# Patient Record
Sex: MALE | Race: WHITE | NOT HISPANIC OR LATINO | URBAN - METROPOLITAN AREA
[De-identification: names, ages, dates, MRNs, and addresses within clinical notes are randomized per-mention and may not be internally consistent; named-entity substitution may affect disease eponyms.]

---

## 2017-06-05 ENCOUNTER — INPATIENT (INPATIENT)
Facility: HOSPITAL | Age: 82
LOS: 3 days | Discharge: OTHER INSTITUTION | DRG: 470 | End: 2017-06-09
Attending: ORTHOPAEDIC SURGERY | Admitting: ORTHOPAEDIC SURGERY
Payer: MEDICARE

## 2017-06-05 VITALS
RESPIRATION RATE: 18 BRPM | OXYGEN SATURATION: 94 % | SYSTOLIC BLOOD PRESSURE: 163 MMHG | HEART RATE: 111 BPM | DIASTOLIC BLOOD PRESSURE: 88 MMHG | TEMPERATURE: 99 F

## 2017-06-05 PROCEDURE — 93010 ELECTROCARDIOGRAM REPORT: CPT

## 2017-06-05 PROCEDURE — 99285 EMERGENCY DEPT VISIT HI MDM: CPT | Mod: 25,GC

## 2017-06-05 NOTE — ED ADULT NURSE NOTE - PMH
BPH (Benign Prostatic Hyperplasia)    Cholelithiasis    Darier's disease    Diverticulitis    HTN (hypertension)    Hyperlipidemia    IBS (irritable bowel syndrome)    MVP (mitral valve prolapse)    PVC's (premature ventricular contractions)    Restless leg syndrome    Seasonal allergies    Type 2 diabetes mellitus

## 2017-06-05 NOTE — ED ADULT NURSE NOTE - PSH
Anal fistula  s/p surgery  Hx of left hemicolectomy    S/P hernia repair  bilateral  S/P tonsillectomy    S/P TURP  x 2

## 2017-06-05 NOTE — ED ADULT NURSE NOTE - OBJECTIVE STATEMENT
81 yr old male coming in with Right hip pain s/p fall today. Patient states he was removing his pants when he tripped and fell backwards; patient has not been able to ambulate since. No internal or external rotation noted however patient has his right foot externally rotated for comfort. +pedal pulses bilaterally. Pt able to wiggle toes bilaterally. +sensation bilaterally. Denies hitting head. No chest pain, sob, n/v/d, fevers, chills, dizziness. No abrasions or lacerations noted. IV 18G right AC. No family at bedside..

## 2017-06-06 ENCOUNTER — RESULT REVIEW (OUTPATIENT)
Age: 82
End: 2017-06-06

## 2017-06-06 DIAGNOSIS — S72.90XA UNSPECIFIED FRACTURE OF UNSPECIFIED FEMUR, INITIAL ENCOUNTER FOR CLOSED FRACTURE: ICD-10-CM

## 2017-06-06 LAB
ALBUMIN SERPL ELPH-MCNC: 4.6 G/DL — SIGNIFICANT CHANGE UP (ref 3.3–5)
ALP SERPL-CCNC: 85 U/L — SIGNIFICANT CHANGE UP (ref 40–120)
ALT FLD-CCNC: 25 U/L RC — SIGNIFICANT CHANGE UP (ref 10–45)
ANION GAP SERPL CALC-SCNC: 13 MMOL/L — SIGNIFICANT CHANGE UP (ref 5–17)
ANION GAP SERPL CALC-SCNC: 15 MMOL/L — SIGNIFICANT CHANGE UP (ref 5–17)
APPEARANCE UR: CLEAR — SIGNIFICANT CHANGE UP
APTT BLD: 34.4 SEC — SIGNIFICANT CHANGE UP (ref 27.5–37.4)
AST SERPL-CCNC: 30 U/L — SIGNIFICANT CHANGE UP (ref 10–40)
BASOPHILS # BLD AUTO: 0 K/UL — SIGNIFICANT CHANGE UP (ref 0–0.2)
BASOPHILS NFR BLD AUTO: 0.1 % — SIGNIFICANT CHANGE UP (ref 0–2)
BILIRUB SERPL-MCNC: 0.6 MG/DL — SIGNIFICANT CHANGE UP (ref 0.2–1.2)
BILIRUB UR-MCNC: NEGATIVE — SIGNIFICANT CHANGE UP
BLD GP AB SCN SERPL QL: NEGATIVE — SIGNIFICANT CHANGE UP
BUN SERPL-MCNC: 36 MG/DL — HIGH (ref 7–23)
BUN SERPL-MCNC: 43 MG/DL — HIGH (ref 7–23)
CALCIUM SERPL-MCNC: 11.4 MG/DL — HIGH (ref 8.4–10.5)
CALCIUM SERPL-MCNC: 9.8 MG/DL — SIGNIFICANT CHANGE UP (ref 8.4–10.5)
CHLORIDE SERPL-SCNC: 104 MMOL/L — SIGNIFICANT CHANGE UP (ref 96–108)
CHLORIDE SERPL-SCNC: 104 MMOL/L — SIGNIFICANT CHANGE UP (ref 96–108)
CO2 SERPL-SCNC: 21 MMOL/L — LOW (ref 22–31)
CO2 SERPL-SCNC: 23 MMOL/L — SIGNIFICANT CHANGE UP (ref 22–31)
COLOR SPEC: YELLOW — SIGNIFICANT CHANGE UP
COMMENT - URINE: SIGNIFICANT CHANGE UP
CREAT SERPL-MCNC: 1.79 MG/DL — HIGH (ref 0.5–1.3)
CREAT SERPL-MCNC: 1.91 MG/DL — HIGH (ref 0.5–1.3)
DIFF PNL FLD: ABNORMAL
EOSINOPHIL # BLD AUTO: 0.2 K/UL — SIGNIFICANT CHANGE UP (ref 0–0.5)
EOSINOPHIL NFR BLD AUTO: 1 % — SIGNIFICANT CHANGE UP (ref 0–6)
GLUCOSE SERPL-MCNC: 132 MG/DL — HIGH (ref 70–99)
GLUCOSE SERPL-MCNC: 98 MG/DL — SIGNIFICANT CHANGE UP (ref 70–99)
GLUCOSE UR QL: NEGATIVE — SIGNIFICANT CHANGE UP
HCT VFR BLD CALC: 51.4 % — HIGH (ref 39–50)
HCT VFR BLD CALC: 56.9 % — HIGH (ref 39–50)
HCT VFR BLD CALC: 57.2 % — CRITICAL HIGH (ref 39–50)
HGB BLD-MCNC: 16.6 G/DL — SIGNIFICANT CHANGE UP (ref 13–17)
HGB BLD-MCNC: 17.6 G/DL — HIGH (ref 13–17)
HGB BLD-MCNC: 18.1 G/DL — HIGH (ref 13–17)
HYALINE CASTS # UR AUTO: ABNORMAL
INR BLD: 1.05 RATIO — SIGNIFICANT CHANGE UP (ref 0.88–1.16)
KETONES UR-MCNC: NEGATIVE — SIGNIFICANT CHANGE UP
LEUKOCYTE ESTERASE UR-ACNC: NEGATIVE — SIGNIFICANT CHANGE UP
LYMPHOCYTES # BLD AUTO: 1 K/UL — SIGNIFICANT CHANGE UP (ref 1–3.3)
LYMPHOCYTES # BLD AUTO: 4.8 % — LOW (ref 13–44)
MCHC RBC-ENTMCNC: 29.6 PG — SIGNIFICANT CHANGE UP (ref 27–34)
MCHC RBC-ENTMCNC: 30.1 PG — SIGNIFICANT CHANGE UP (ref 27–34)
MCHC RBC-ENTMCNC: 30.7 GM/DL — LOW (ref 32–36)
MCHC RBC-ENTMCNC: 30.8 PG — SIGNIFICANT CHANGE UP (ref 27–34)
MCHC RBC-ENTMCNC: 31.9 GM/DL — LOW (ref 32–36)
MCHC RBC-ENTMCNC: 32.3 GM/DL — SIGNIFICANT CHANGE UP (ref 32–36)
MCV RBC AUTO: 94.3 FL — SIGNIFICANT CHANGE UP (ref 80–100)
MCV RBC AUTO: 95.3 FL — SIGNIFICANT CHANGE UP (ref 80–100)
MCV RBC AUTO: 96.3 FL — SIGNIFICANT CHANGE UP (ref 80–100)
MONOCYTES # BLD AUTO: 1.2 K/UL — HIGH (ref 0–0.9)
MONOCYTES NFR BLD AUTO: 5.6 % — SIGNIFICANT CHANGE UP (ref 2–14)
NEUTROPHILS # BLD AUTO: 18.8 K/UL — HIGH (ref 1.8–7.4)
NEUTROPHILS NFR BLD AUTO: 88.4 % — HIGH (ref 43–77)
NITRITE UR-MCNC: NEGATIVE — SIGNIFICANT CHANGE UP
PH UR: 6 — SIGNIFICANT CHANGE UP (ref 5–8)
PLATELET # BLD AUTO: 299 K/UL — SIGNIFICANT CHANGE UP (ref 150–400)
PLATELET # BLD AUTO: 327 K/UL — SIGNIFICANT CHANGE UP (ref 150–400)
PLATELET # BLD AUTO: 361 K/UL — SIGNIFICANT CHANGE UP (ref 150–400)
POTASSIUM SERPL-MCNC: 4.7 MMOL/L — SIGNIFICANT CHANGE UP (ref 3.5–5.3)
POTASSIUM SERPL-MCNC: 5.1 MMOL/L — SIGNIFICANT CHANGE UP (ref 3.5–5.3)
POTASSIUM SERPL-SCNC: 4.7 MMOL/L — SIGNIFICANT CHANGE UP (ref 3.5–5.3)
POTASSIUM SERPL-SCNC: 5.1 MMOL/L — SIGNIFICANT CHANGE UP (ref 3.5–5.3)
PROT SERPL-MCNC: 7 G/DL — SIGNIFICANT CHANGE UP (ref 6–8.3)
PROT UR-MCNC: 30 MG/DL
PROTHROM AB SERPL-ACNC: 11.4 SEC — SIGNIFICANT CHANGE UP (ref 9.8–12.7)
RBC # BLD: 5.39 M/UL — SIGNIFICANT CHANGE UP (ref 4.2–5.8)
RBC # BLD: 5.94 M/UL — HIGH (ref 4.2–5.8)
RBC # BLD: 6.03 M/UL — HIGH (ref 4.2–5.8)
RBC # FLD: 13.9 % — SIGNIFICANT CHANGE UP (ref 10.3–14.5)
RBC # FLD: 14 % — SIGNIFICANT CHANGE UP (ref 10.3–14.5)
RBC # FLD: 14 % — SIGNIFICANT CHANGE UP (ref 10.3–14.5)
RBC CASTS # UR COMP ASSIST: ABNORMAL /HPF (ref 0–2)
RH IG SCN BLD-IMP: NEGATIVE — SIGNIFICANT CHANGE UP
SODIUM SERPL-SCNC: 140 MMOL/L — SIGNIFICANT CHANGE UP (ref 135–145)
SODIUM SERPL-SCNC: 140 MMOL/L — SIGNIFICANT CHANGE UP (ref 135–145)
SP GR SPEC: 1.02 — SIGNIFICANT CHANGE UP (ref 1.01–1.02)
UROBILINOGEN FLD QL: NEGATIVE — SIGNIFICANT CHANGE UP
WBC # BLD: 15.8 K/UL — HIGH (ref 3.8–10.5)
WBC # BLD: 18.9 K/UL — HIGH (ref 3.8–10.5)
WBC # BLD: 21.2 K/UL — HIGH (ref 3.8–10.5)
WBC # FLD AUTO: 15.8 K/UL — HIGH (ref 3.8–10.5)
WBC # FLD AUTO: 18.9 K/UL — HIGH (ref 3.8–10.5)
WBC # FLD AUTO: 21.2 K/UL — HIGH (ref 3.8–10.5)
WBC UR QL: SIGNIFICANT CHANGE UP /HPF (ref 0–5)

## 2017-06-06 PROCEDURE — 73502 X-RAY EXAM HIP UNI 2-3 VIEWS: CPT | Mod: 26,RT

## 2017-06-06 PROCEDURE — 73552 X-RAY EXAM OF FEMUR 2/>: CPT | Mod: 26,RT

## 2017-06-06 PROCEDURE — 72170 X-RAY EXAM OF PELVIS: CPT | Mod: 26,77

## 2017-06-06 PROCEDURE — 88311 DECALCIFY TISSUE: CPT | Mod: 26

## 2017-06-06 PROCEDURE — 27130 TOTAL HIP ARTHROPLASTY: CPT | Mod: RT

## 2017-06-06 PROCEDURE — 71010: CPT | Mod: 26

## 2017-06-06 PROCEDURE — 88305 TISSUE EXAM BY PATHOLOGIST: CPT | Mod: 26

## 2017-06-06 RX ORDER — GLUCAGON INJECTION, SOLUTION 0.5 MG/.1ML
1 INJECTION, SOLUTION SUBCUTANEOUS ONCE
Qty: 0 | Refills: 0 | Status: DISCONTINUED | OUTPATIENT
Start: 2017-06-06 | End: 2017-06-09

## 2017-06-06 RX ORDER — ONDANSETRON 8 MG/1
4 TABLET, FILM COATED ORAL ONCE
Qty: 0 | Refills: 0 | Status: DISCONTINUED | OUTPATIENT
Start: 2017-06-06 | End: 2017-06-06

## 2017-06-06 RX ORDER — MAGNESIUM HYDROXIDE 400 MG/1
30 TABLET, CHEWABLE ORAL DAILY
Qty: 0 | Refills: 0 | Status: DISCONTINUED | OUTPATIENT
Start: 2017-06-06 | End: 2017-06-09

## 2017-06-06 RX ORDER — INSULIN LISPRO 100/ML
VIAL (ML) SUBCUTANEOUS AT BEDTIME
Qty: 0 | Refills: 0 | Status: DISCONTINUED | OUTPATIENT
Start: 2017-06-06 | End: 2017-06-06

## 2017-06-06 RX ORDER — SODIUM CHLORIDE 9 MG/ML
500 INJECTION, SOLUTION INTRAVENOUS ONCE
Qty: 0 | Refills: 0 | Status: COMPLETED | OUTPATIENT
Start: 2017-06-06 | End: 2017-06-06

## 2017-06-06 RX ORDER — ACETAMINOPHEN 500 MG
650 TABLET ORAL EVERY 6 HOURS
Qty: 0 | Refills: 0 | Status: DISCONTINUED | OUTPATIENT
Start: 2017-06-06 | End: 2017-06-09

## 2017-06-06 RX ORDER — DEXTROSE 50 % IN WATER 50 %
25 SYRINGE (ML) INTRAVENOUS ONCE
Qty: 0 | Refills: 0 | Status: DISCONTINUED | OUTPATIENT
Start: 2017-06-06 | End: 2017-06-06

## 2017-06-06 RX ORDER — FINASTERIDE 5 MG/1
5 TABLET, FILM COATED ORAL DAILY
Qty: 0 | Refills: 0 | Status: DISCONTINUED | OUTPATIENT
Start: 2017-06-06 | End: 2017-06-06

## 2017-06-06 RX ORDER — ACETAMINOPHEN 500 MG
1000 TABLET ORAL ONCE
Qty: 0 | Refills: 0 | Status: COMPLETED | OUTPATIENT
Start: 2017-06-06 | End: 2017-06-06

## 2017-06-06 RX ORDER — CEFAZOLIN SODIUM 1 G
2000 VIAL (EA) INJECTION EVERY 8 HOURS
Qty: 0 | Refills: 0 | Status: COMPLETED | OUTPATIENT
Start: 2017-06-06 | End: 2017-06-07

## 2017-06-06 RX ORDER — TAMSULOSIN HYDROCHLORIDE 0.4 MG/1
0.4 CAPSULE ORAL AT BEDTIME
Qty: 0 | Refills: 0 | Status: DISCONTINUED | OUTPATIENT
Start: 2017-06-06 | End: 2017-06-06

## 2017-06-06 RX ORDER — OXYCODONE HYDROCHLORIDE 5 MG/1
5 TABLET ORAL EVERY 4 HOURS
Qty: 0 | Refills: 0 | Status: DISCONTINUED | OUTPATIENT
Start: 2017-06-06 | End: 2017-06-09

## 2017-06-06 RX ORDER — CHOLECALCIFEROL (VITAMIN D3) 125 MCG
1000 CAPSULE ORAL ONCE
Qty: 0 | Refills: 0 | Status: COMPLETED | OUTPATIENT
Start: 2017-06-06 | End: 2017-06-06

## 2017-06-06 RX ORDER — INSULIN LISPRO 100/ML
VIAL (ML) SUBCUTANEOUS
Qty: 0 | Refills: 0 | Status: DISCONTINUED | OUTPATIENT
Start: 2017-06-06 | End: 2017-06-09

## 2017-06-06 RX ORDER — OXYCODONE HYDROCHLORIDE 5 MG/1
10 TABLET ORAL EVERY 4 HOURS
Qty: 0 | Refills: 0 | Status: DISCONTINUED | OUTPATIENT
Start: 2017-06-06 | End: 2017-06-09

## 2017-06-06 RX ORDER — HYDROMORPHONE HYDROCHLORIDE 2 MG/ML
0.5 INJECTION INTRAMUSCULAR; INTRAVENOUS; SUBCUTANEOUS
Qty: 0 | Refills: 0 | Status: DISCONTINUED | OUTPATIENT
Start: 2017-06-06 | End: 2017-06-06

## 2017-06-06 RX ORDER — SODIUM CHLORIDE 9 MG/ML
1000 INJECTION, SOLUTION INTRAVENOUS
Qty: 0 | Refills: 0 | Status: DISCONTINUED | OUTPATIENT
Start: 2017-06-06 | End: 2017-06-06

## 2017-06-06 RX ORDER — SODIUM CHLORIDE 9 MG/ML
1000 INJECTION, SOLUTION INTRAVENOUS
Qty: 0 | Refills: 0 | Status: DISCONTINUED | OUTPATIENT
Start: 2017-06-06 | End: 2017-06-09

## 2017-06-06 RX ORDER — ONDANSETRON 8 MG/1
4 TABLET, FILM COATED ORAL EVERY 4 HOURS
Qty: 0 | Refills: 0 | Status: DISCONTINUED | OUTPATIENT
Start: 2017-06-06 | End: 2017-06-09

## 2017-06-06 RX ORDER — DEXTROSE 50 % IN WATER 50 %
12.5 SYRINGE (ML) INTRAVENOUS ONCE
Qty: 0 | Refills: 0 | Status: DISCONTINUED | OUTPATIENT
Start: 2017-06-06 | End: 2017-06-09

## 2017-06-06 RX ORDER — ONDANSETRON 8 MG/1
4 TABLET, FILM COATED ORAL EVERY 6 HOURS
Qty: 0 | Refills: 0 | Status: DISCONTINUED | OUTPATIENT
Start: 2017-06-06 | End: 2017-06-09

## 2017-06-06 RX ORDER — ENOXAPARIN SODIUM 100 MG/ML
40 INJECTION SUBCUTANEOUS DAILY
Qty: 0 | Refills: 0 | Status: DISCONTINUED | OUTPATIENT
Start: 2017-06-07 | End: 2017-06-09

## 2017-06-06 RX ORDER — INSULIN LISPRO 100/ML
VIAL (ML) SUBCUTANEOUS AT BEDTIME
Qty: 0 | Refills: 0 | Status: DISCONTINUED | OUTPATIENT
Start: 2017-06-06 | End: 2017-06-09

## 2017-06-06 RX ORDER — POLYETHYLENE GLYCOL 3350 17 G/17G
17 POWDER, FOR SOLUTION ORAL DAILY
Qty: 0 | Refills: 0 | Status: DISCONTINUED | OUTPATIENT
Start: 2017-06-06 | End: 2017-06-09

## 2017-06-06 RX ORDER — FINASTERIDE 5 MG/1
5 TABLET, FILM COATED ORAL DAILY
Qty: 0 | Refills: 0 | Status: DISCONTINUED | OUTPATIENT
Start: 2017-06-06 | End: 2017-06-09

## 2017-06-06 RX ORDER — ACETAMINOPHEN 500 MG
1000 TABLET ORAL ONCE
Qty: 0 | Refills: 0 | Status: DISCONTINUED | OUTPATIENT
Start: 2017-06-06 | End: 2017-06-09

## 2017-06-06 RX ORDER — HYDROMORPHONE HYDROCHLORIDE 2 MG/ML
0.5 INJECTION INTRAMUSCULAR; INTRAVENOUS; SUBCUTANEOUS EVERY 4 HOURS
Qty: 0 | Refills: 0 | Status: DISCONTINUED | OUTPATIENT
Start: 2017-06-06 | End: 2017-06-09

## 2017-06-06 RX ORDER — MORPHINE SULFATE 50 MG/1
4 CAPSULE, EXTENDED RELEASE ORAL EVERY 4 HOURS
Qty: 0 | Refills: 0 | Status: DISCONTINUED | OUTPATIENT
Start: 2017-06-06 | End: 2017-06-06

## 2017-06-06 RX ORDER — MONTELUKAST 4 MG/1
10 TABLET, CHEWABLE ORAL ONCE
Qty: 0 | Refills: 0 | Status: COMPLETED | OUTPATIENT
Start: 2017-06-06 | End: 2017-06-06

## 2017-06-06 RX ORDER — DEXTROSE 50 % IN WATER 50 %
1 SYRINGE (ML) INTRAVENOUS ONCE
Qty: 0 | Refills: 0 | Status: DISCONTINUED | OUTPATIENT
Start: 2017-06-06 | End: 2017-06-09

## 2017-06-06 RX ORDER — ALLOPURINOL 300 MG
300 TABLET ORAL ONCE
Qty: 0 | Refills: 0 | Status: COMPLETED | OUTPATIENT
Start: 2017-06-06 | End: 2017-06-06

## 2017-06-06 RX ORDER — SODIUM CHLORIDE 9 MG/ML
1000 INJECTION, SOLUTION INTRAVENOUS
Qty: 0 | Refills: 0 | Status: DISCONTINUED | OUTPATIENT
Start: 2017-06-06 | End: 2017-06-07

## 2017-06-06 RX ORDER — GLUCAGON INJECTION, SOLUTION 0.5 MG/.1ML
1 INJECTION, SOLUTION SUBCUTANEOUS ONCE
Qty: 0 | Refills: 0 | Status: DISCONTINUED | OUTPATIENT
Start: 2017-06-06 | End: 2017-06-06

## 2017-06-06 RX ORDER — INSULIN LISPRO 100/ML
VIAL (ML) SUBCUTANEOUS
Qty: 0 | Refills: 0 | Status: DISCONTINUED | OUTPATIENT
Start: 2017-06-06 | End: 2017-06-06

## 2017-06-06 RX ORDER — OXYCODONE HYDROCHLORIDE 5 MG/1
5 TABLET ORAL EVERY 4 HOURS
Qty: 0 | Refills: 0 | Status: DISCONTINUED | OUTPATIENT
Start: 2017-06-06 | End: 2017-06-06

## 2017-06-06 RX ORDER — TAMSULOSIN HYDROCHLORIDE 0.4 MG/1
0.4 CAPSULE ORAL AT BEDTIME
Qty: 0 | Refills: 0 | Status: DISCONTINUED | OUTPATIENT
Start: 2017-06-06 | End: 2017-06-09

## 2017-06-06 RX ORDER — DEXTROSE 50 % IN WATER 50 %
12.5 SYRINGE (ML) INTRAVENOUS ONCE
Qty: 0 | Refills: 0 | Status: DISCONTINUED | OUTPATIENT
Start: 2017-06-06 | End: 2017-06-06

## 2017-06-06 RX ORDER — DEXTROSE 50 % IN WATER 50 %
25 SYRINGE (ML) INTRAVENOUS ONCE
Qty: 0 | Refills: 0 | Status: DISCONTINUED | OUTPATIENT
Start: 2017-06-06 | End: 2017-06-09

## 2017-06-06 RX ORDER — DOCUSATE SODIUM 100 MG
100 CAPSULE ORAL THREE TIMES A DAY
Qty: 0 | Refills: 0 | Status: DISCONTINUED | OUTPATIENT
Start: 2017-06-06 | End: 2017-06-09

## 2017-06-06 RX ORDER — OXYCODONE HYDROCHLORIDE 5 MG/1
10 TABLET ORAL EVERY 4 HOURS
Qty: 0 | Refills: 0 | Status: DISCONTINUED | OUTPATIENT
Start: 2017-06-06 | End: 2017-06-06

## 2017-06-06 RX ORDER — SENNA PLUS 8.6 MG/1
2 TABLET ORAL AT BEDTIME
Qty: 0 | Refills: 0 | Status: DISCONTINUED | OUTPATIENT
Start: 2017-06-06 | End: 2017-06-09

## 2017-06-06 RX ORDER — DEXTROSE 50 % IN WATER 50 %
1 SYRINGE (ML) INTRAVENOUS ONCE
Qty: 0 | Refills: 0 | Status: DISCONTINUED | OUTPATIENT
Start: 2017-06-06 | End: 2017-06-06

## 2017-06-06 RX ORDER — MORPHINE SULFATE 50 MG/1
4 CAPSULE, EXTENDED RELEASE ORAL ONCE
Qty: 0 | Refills: 0 | Status: DISCONTINUED | OUTPATIENT
Start: 2017-06-06 | End: 2017-06-06

## 2017-06-06 RX ORDER — ONDANSETRON 8 MG/1
4 TABLET, FILM COATED ORAL EVERY 4 HOURS
Qty: 0 | Refills: 0 | Status: DISCONTINUED | OUTPATIENT
Start: 2017-06-06 | End: 2017-06-06

## 2017-06-06 RX ADMIN — Medication 300 MILLIGRAM(S): at 05:40

## 2017-06-06 RX ADMIN — FINASTERIDE 5 MILLIGRAM(S): 5 TABLET, FILM COATED ORAL at 05:40

## 2017-06-06 RX ADMIN — Medication 400 MILLIGRAM(S): at 01:41

## 2017-06-06 RX ADMIN — FINASTERIDE 5 MILLIGRAM(S): 5 TABLET, FILM COATED ORAL at 19:13

## 2017-06-06 RX ADMIN — Medication 1000 UNIT(S): at 05:40

## 2017-06-06 RX ADMIN — SODIUM CHLORIDE 50 MILLILITER(S): 9 INJECTION, SOLUTION INTRAVENOUS at 04:59

## 2017-06-06 RX ADMIN — SODIUM CHLORIDE 1000 MILLILITER(S): 9 INJECTION, SOLUTION INTRAVENOUS at 19:00

## 2017-06-06 RX ADMIN — TAMSULOSIN HYDROCHLORIDE 0.4 MILLIGRAM(S): 0.4 CAPSULE ORAL at 19:13

## 2017-06-06 RX ADMIN — MONTELUKAST 10 MILLIGRAM(S): 4 TABLET, CHEWABLE ORAL at 05:39

## 2017-06-06 RX ADMIN — Medication 400 MILLIGRAM(S): at 19:00

## 2017-06-06 RX ADMIN — Medication 1000 MILLIGRAM(S): at 19:54

## 2017-06-06 RX ADMIN — MORPHINE SULFATE 4 MILLIGRAM(S): 50 CAPSULE, EXTENDED RELEASE ORAL at 07:14

## 2017-06-06 RX ADMIN — Medication 100 MILLIGRAM(S): at 23:17

## 2017-06-06 RX ADMIN — SODIUM CHLORIDE 50 MILLILITER(S): 9 INJECTION, SOLUTION INTRAVENOUS at 06:59

## 2017-06-06 RX ADMIN — SODIUM CHLORIDE 100 MILLILITER(S): 9 INJECTION, SOLUTION INTRAVENOUS at 18:34

## 2017-06-06 RX ADMIN — MORPHINE SULFATE 4 MILLIGRAM(S): 50 CAPSULE, EXTENDED RELEASE ORAL at 05:43

## 2017-06-06 RX ADMIN — TAMSULOSIN HYDROCHLORIDE 0.4 MILLIGRAM(S): 0.4 CAPSULE ORAL at 05:40

## 2017-06-06 RX ADMIN — MORPHINE SULFATE 4 MILLIGRAM(S): 50 CAPSULE, EXTENDED RELEASE ORAL at 04:16

## 2017-06-06 RX ADMIN — Medication 1000 MILLIGRAM(S): at 04:02

## 2017-06-06 RX ADMIN — MORPHINE SULFATE 4 MILLIGRAM(S): 50 CAPSULE, EXTENDED RELEASE ORAL at 06:59

## 2017-06-06 NOTE — ED PROVIDER NOTE - ATTENDING CONTRIBUTION TO CARE
81 year old M presenting with R hip pain s/p fall onto buttock this evening. Pt was removing jeans and fell back. no head injury, no loss of consciousness. only c/o R hip pain. not able to walk.   pain is severe, worse with movement. took nothing for pain  plan to eval for hip fxr, analgesia. admission for fxr vs likely admission if patient not walking.

## 2017-06-06 NOTE — ED PROVIDER NOTE - PHYSICAL EXAMINATION
Well Appearing, Nontoxic, NAD;  Symm Facies, PERRL 3mm, (-)Pallor, Anicteric, MMM;  No JVD/Bruits or stridor;  RRR w/o m/g/r;   CTAB w/o w/r/r;   Abd soft, nt/nd, +bs;  No CVAT;  No edema/calf tender;  No rash;  AOX3, Normal speech, normal strength/sensation  R hip pain with range of motion, good dp/pt pulses. neurovascularly intact.

## 2017-06-06 NOTE — PATIENT PROFILE ADULT. - NS TRANSFER PATIENT BELONGINGS
wedding band, necklace, pants, shirt, wallet, shoes, keys, cellphone/Cell Phone/PDA (specify)/Clothing/Jewelry/Money (specify)/Wrist Watch/Electronic Device (specify)

## 2017-06-06 NOTE — H&P ADULT. - HISTORY OF PRESENT ILLNESS
80yo M presents with right hip pain s/p fall. Pt is a community ambulatory at baseline without assistive devices. Pt denies numbness tingling paresthesias in affected limb. Pt denies headstrike or LOC and denies any other orthopedic injuries at this time    PMHx: BPH, HTN, HLD, IBS, MVP, DM, RLS  PSHx: TURP x2  Allergies: sulfa, ambien, compazine  Social: denies tobacco/EtOH  Imaging: XR demonstrate a displaced right femoral neck fracture    PE:  Gen: NAD, AAOx3  RLE: Skin intact, no gross deformity, limb is shortened and ER; no bony TTP at Knee/Foot/Toes, unable to SLR, +logroll/heelstrike, +EHL/FHL/TA/GS, SILT L3-S1, +DP/PT Pulses, compartments soft, no calf TTP B/L    Secondary Survey: Full ROM of unaffected extremities, SILT globally, compartments soft, no bony TTP over bony prominences, no calf TTP, able to SLR with contralateral leg, no TTP along axial spine

## 2017-06-07 LAB
ANION GAP SERPL CALC-SCNC: 15 MMOL/L — SIGNIFICANT CHANGE UP (ref 5–17)
BUN SERPL-MCNC: 33 MG/DL — HIGH (ref 7–23)
CALCIUM SERPL-MCNC: 9.6 MG/DL — SIGNIFICANT CHANGE UP (ref 8.4–10.5)
CHLORIDE SERPL-SCNC: 102 MMOL/L — SIGNIFICANT CHANGE UP (ref 96–108)
CO2 SERPL-SCNC: 19 MMOL/L — LOW (ref 22–31)
CREAT SERPL-MCNC: 1.68 MG/DL — HIGH (ref 0.5–1.3)
GLUCOSE SERPL-MCNC: 123 MG/DL — HIGH (ref 70–99)
HBA1C BLD-MCNC: 5.6 % — SIGNIFICANT CHANGE UP (ref 4–5.6)
HCT VFR BLD CALC: 48.4 % — SIGNIFICANT CHANGE UP (ref 39–50)
HGB BLD-MCNC: 16.1 G/DL — SIGNIFICANT CHANGE UP (ref 13–17)
MCHC RBC-ENTMCNC: 30.2 PG — SIGNIFICANT CHANGE UP (ref 27–34)
MCHC RBC-ENTMCNC: 33.3 GM/DL — SIGNIFICANT CHANGE UP (ref 32–36)
MCV RBC AUTO: 90.8 FL — SIGNIFICANT CHANGE UP (ref 80–100)
PLATELET # BLD AUTO: 274 K/UL — SIGNIFICANT CHANGE UP (ref 150–400)
POTASSIUM SERPL-MCNC: 5.2 MMOL/L — SIGNIFICANT CHANGE UP (ref 3.5–5.3)
POTASSIUM SERPL-SCNC: 5.2 MMOL/L — SIGNIFICANT CHANGE UP (ref 3.5–5.3)
RBC # BLD: 5.33 M/UL — SIGNIFICANT CHANGE UP (ref 4.2–5.8)
RBC # FLD: 15.3 % — HIGH (ref 10.3–14.5)
SODIUM SERPL-SCNC: 136 MMOL/L — SIGNIFICANT CHANGE UP (ref 135–145)
WBC # BLD: 18.86 K/UL — HIGH (ref 3.8–10.5)
WBC # FLD AUTO: 18.86 K/UL — HIGH (ref 3.8–10.5)

## 2017-06-07 RX ORDER — BENZOCAINE AND MENTHOL 5; 1 G/100ML; G/100ML
1 LIQUID ORAL
Qty: 0 | Refills: 0 | Status: DISCONTINUED | OUTPATIENT
Start: 2017-06-07 | End: 2017-06-09

## 2017-06-07 RX ADMIN — Medication 650 MILLIGRAM(S): at 06:53

## 2017-06-07 RX ADMIN — POLYETHYLENE GLYCOL 3350 17 GRAM(S): 17 POWDER, FOR SOLUTION ORAL at 11:48

## 2017-06-07 RX ADMIN — ENOXAPARIN SODIUM 40 MILLIGRAM(S): 100 INJECTION SUBCUTANEOUS at 13:01

## 2017-06-07 RX ADMIN — TAMSULOSIN HYDROCHLORIDE 0.4 MILLIGRAM(S): 0.4 CAPSULE ORAL at 22:21

## 2017-06-07 RX ADMIN — Medication 650 MILLIGRAM(S): at 13:33

## 2017-06-07 RX ADMIN — FINASTERIDE 5 MILLIGRAM(S): 5 TABLET, FILM COATED ORAL at 11:48

## 2017-06-07 RX ADMIN — OXYCODONE HYDROCHLORIDE 5 MILLIGRAM(S): 5 TABLET ORAL at 15:38

## 2017-06-07 RX ADMIN — OXYCODONE HYDROCHLORIDE 5 MILLIGRAM(S): 5 TABLET ORAL at 15:07

## 2017-06-07 RX ADMIN — Medication 650 MILLIGRAM(S): at 13:03

## 2017-06-07 RX ADMIN — Medication 100 MILLIGRAM(S): at 06:03

## 2017-06-07 RX ADMIN — Medication 100 MILLIGRAM(S): at 06:52

## 2017-06-07 RX ADMIN — Medication 100 MILLIGRAM(S): at 22:21

## 2017-06-07 RX ADMIN — Medication 100 MILLIGRAM(S): at 13:01

## 2017-06-07 NOTE — DISCHARGE NOTE ADULT - MEDICATION SUMMARY - MEDICATIONS TO STOP TAKING
I will STOP taking the medications listed below when I get home from the hospital:    Co-Q10 100 mg oral capsule  -- 1 cap(s) by mouth once a day    acetaminophen-oxyCODONE 325 mg-5 mg oral tablet  -- 1 tab(s) by mouth every 4 hours, As needed, Moderate Pain    acetaminophen-oxyCODONE 325 mg-5 mg oral tablet  -- 2 tab(s) by mouth every 6 hours, As Needed -Severe Pain

## 2017-06-07 NOTE — DISCHARGE NOTE ADULT - CARE PROVIDER_API CALL
Wiliam Sheffield), Orthopedics  [address] [place] [address]  Phone: [phone]  Fax: [phone]

## 2017-06-07 NOTE — OCCUPATIONAL THERAPY INITIAL EVALUATION ADULT - PERTINENT HX OF CURRENT PROBLEM, REHAB EVAL
82yo M presents with right hip pain s/p fall. Pt is a community ambulatory at baseline without assistive devices. Pt denies numbness tingling paresthesias in affected limb.. Pt with Right fem neck fracture. s/p VERONA 6/6/17

## 2017-06-07 NOTE — DISCHARGE NOTE ADULT - ADDITIONAL INSTRUCTIONS
- Follow up with Dr. Sheffield in 3-4 weeks after surgery; call office for appointment upon discharge from rehab  - - Please have staples/sutures removed by physician 10-14 days after surgery if applicable  - - please follow up with your primary care doctor after discharge from hospital to discuss your hospital stay and any changes to you medications

## 2017-06-07 NOTE — PROGRESS NOTE ADULT - SUBJECTIVE AND OBJECTIVE BOX
Patient is a 81y old  Male who presents with a chief complaint of right femoral neck fracture s/p right VERONA on 17    INTERVAL HPI/OVERNIGHT EVENTS:    Patient feels well no fever chills or calix pnd orthopnea. Pain is3/10. Denies pleuritic pain nO abadominal pain nausea or vomiting.    PAST MEDICAL & SURGICAL HISTORY:  Restless leg syndrome  Seasonal allergies  BPH (Benign Prostatic Hyperplasia)  Cholelithiasis  Type 2 diabetes mellitus  Diverticulitis  IBS (irritable bowel syndrome)  Darier&#x27;s disease  MVP (mitral valve prolapse)  PVC&#x27;s (premature ventricular contractions)  Hyperlipidemia  HTN (hypertension)  Hx of left hemicolectomy  Anal fistula: s/p surgery  S/P hernia repair: bilateral  S/P TURP: x 2  S/P tonsillectomy      MEDICATIONS  (STANDING):  tamsulosin 0.4milliGRAM(s) Oral at bedtime  finasteride 5milliGRAM(s) Oral daily  lactated ringers. 1000milliLiter(s) IV Continuous <Continuous>  polyethylene glycol 3350 17Gram(s) Oral daily  docusate sodium 100milliGRAM(s) Oral three times a day  insulin lispro (HumaLOG) corrective regimen sliding scale  SubCutaneous three times a day before meals  insulin lispro (HumaLOG) corrective regimen sliding scale  SubCutaneous at bedtime  dextrose 5%. 1000milliLiter(s) IV Continuous <Continuous>  dextrose 50% Injectable 12.5Gram(s) IV Push once  dextrose 50% Injectable 25Gram(s) IV Push once  dextrose 50% Injectable 25Gram(s) IV Push once  enoxaparin Injectable 40milliGRAM(s) SubCutaneous daily    MEDICATIONS  (PRN):  oxyCODONE IR 10milliGRAM(s) Oral every 4 hours PRN Moderate Pain  oxyCODONE IR 5milliGRAM(s) Oral every 4 hours PRN Mild Pain  ondansetron Injectable 4milliGRAM(s) IV Push every 4 hours PRN Nausea and/or Vomiting  acetaminophen   Tablet 650milliGRAM(s) Oral every 6 hours PRN For Temp greater than 38 C (100.4 F)  acetaminophen   Tablet. 650milliGRAM(s) Oral every 6 hours PRN Mild Pain (1 - 3)  aluminum hydroxide/magnesium hydroxide/simethicone Suspension 30milliLiter(s) Oral four times a day PRN Indigestion  ondansetron Injectable 4milliGRAM(s) IV Push every 6 hours PRN Nausea and/or Vomiting  senna 2Tablet(s) Oral at bedtime PRN Constipation  magnesium hydroxide Suspension 30milliLiter(s) Oral daily PRN Constipation  dextrose Gel 1Dose(s) Oral once PRN Blood Glucose LESS THAN 70 milliGRAM(s)/deciliter  glucagon  Injectable 1milliGRAM(s) IntraMuscular once PRN Glucose LESS THAN 70 milligrams/deciliter  HYDROmorphone  Injectable 0.5milliGRAM(s) IV Push every 4 hours PRN severe breakthrough pain  acetaminophen  IVPB. 1000milliGRAM(s) IV Intermittent once PRN Mild Pain (1 - 3)      REVIEW OF SYSTEMS:  CONSTITUTIONAL: No fever or chills  NECK: No pain or stiffness  RESPIRATORY: No cough, wheezing, chills or hemoptysis; No shortness of breath  CARDIOVASCULAR: No chest pain, palpitations, dizziness, or leg swelling  GASTROINTESTINAL: No abdominal or epigastric pain. No nausea, vomiting, or hematemesis; No diarrhea or constipation. No melena or hematochezi  NEUROLOGICAL: No headaches, memory loss, loss of strength, numbness, or tremors  SKIN: No itching, burning, rashes, or lesions   MUSCULOSKELETAL: pain at surgical site 3/10    Vital Signs Last 24 Hrs  T(C): 36.8, Max: 37.5 (- @ 13:20)  T(F): 98.2, Max: 99.5 ( @ 13:20)  HR: 71 (71 - 97)  BP: 119/68 (96/58 - 144/66)  BP(mean): 95 (71 - 95)  RR: 18 (15 - 18)  SpO2: 98% (93% - 100%)    I&O's Summary  I & Os for 24h ending 2017 07:00  =============================================  IN: 900 ml / OUT: 1655 ml / NET: -755 ml    I & Os for current day (as of 2017 12:36)  =============================================  IN: 420 ml / OUT: 0 ml / NET: 420 ml      PHYSICAL EXAM:  GENERAL: NAD, well-groomed, well-developed  HEAD:  Atraumatic, Normocephalic  EYES: EOMI, PERRLA, conjunctiva and sclera clear  NECK: Supple, No JVD, Normal thyroid  CHEST/LUNG: Clear to percussion bilaterally; No rales, rhonchi, wheezing, or rubs  HEART: Regular rate and rhythm; No murmurs, rubs, or gallops normal S1S2   ABDOMEN: Soft, Nontender, Nondistended; Bowel sounds present, no organomegaly  EXTREMITIES:  2+ Peripheral Pulses, No clubbing, cyanosis, or 1-2+ edema SURGICAL LEG  NERVOUS SYSTEM:  Alert & Oriented X3, Good concentration; Motor Strength 5/5 B/L upper and lower extremities; DTRs 2+ intact and symmetric    Consultant(s) Notes Reviewed:  [x ] YES  [ ] NO  Care Discussed with Consultants/Other Providers [ x] YES  [ ] NO    LABS:                        16.1   18.86 )-----------( 274      ( 2017 09:27 )             48.4     06-07    136  |  102  |  33<H>  ----------------------------<  123<H>  5.2   |  19<L>  |  1.68<H>    Ca    9.6      2017 09:12    TPro  7.0  /  Alb  4.6  /  TBili  0.6  /  DBili  x   /  AST  30  /  ALT  25  /  AlkPhos  85  06-06    PT/INR - ( 2017 01:41 )   PT: 11.4 sec;   INR: 1.05 ratio         PTT - ( 2017 01:41 )  PTT:34.4 sec  Urinalysis Basic - ( 2017 05:04 )    Color: Yellow / Appearance: Clear / S.020 / pH: x  Gluc: x / Ketone: Negative  / Bili: Negative / Urobili: Negative   Blood: x / Protein: 30 mg/dL / Nitrite: Negative   Leuk Esterase: Negative / RBC: 2-5 /HPF / WBC 0-2 /HPF   Sq Epi: x / Non Sq Epi: x / Bacteria: x      CAPILLARY BLOOD GLUCOSE  108 (2017 12:29)  118 (2017 07:41)  113 (2017 23:47)

## 2017-06-07 NOTE — DISCHARGE NOTE ADULT - OTHER SIGNIFICANT FINDINGS
XR Right femur:  IMPRESSION: Acute transversely oriented right femoral neck fracture with   superior displacement of the distal fracture fragment.

## 2017-06-07 NOTE — DISCHARGE NOTE ADULT - NS AS ACTIVITY OBS
Stairs allowed/No Heavy lifting/straining/Walking-Indoors allowed/Walking-Outdoors allowed Stairs allowed/No Heavy lifting/straining/weight bearing as tolerated right leg; posterior hip precautions/Walking-Outdoors allowed/Walking-Indoors allowed

## 2017-06-07 NOTE — DISCHARGE NOTE ADULT - MEDICATION SUMMARY - MEDICATIONS TO TAKE
I will START or STAY ON the medications listed below when I get home from the hospital:    finasteride 5 mg oral tablet  -- 1 tab(s) by mouth once a day  -- Indication: For BPH    acetaminophen 325 mg oral tablet  -- 2 tab(s) by mouth every 6 hours, As needed, For Temp greater than 38 C (100.4 F)  -- Indication: For fever    acetaminophen 325 mg oral tablet  -- 2 tab(s) by mouth every 6 hours, As needed, Mild Pain (1 - 3)  -- Indication: For pain    oxyCODONE 10 mg oral tablet  -- 1 tab(s) by mouth every 4 hours, As needed, Moderate Pain  -- Indication: For pain    oxyCODONE 5 mg oral tablet  -- 1 tab(s) by mouth every 4 hours, As needed, Mild Pain  -- Indication: For pain    traMADol 50 mg oral tablet  -- 1 tab(s) by mouth every 4 hours, As needed, Moderate Pain (4 - 6)  -- Indication: For pain    tamsulosin 0.4 mg oral capsule  -- 1 cap(s) by mouth once a day  -- Indication: For BPH    acebutolol 200 mg oral capsule  -- 1 cap(s) by mouth 2 times a day  -- Indication: For antiarrythmia    enoxaparin  -- 40 milligram(s) subcutaneous once a day x 6 weeks then resume home aspirin regimen  -- Indication: For dvt ppx    Actos 15 mg oral tablet  -- 1 tab(s) by mouth once a day  -- Indication: For diabetes type 2    allopurinol 300 mg oral tablet  -- 1 tab(s) by mouth once a day  -- Indication: For gout    Zetia 10 mg oral tablet  -- 1 tab(s) by mouth once a day  -- Indication: For hyperlipidemia    Mirapex 0.25 mg oral tablet  -- 1 tab(s) by mouth once a day (at bedtime)  -- Indication: For RLS    Rozerem 8 mg oral tablet  -- 1 tab(s) by mouth once a day (at bedtime)  -- Indication: For RLS    Librax 5 mg-2.5 mg oral capsule  -- 1 cap(s) by mouth once a day (at bedtime)  -- Indication: For IBS    Metamucil  -- 1 cap(s) by mouth once a day  -- Indication: For BM    senna oral tablet  -- 2 tab(s) by mouth once a day (at bedtime), As needed, Constipation  -- Indication: For BM    docusate sodium 100 mg oral capsule  -- 1 cap(s) by mouth 3 times a day  -- Indication: For BM    MiraLax  -- 1 packet(s) by mouth once a day  -- Indication: For BM    Singulair 10 mg oral tablet  -- 1 tab(s) by mouth once a day (in the evening)  -- Indication: For COPD    multivitamin  -- 1 tab(s) by mouth once a day  -- Indication: For supplement    cholecalciferol 2000 intl units oral capsule  -- 1 cap(s) by mouth once a day  -- Indication: For supplement    folic acid 1 mg oral tablet  -- 1 tab(s) by mouth once a day  -- Indication: For supplement

## 2017-06-07 NOTE — DISCHARGE NOTE ADULT - HOSPITAL COURSE
History of Present Illness:  History of Present Illness		  82yo M presents with right hip pain s/p fall. Pt is a community ambulatory at baseline without assistive devices. Pt denies numbness tingling paresthesias in affected limb. Pt denies headstrike or LOC and denies any other orthopedic injuries at this time    PMHx: BPH, HTN, HLD, IBS, MVP, DM, RLS  PSHx: TURP x2  Allergies: sulfa, ambien, compazine  Social: denies tobacco/EtOH  Imaging: XR demonstrate a displaced right femoral neck fracture    PE:  Gen: NAD, AAOx3  RLE: Skin intact, no gross deformity, limb is shortened and ER; no bony TTP at Knee/Foot/Toes, unable to SLR, +logroll/heelstrike, +EHL/FHL/TA/GS, SILT L3-S1, +DP/PT Pulses, compartments soft, no calf TTP B/L    Secondary Survey: Full ROM of unaffected extremities, SILT globally, compartments soft, no bony TTP over bony prominences, no calf TTP, able to SLR with contralateral leg, no TTP along axial spine      Allergies/Medications:   Allergies:        Allergies:  	iodine containing compounds: Drug Category, Rash, Short breath  	fish: Food, Anaphylaxis  	sulfa drugs: Drug Category, Hives  	Compazine: Drug, Other, lock jaw  	zolpidem: Drug, Other, hallucinations, sleep walking    Home Medications:   * Patient Currently Takes Medications as of 10-Oct-2014 11:08 documented in Prescription Writer  · 	acetaminophen-oxyCODONE 325 mg-5 mg oral tablet: 2 tab(s) orally every 6 hours, As Needed -Severe Pain  · 	tamsulosin 0.4 mg oral capsule: 1 cap(s) orally once a day  · 	acetaminophen-oxyCODONE 325 mg-5 mg oral tablet: 1 tab(s) orally every 4 hours, As needed, Moderate Pain  · 	acebutolol 200 mg oral capsule: 1 cap(s) orally 2 times a day  · 	Zetia 10 mg oral tablet: 1 tab(s) orally once a day  · 	aspirin 81 mg oral tablet: 1 tab(s) orally once a day  · 	Actos 15 mg oral tablet: 1 tab(s) orally once a day  · 	allopurinol 300 mg oral tablet: 1 tab(s) orally once a day  · 	finasteride 5 mg oral tablet: 1 tab(s) orally once a day  · 	folic acid 1 mg oral tablet: 1 tab(s) orally once a day  · 	Metamucil: 1 cap(s) orally once a day  · 	Singulair 10 mg oral tablet: 1 tab(s) orally once a day (in the evening)  · 	cholecalciferol 2000 intl units oral capsule: 1 cap(s) orally once a day  · 	Co-Q10 100 mg oral capsule: 1 cap(s) orally once a day  · 	multivitamin: 1 tab(s) orally once a day  · 	Librax 5 mg-2.5 mg oral capsule: 1 cap(s) orally once a day (at bedtime)  · 	Mirapex 0.25 mg oral tablet: 1 tab(s) orally once a day (at bedtime)  · 	Rozerem 8 mg oral tablet: 1 tab(s) orally once a day (at bedtime)  · 	MiraLax: 1 packet(s) orally once a day    . .    PMH/PSH/FH/SH:   Past Medical History:  BPH (Benign Prostatic Hyperplasia)    Cholelithiasis    Darier's disease    Diverticulitis    HTN (hypertension)    Hyperlipidemia    IBS (irritable bowel syndrome)    MVP (mitral valve prolapse)    PVC's (premature ventricular contractions)    Restless leg syndrome    Seasonal allergies    Type 2 diabetes mellitus.    Past Surgical History:  Anal fistula  s/p surgery  Hx of left hemicolectomy    S/P hernia repair  bilateral  S/P tonsillectomy    S/P TURP  x 2.    Hospital Course:   6/6: Pt seen and cleared by Medicine prior to OR. Pt underwent R VERONA with Dr Sheffield. No complications noted.  6/7: Eval by PT:  WBAT History of Present Illness:  History of Present Illness		  82yo M presents with right hip pain s/p fall. Pt is a community ambulatory at baseline without assistive devices. Pt denies numbness tingling paresthesias in affected limb. Pt denies headstrike or LOC and denies any other orthopedic injuries at this time    PMHx: BPH, HTN, HLD, IBS, MVP, DM, RLS  PSHx: TURP x2  Allergies: sulfa, ambien, compazine  Social: denies tobacco/EtOH  Imaging: XR demonstrate a displaced right femoral neck fracture    PE:  Gen: NAD, AAOx3  RLE: Skin intact, no gross deformity, limb is shortened and ER; no bony TTP at Knee/Foot/Toes, unable to SLR, +logroll/heelstrike, +EHL/FHL/TA/GS, SILT L3-S1, +DP/PT Pulses, compartments soft, no calf TTP B/L    Secondary Survey: Full ROM of unaffected extremities, SILT globally, compartments soft, no bony TTP over bony prominences, no calf TTP, able to SLR with contralateral leg, no TTP along axial spine      Allergies/Medications:   Allergies:        Allergies:  	iodine containing compounds: Drug Category, Rash, Short breath  	fish: Food, Anaphylaxis  	sulfa drugs: Drug Category, Hives  	Compazine: Drug, Other, lock jaw  	zolpidem: Drug, Other, hallucinations, sleep walking    Home Medications:   * Patient Currently Takes Medications as of 10-Oct-2014 11:08 documented in Prescription Writer  · 	acetaminophen-oxyCODONE 325 mg-5 mg oral tablet: 2 tab(s) orally every 6 hours, As Needed -Severe Pain  · 	tamsulosin 0.4 mg oral capsule: 1 cap(s) orally once a day  · 	acetaminophen-oxyCODONE 325 mg-5 mg oral tablet: 1 tab(s) orally every 4 hours, As needed, Moderate Pain  · 	acebutolol 200 mg oral capsule: 1 cap(s) orally 2 times a day  · 	Zetia 10 mg oral tablet: 1 tab(s) orally once a day  · 	aspirin 81 mg oral tablet: 1 tab(s) orally once a day  · 	Actos 15 mg oral tablet: 1 tab(s) orally once a day  · 	allopurinol 300 mg oral tablet: 1 tab(s) orally once a day  · 	finasteride 5 mg oral tablet: 1 tab(s) orally once a day  · 	folic acid 1 mg oral tablet: 1 tab(s) orally once a day  · 	Metamucil: 1 cap(s) orally once a day  · 	Singulair 10 mg oral tablet: 1 tab(s) orally once a day (in the evening)  · 	cholecalciferol 2000 intl units oral capsule: 1 cap(s) orally once a day  · 	Co-Q10 100 mg oral capsule: 1 cap(s) orally once a day  · 	multivitamin: 1 tab(s) orally once a day  · 	Librax 5 mg-2.5 mg oral capsule: 1 cap(s) orally once a day (at bedtime)  · 	Mirapex 0.25 mg oral tablet: 1 tab(s) orally once a day (at bedtime)  · 	Rozerem 8 mg oral tablet: 1 tab(s) orally once a day (at bedtime)  · 	MiraLax: 1 packet(s) orally once a day    . .    PMH/PSH/FH/SH:   Past Medical History:  BPH (Benign Prostatic Hyperplasia)    Cholelithiasis    Darier's disease    Diverticulitis    HTN (hypertension)    Hyperlipidemia    IBS (irritable bowel syndrome)    MVP (mitral valve prolapse)    PVC's (premature ventricular contractions)    Restless leg syndrome    Seasonal allergies    Type 2 diabetes mellitus.    Past Surgical History:  Anal fistula  s/p surgery  Hx of left hemicolectomy    S/P hernia repair  bilateral  S/P tonsillectomy    S/P TURP  x 2.    Hospital Course:   6/6: Pt seen and cleared by Medicine prior to OR. Pt underwent R VERONA with Dr Sheffield. No complications noted.  6/7: Eval by PT:  WBAT  stable for discharge to rehab on 6/9/17  H/H: 17.1/52.4

## 2017-06-07 NOTE — DISCHARGE NOTE ADULT - CARE PLAN
Principal Discharge DX:	Closed fracture of neck of right femur, initial encounter  Goal:	improved ambulation and pain control  Instructions for follow-up, activity and diet:	Weight bearing as tolerated Principal Discharge DX:	Closed fracture of neck of right femur, initial encounter  Goal:	improved ambulation and pain control  Instructions for follow-up, activity and diet:	DIET: diabetic/ low salt diet  DVT PROPHYLAXIS: lovenox 40mg daily x 6 weeks  WEIGHT-BEARING STATUS: weight bearing as tolerated; posterior hip precautions  BATHING: keep dressing clean and dry  DRESSING CHANGES: have your surgeon remove aquacell dressing/staples/sutures 14 days after surgery if applicable  Secondary Diagnosis:	Maldonado catheter in place  Instructions for follow-up, activity and diet:	maldonado catheter re-inserted on 6/9/17 for retention; please do a trial of void when patient is more ambulatory

## 2017-06-07 NOTE — PROGRESS NOTE ADULT - ASSESSMENT
PATIENT S/P RIGHT VERONA    Patient is doing well. No fever chills or dyspnea. No chest pain or significant arrhythmia .    Pain  -- / 10    Pain not well controlled consider Pain management service consult    NO Blood loss anemia . no  Ferrous sulfate 325 mg po bid     Prerenal azotemia increase fluid intake by 20 ounces . If not able to take in enough fluid then give 500 cc bolus of 1/2 normal saline over 5 hours.    Thrombocytopenia    Renal insufficiency STABLE FOLLOW TREND DAILY    Diabetic control EXCELLENT

## 2017-06-07 NOTE — DISCHARGE NOTE ADULT - PATIENT PORTAL LINK FT
“You can access the Follow[employer] Patient Portal, offered by [facility], by registering with the following website: [email or web address]”

## 2017-06-07 NOTE — PHYSICAL THERAPY INITIAL EVALUATION ADULT - ADDITIONAL COMMENTS
pt has a few steps to enter and then a full flight to his bedroom.  pt ambulates without AD, community ambulator prior to admit.  pt was driving.  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

## 2017-06-07 NOTE — DISCHARGE NOTE ADULT - PLAN OF CARE
improved ambulation and pain control Weight bearing as tolerated maldonado catheter re-inserted on 6/9/17 for retention; please do a trial of void when patient is more ambulatory DIET: diabetic/ low salt diet  DVT PROPHYLAXIS: lovenox 40mg daily x 6 weeks  WEIGHT-BEARING STATUS: weight bearing as tolerated; posterior hip precautions  BATHING: keep dressing clean and dry  DRESSING CHANGES: have your surgeon remove aquacell dressing/staples/sutures 14 days after surgery if applicable

## 2017-06-08 ENCOUNTER — TRANSCRIPTION ENCOUNTER (OUTPATIENT)
Age: 82
End: 2017-06-08

## 2017-06-08 DIAGNOSIS — S72.001A FRACTURE OF UNSPECIFIED PART OF NECK OF RIGHT FEMUR, INITIAL ENCOUNTER FOR CLOSED FRACTURE: ICD-10-CM

## 2017-06-08 DIAGNOSIS — N40.0 BENIGN PROSTATIC HYPERPLASIA WITHOUT LOWER URINARY TRACT SYMPTOMS: ICD-10-CM

## 2017-06-08 DIAGNOSIS — I10 ESSENTIAL (PRIMARY) HYPERTENSION: ICD-10-CM

## 2017-06-08 DIAGNOSIS — E11.9 TYPE 2 DIABETES MELLITUS WITHOUT COMPLICATIONS: ICD-10-CM

## 2017-06-08 LAB
ANION GAP SERPL CALC-SCNC: 14 MMOL/L — SIGNIFICANT CHANGE UP (ref 5–17)
BUN SERPL-MCNC: 37 MG/DL — HIGH (ref 7–23)
CALCIUM SERPL-MCNC: 9.6 MG/DL — SIGNIFICANT CHANGE UP (ref 8.4–10.5)
CHLORIDE SERPL-SCNC: 97 MMOL/L — SIGNIFICANT CHANGE UP (ref 96–108)
CO2 SERPL-SCNC: 24 MMOL/L — SIGNIFICANT CHANGE UP (ref 22–31)
CREAT SERPL-MCNC: 1.79 MG/DL — HIGH (ref 0.5–1.3)
GLUCOSE SERPL-MCNC: 118 MG/DL — HIGH (ref 70–99)
HBA1C BLD-MCNC: 5.7 % — HIGH (ref 4–5.6)
HCT VFR BLD CALC: 47.2 % — SIGNIFICANT CHANGE UP (ref 39–50)
HGB BLD-MCNC: 15.6 G/DL — SIGNIFICANT CHANGE UP (ref 13–17)
MCHC RBC-ENTMCNC: 29.8 PG — SIGNIFICANT CHANGE UP (ref 27–34)
MCHC RBC-ENTMCNC: 33.1 GM/DL — SIGNIFICANT CHANGE UP (ref 32–36)
MCV RBC AUTO: 90.1 FL — SIGNIFICANT CHANGE UP (ref 80–100)
PLATELET # BLD AUTO: 305 K/UL — SIGNIFICANT CHANGE UP (ref 150–400)
POTASSIUM SERPL-MCNC: 4.6 MMOL/L — SIGNIFICANT CHANGE UP (ref 3.5–5.3)
POTASSIUM SERPL-SCNC: 4.6 MMOL/L — SIGNIFICANT CHANGE UP (ref 3.5–5.3)
RBC # BLD: 5.24 M/UL — SIGNIFICANT CHANGE UP (ref 4.2–5.8)
RBC # FLD: 15.3 % — HIGH (ref 10.3–14.5)
SODIUM SERPL-SCNC: 135 MMOL/L — SIGNIFICANT CHANGE UP (ref 135–145)
WBC # BLD: 15.51 K/UL — HIGH (ref 3.8–10.5)
WBC # FLD AUTO: 15.51 K/UL — HIGH (ref 3.8–10.5)

## 2017-06-08 RX ORDER — TRAMADOL HYDROCHLORIDE 50 MG/1
50 TABLET ORAL EVERY 4 HOURS
Qty: 0 | Refills: 0 | Status: DISCONTINUED | OUTPATIENT
Start: 2017-06-08 | End: 2017-06-09

## 2017-06-08 RX ADMIN — TRAMADOL HYDROCHLORIDE 50 MILLIGRAM(S): 50 TABLET ORAL at 04:49

## 2017-06-08 RX ADMIN — Medication 1 TABLET(S): at 18:13

## 2017-06-08 RX ADMIN — TRAMADOL HYDROCHLORIDE 50 MILLIGRAM(S): 50 TABLET ORAL at 18:15

## 2017-06-08 RX ADMIN — TRAMADOL HYDROCHLORIDE 50 MILLIGRAM(S): 50 TABLET ORAL at 05:19

## 2017-06-08 RX ADMIN — TRAMADOL HYDROCHLORIDE 50 MILLIGRAM(S): 50 TABLET ORAL at 22:23

## 2017-06-08 RX ADMIN — TRAMADOL HYDROCHLORIDE 50 MILLIGRAM(S): 50 TABLET ORAL at 23:00

## 2017-06-08 RX ADMIN — TRAMADOL HYDROCHLORIDE 50 MILLIGRAM(S): 50 TABLET ORAL at 11:50

## 2017-06-08 RX ADMIN — Medication 100 MILLIGRAM(S): at 22:23

## 2017-06-08 RX ADMIN — Medication 100 MILLIGRAM(S): at 05:00

## 2017-06-08 RX ADMIN — TAMSULOSIN HYDROCHLORIDE 0.4 MILLIGRAM(S): 0.4 CAPSULE ORAL at 22:23

## 2017-06-08 RX ADMIN — BENZOCAINE AND MENTHOL 1 LOZENGE: 5; 1 LIQUID ORAL at 04:20

## 2017-06-08 RX ADMIN — FINASTERIDE 5 MILLIGRAM(S): 5 TABLET, FILM COATED ORAL at 11:12

## 2017-06-08 RX ADMIN — TRAMADOL HYDROCHLORIDE 50 MILLIGRAM(S): 50 TABLET ORAL at 11:10

## 2017-06-08 RX ADMIN — Medication 650 MILLIGRAM(S): at 00:25

## 2017-06-08 NOTE — PROGRESS NOTE ADULT - SUBJECTIVE AND OBJECTIVE BOX
Patient seen and examined. Pain controlled. No acute events overnight    MEDICATIONS  (STANDING):  tamsulosin 0.4milliGRAM(s) Oral at bedtime  finasteride 5milliGRAM(s) Oral daily  polyethylene glycol 3350 17Gram(s) Oral daily  docusate sodium 100milliGRAM(s) Oral three times a day  insulin lispro (HumaLOG) corrective regimen sliding scale  SubCutaneous three times a day before meals  insulin lispro (HumaLOG) corrective regimen sliding scale  SubCutaneous at bedtime  dextrose 5%. 1000milliLiter(s) IV Continuous <Continuous>  dextrose 50% Injectable 12.5Gram(s) IV Push once  dextrose 50% Injectable 25Gram(s) IV Push once  dextrose 50% Injectable 25Gram(s) IV Push once  enoxaparin Injectable 40milliGRAM(s) SubCutaneous daily    Allergies    Compazine (Other)  fish (Anaphylaxis)  iodine containing compounds (Rash; Short breath)  sulfa drugs (Hives)  zolpidem (Other)    Intolerances                            16.1   18.86 )-----------( 274      ( 07 Jun 2017 09:27 )             48.4     07 Jun 2017 09:12    136    |  102    |  33     ----------------------------<  123    5.2     |  19     |  1.68     Ca    9.6        07 Jun 2017 09:12        Vital Signs Last 24 Hrs  T(C): 36.8, Max: 38.2 (06-08 @ 00:16)  T(F): 98.2, Max: 100.7 (06-08 @ 00:16)  HR: 86 (71 - 95)  BP: 130/73 (112/65 - 149/69)  BP(mean): --  RR: 18 (16 - 18)  SpO2: 95% (94% - 98%)    Physical Exam  Gen: NAD  RLE:   Dressing c/d/i  +ehl/fhl/ta/gs function  L2-S1 silt  Dp/pt pulse intact  No calf ttp  Compartments soft    A/P:  81y Male sp R VERONA POD 2  Pain control  DVT ppx  PT/WBAT/Posterior hip precautions/abduction pillow/OOB  FU labs  Medical management appreciated  Incentive spirometry  Dispo planning

## 2017-06-08 NOTE — PROGRESS NOTE ADULT - ASSESSMENT
The patient is a 81-year-old male status post fall at home.  As per the patient was taking off his pants, when he tripped and fell onto his right side.  He had severe pain and could not ambulate, was brought to [facility], where he was found to have right hip fracture.  The patient is s/p ORIF of the right hip.

## 2017-06-08 NOTE — PROGRESS NOTE ADULT - SUBJECTIVE AND OBJECTIVE BOX
Patient is a 81y old  Male who presents with a chief complaint of R Femoral Neck Fracture  s/p R VERONA (07 Jun 2017 15:08)      INTERVAL HPI/OVERNIGHT EVENTS:  T(C): 37.6, Max: 38.2 (06-08 @ 00:16)  HR: 109 (86 - 109)  BP: 127/71 (116/69 - 149/69)  RR: 16 (16 - 18)  SpO2: 95% (91% - 95%)  Wt(kg): --  I&O's Summary  I & Os for 24h ending 08 Jun 2017 07:00  =============================================  IN: 1320 ml / OUT: 3400 ml / NET: -2080 ml    I & Os for current day (as of 08 Jun 2017 21:20)  =============================================  IN: 700 ml / OUT: 500 ml / NET: 200 ml      PAST MEDICAL & SURGICAL HISTORY:  Restless leg syndrome  Seasonal allergies  BPH (Benign Prostatic Hyperplasia)  Cholelithiasis  Type 2 diabetes mellitus  Diverticulitis  IBS (irritable bowel syndrome)  Darier&#x27;s disease  MVP (mitral valve prolapse)  PVC&#x27;s (premature ventricular contractions)  Hyperlipidemia  HTN (hypertension)  Hx of left hemicolectomy  Anal fistula: s/p surgery  S/P hernia repair: bilateral  S/P TURP: x 2  S/P tonsillectomy          LABS:                        15.6   15.51 )-----------( 305      ( 08 Jun 2017 08:32 )             47.2     06-08    135  |  97  |  37<H>  ----------------------------<  118<H>  4.6   |  24  |  1.79<H>    Ca    9.6      08 Jun 2017 08:34          CAPILLARY BLOOD GLUCOSE  117 (08 Jun 2017 17:45)  126 (08 Jun 2017 12:08)  106 (08 Jun 2017 07:50)  127 (07 Jun 2017 22:02)            Radiology reports:    MEDICATIONS  (STANDING):  tamsulosin 0.4milliGRAM(s) Oral at bedtime  finasteride 5milliGRAM(s) Oral daily  polyethylene glycol 3350 17Gram(s) Oral daily  docusate sodium 100milliGRAM(s) Oral three times a day  insulin lispro (HumaLOG) corrective regimen sliding scale  SubCutaneous three times a day before meals  insulin lispro (HumaLOG) corrective regimen sliding scale  SubCutaneous at bedtime  dextrose 5%. 1000milliLiter(s) IV Continuous <Continuous>  dextrose 50% Injectable 12.5Gram(s) IV Push once  dextrose 50% Injectable 25Gram(s) IV Push once  dextrose 50% Injectable 25Gram(s) IV Push once  enoxaparin Injectable 40milliGRAM(s) SubCutaneous daily  calcium carbonate 1250 mG + Vitamin D (OsCal 500 + D) 1Tablet(s) Oral two times a day    MEDICATIONS  (PRN):  oxyCODONE IR 10milliGRAM(s) Oral every 4 hours PRN Moderate Pain  oxyCODONE IR 5milliGRAM(s) Oral every 4 hours PRN Mild Pain  ondansetron Injectable 4milliGRAM(s) IV Push every 4 hours PRN Nausea and/or Vomiting  acetaminophen   Tablet 650milliGRAM(s) Oral every 6 hours PRN For Temp greater than 38 C (100.4 F)  acetaminophen   Tablet. 650milliGRAM(s) Oral every 6 hours PRN Mild Pain (1 - 3)  aluminum hydroxide/magnesium hydroxide/simethicone Suspension 30milliLiter(s) Oral four times a day PRN Indigestion  ondansetron Injectable 4milliGRAM(s) IV Push every 6 hours PRN Nausea and/or Vomiting  bisacodyl Suppository 10milliGRAM(s) Rectal daily PRN If no bowel movement by POD#2  senna 2Tablet(s) Oral at bedtime PRN Constipation  magnesium hydroxide Suspension 30milliLiter(s) Oral daily PRN Constipation  dextrose Gel 1Dose(s) Oral once PRN Blood Glucose LESS THAN 70 milliGRAM(s)/deciliter  glucagon  Injectable 1milliGRAM(s) IntraMuscular once PRN Glucose LESS THAN 70 milligrams/deciliter  HYDROmorphone  Injectable 0.5milliGRAM(s) IV Push every 4 hours PRN severe breakthrough pain  acetaminophen  IVPB. 1000milliGRAM(s) IV Intermittent once PRN Mild Pain (1 - 3)  benzocaine 15 mG/menthol 3.6 mG Lozenge 1Lozenge Oral every 2 hours PRN Sore Throat  traMADol 50milliGRAM(s) Oral every 4 hours PRN Moderate Pain (4 - 6)            PHYSICAL EXAM:  GENERAL: NAD, well-groomed, well-developed  HEAD:  Atraumatic, Normocephalic  EYES: EOMI, PERRLA, conjunctiva and sclera clear  ENMT: No tonsillar erythema, exudates, or enlargement; Moist mucous membranes, Good dentition, No lesions  NECK: Supple, No JVD, Normal thyroid  NERVOUS SYSTEM:  Alert & Oriented X3, Good concentration; Motor Strength 5/5 B/L upper and lower extremities; DTRs 2+ intact and symmetric  CHEST/LUNG: Clear to percussion bilaterally; No rales, rhonchi, wheezing, or rubs  HEART: Regular rate and rhythm; No murmurs, rubs, or gallops  ABDOMEN: Soft, Nontender, Nondistended; Bowel sounds present  EXTREMITIES:  2+ Peripheral Pulses, No clubbing, cyanosis, or edema  LYMPH: No lymphadenopathy noted  SKIN: No rashes or lesions

## 2017-06-09 VITALS
RESPIRATION RATE: 16 BRPM | SYSTOLIC BLOOD PRESSURE: 110 MMHG | DIASTOLIC BLOOD PRESSURE: 69 MMHG | TEMPERATURE: 98 F | OXYGEN SATURATION: 92 % | HEART RATE: 100 BPM

## 2017-06-09 LAB
ANION GAP SERPL CALC-SCNC: 16 MMOL/L — SIGNIFICANT CHANGE UP (ref 5–17)
BUN SERPL-MCNC: 36 MG/DL — HIGH (ref 7–23)
CALCIUM SERPL-MCNC: 10.4 MG/DL — SIGNIFICANT CHANGE UP (ref 8.4–10.5)
CHLORIDE SERPL-SCNC: 94 MMOL/L — LOW (ref 96–108)
CO2 SERPL-SCNC: 25 MMOL/L — SIGNIFICANT CHANGE UP (ref 22–31)
CREAT SERPL-MCNC: 1.65 MG/DL — HIGH (ref 0.5–1.3)
GLUCOSE SERPL-MCNC: 114 MG/DL — HIGH (ref 70–99)
HCT VFR BLD CALC: 52.4 % — HIGH (ref 39–50)
HGB BLD-MCNC: 17.1 G/DL — HIGH (ref 13–17)
MCHC RBC-ENTMCNC: 31.3 PG — SIGNIFICANT CHANGE UP (ref 27–34)
MCHC RBC-ENTMCNC: 32.7 GM/DL — SIGNIFICANT CHANGE UP (ref 32–36)
MCV RBC AUTO: 95.6 FL — SIGNIFICANT CHANGE UP (ref 80–100)
PLATELET # BLD AUTO: 358 K/UL — SIGNIFICANT CHANGE UP (ref 150–400)
POTASSIUM SERPL-MCNC: 4.4 MMOL/L — SIGNIFICANT CHANGE UP (ref 3.5–5.3)
POTASSIUM SERPL-SCNC: 4.4 MMOL/L — SIGNIFICANT CHANGE UP (ref 3.5–5.3)
RBC # BLD: 5.48 M/UL — SIGNIFICANT CHANGE UP (ref 4.2–5.8)
RBC # FLD: 13.9 % — SIGNIFICANT CHANGE UP (ref 10.3–14.5)
SODIUM SERPL-SCNC: 135 MMOL/L — SIGNIFICANT CHANGE UP (ref 135–145)
WBC # BLD: 16.3 K/UL — HIGH (ref 3.8–10.5)
WBC # FLD AUTO: 16.3 K/UL — HIGH (ref 3.8–10.5)

## 2017-06-09 PROCEDURE — 85027 COMPLETE CBC AUTOMATED: CPT

## 2017-06-09 PROCEDURE — 85610 PROTHROMBIN TIME: CPT

## 2017-06-09 PROCEDURE — 72170 X-RAY EXAM OF PELVIS: CPT

## 2017-06-09 PROCEDURE — 86850 RBC ANTIBODY SCREEN: CPT

## 2017-06-09 PROCEDURE — 73552 X-RAY EXAM OF FEMUR 2/>: CPT

## 2017-06-09 PROCEDURE — 88311 DECALCIFY TISSUE: CPT

## 2017-06-09 PROCEDURE — C1776: CPT

## 2017-06-09 PROCEDURE — C1889: CPT

## 2017-06-09 PROCEDURE — 96374 THER/PROPH/DIAG INJ IV PUSH: CPT

## 2017-06-09 PROCEDURE — 97165 OT EVAL LOW COMPLEX 30 MIN: CPT

## 2017-06-09 PROCEDURE — 86900 BLOOD TYPING SEROLOGIC ABO: CPT

## 2017-06-09 PROCEDURE — 97530 THERAPEUTIC ACTIVITIES: CPT

## 2017-06-09 PROCEDURE — 97116 GAIT TRAINING THERAPY: CPT

## 2017-06-09 PROCEDURE — 96375 TX/PRO/DX INJ NEW DRUG ADDON: CPT

## 2017-06-09 PROCEDURE — 85730 THROMBOPLASTIN TIME PARTIAL: CPT

## 2017-06-09 PROCEDURE — 80048 BASIC METABOLIC PNL TOTAL CA: CPT

## 2017-06-09 PROCEDURE — 97162 PT EVAL MOD COMPLEX 30 MIN: CPT

## 2017-06-09 PROCEDURE — 71045 X-RAY EXAM CHEST 1 VIEW: CPT

## 2017-06-09 PROCEDURE — 80053 COMPREHEN METABOLIC PANEL: CPT

## 2017-06-09 PROCEDURE — 81001 URINALYSIS AUTO W/SCOPE: CPT

## 2017-06-09 PROCEDURE — 73502 X-RAY EXAM HIP UNI 2-3 VIEWS: CPT

## 2017-06-09 PROCEDURE — 88305 TISSUE EXAM BY PATHOLOGIST: CPT

## 2017-06-09 PROCEDURE — 83036 HEMOGLOBIN GLYCOSYLATED A1C: CPT

## 2017-06-09 PROCEDURE — 99285 EMERGENCY DEPT VISIT HI MDM: CPT | Mod: 25

## 2017-06-09 PROCEDURE — 86901 BLOOD TYPING SEROLOGIC RH(D): CPT

## 2017-06-09 PROCEDURE — 93005 ELECTROCARDIOGRAM TRACING: CPT

## 2017-06-09 RX ORDER — DOCUSATE SODIUM 100 MG
1 CAPSULE ORAL
Qty: 0 | Refills: 0 | DISCHARGE
Start: 2017-06-09

## 2017-06-09 RX ORDER — TRAMADOL HYDROCHLORIDE 50 MG/1
1 TABLET ORAL
Qty: 0 | Refills: 0 | DISCHARGE
Start: 2017-06-09

## 2017-06-09 RX ORDER — OXYCODONE HYDROCHLORIDE 5 MG/1
1 TABLET ORAL
Qty: 0 | Refills: 0 | DISCHARGE
Start: 2017-06-09

## 2017-06-09 RX ORDER — ACETAMINOPHEN 500 MG
2 TABLET ORAL
Qty: 0 | Refills: 0 | DISCHARGE
Start: 2017-06-09

## 2017-06-09 RX ORDER — SENNA PLUS 8.6 MG/1
2 TABLET ORAL
Qty: 0 | Refills: 0 | DISCHARGE
Start: 2017-06-09

## 2017-06-09 RX ORDER — ENOXAPARIN SODIUM 100 MG/ML
40 INJECTION SUBCUTANEOUS
Qty: 0 | Refills: 0 | DISCHARGE
Start: 2017-06-09

## 2017-06-09 RX ADMIN — Medication 100 MILLIGRAM(S): at 05:37

## 2017-06-09 RX ADMIN — Medication 650 MILLIGRAM(S): at 06:15

## 2017-06-09 RX ADMIN — FINASTERIDE 5 MILLIGRAM(S): 5 TABLET, FILM COATED ORAL at 12:14

## 2017-06-09 RX ADMIN — Medication 1 TABLET(S): at 05:37

## 2017-06-09 RX ADMIN — ENOXAPARIN SODIUM 40 MILLIGRAM(S): 100 INJECTION SUBCUTANEOUS at 12:14

## 2017-06-09 RX ADMIN — TRAMADOL HYDROCHLORIDE 50 MILLIGRAM(S): 50 TABLET ORAL at 14:40

## 2017-06-09 RX ADMIN — Medication 650 MILLIGRAM(S): at 05:38

## 2017-06-09 RX ADMIN — BENZOCAINE AND MENTHOL 1 LOZENGE: 5; 1 LIQUID ORAL at 05:37

## 2017-06-09 NOTE — PROGRESS NOTE ADULT - PROBLEM SELECTOR PLAN 1
pain meds as needed  continue physical therapy  wound care as per ortho
pain meds as needed  continue physical therapy  wound care as per ortho

## 2017-06-09 NOTE — PROGRESS NOTE ADULT - ATTENDING COMMENTS
Agree with above exam and plan. NVID, No need for abduction pillow.
SEE ABOVE NOTE
Patient is a 81y old  Male who presents with a chief complaint of R Femoral Neck Fracture.  Improved and cleared for transfer to  rehabilitation today. No new medical complaints at this time. Cleared by orthopedics for discharge to rehabilitation.  Full instructions provided regarding diagnosis, treatment, discharge medications, diet and follow up care on transfer sheet. Medically stable and for discharge to home today as planned.  s/p R VERONA (07 Jun 2017 15:08)

## 2017-06-09 NOTE — PROGRESS NOTE ADULT - SUBJECTIVE AND OBJECTIVE BOX
Patient seen and examined. Pain controlled. No acute events overnight. Pt has been up and ambulating. Pt has been using supplemental oxygen to maintain O2 Sat overnight.     MEDICATIONS  (STANDING):  tamsulosin 0.4milliGRAM(s) Oral at bedtime  finasteride 5milliGRAM(s) Oral daily  polyethylene glycol 3350 17Gram(s) Oral daily  docusate sodium 100milliGRAM(s) Oral three times a day  insulin lispro (HumaLOG) corrective regimen sliding scale  SubCutaneous three times a day before meals  insulin lispro (HumaLOG) corrective regimen sliding scale  SubCutaneous at bedtime  dextrose 5%. 1000milliLiter(s) IV Continuous <Continuous>  dextrose 50% Injectable 12.5Gram(s) IV Push once  dextrose 50% Injectable 25Gram(s) IV Push once  dextrose 50% Injectable 25Gram(s) IV Push once  enoxaparin Injectable 40milliGRAM(s) SubCutaneous daily    Allergies    Compazine (Other)  fish (Anaphylaxis)  iodine containing compounds (Rash; Short breath)  sulfa drugs (Hives)  zolpidem (Other)    Intolerances                          15.6   15.51 )-----------( 305      ( 08 Jun 2017 08:32 )             47.2     Vital Signs Last 24 Hrs  T(C): 37.1, Max: 37.6 (06-08 @ 16:00)  T(F): 98.7, Max: 99.7 (06-08 @ 23:34)  HR: 100 (92 - 109)  BP: 125/75 (116/69 - 136/82)  BP(mean): --  RR: 18 (16 - 18)  SpO2: 95% (91% - 95%)    Physical Exam  Gen: NAD  RLE:   Dressing c/d/i  +ehl/fhl/ta/gs function  L2-S1 silt  Dp/pt pulse intact  No calf ttp  Compartments soft    A/P:  81y Male sp R VERONA POD 3  Pain control  DVT ppx  PT/WBAT/Posterior hip precautions/abduction pillow/OOB  FU labs  Will Follow O2 Saturation closely, encourage IS use  Medical management appreciated  Dispo planning

## 2017-06-09 NOTE — PROGRESS NOTE ADULT - PROBLEM SELECTOR PROBLEM 2
Benign prostatic hyperplasia, presence of lower urinary tract symptoms unspecified, unspecified morphology
Benign prostatic hyperplasia, presence of lower urinary tract symptoms unspecified, unspecified morphology

## 2017-06-09 NOTE — PROGRESS NOTE ADULT - SUBJECTIVE AND OBJECTIVE BOX
Patient is a 81y old  Male who presents with a chief complaint of R Femoral Neck Fracture.  Improved and cleared for transfer to  rehabilitation today. No new medical complaints at this time  s/p R VERONA (07 Jun 2017 15:08)        MEDICATIONS  (STANDING):  tamsulosin 0.4milliGRAM(s) Oral at bedtime  finasteride 5milliGRAM(s) Oral daily  polyethylene glycol 3350 17Gram(s) Oral daily  docusate sodium 100milliGRAM(s) Oral three times a day  insulin lispro (HumaLOG) corrective regimen sliding scale  SubCutaneous three times a day before meals  insulin lispro (HumaLOG) corrective regimen sliding scale  SubCutaneous at bedtime  dextrose 5%. 1000milliLiter(s) IV Continuous <Continuous>  dextrose 50% Injectable 12.5Gram(s) IV Push once  dextrose 50% Injectable 25Gram(s) IV Push once  dextrose 50% Injectable 25Gram(s) IV Push once  enoxaparin Injectable 40milliGRAM(s) SubCutaneous daily  calcium carbonate 1250 mG + Vitamin D (OsCal 500 + D) 1Tablet(s) Oral two times a day    MEDICATIONS  (PRN):  oxyCODONE IR 10milliGRAM(s) Oral every 4 hours PRN Moderate Pain  oxyCODONE IR 5milliGRAM(s) Oral every 4 hours PRN Mild Pain  ondansetron Injectable 4milliGRAM(s) IV Push every 4 hours PRN Nausea and/or Vomiting  acetaminophen   Tablet 650milliGRAM(s) Oral every 6 hours PRN For Temp greater than 38 C (100.4 F)  acetaminophen   Tablet. 650milliGRAM(s) Oral every 6 hours PRN Mild Pain (1 - 3)  aluminum hydroxide/magnesium hydroxide/simethicone Suspension 30milliLiter(s) Oral four times a day PRN Indigestion  ondansetron Injectable 4milliGRAM(s) IV Push every 6 hours PRN Nausea and/or Vomiting  bisacodyl Suppository 10milliGRAM(s) Rectal daily PRN If no bowel movement by POD#2  senna 2Tablet(s) Oral at bedtime PRN Constipation  magnesium hydroxide Suspension 30milliLiter(s) Oral daily PRN Constipation  dextrose Gel 1Dose(s) Oral once PRN Blood Glucose LESS THAN 70 milliGRAM(s)/deciliter  glucagon  Injectable 1milliGRAM(s) IntraMuscular once PRN Glucose LESS THAN 70 milligrams/deciliter  HYDROmorphone  Injectable 0.5milliGRAM(s) IV Push every 4 hours PRN severe breakthrough pain  acetaminophen  IVPB. 1000milliGRAM(s) IV Intermittent once PRN Mild Pain (1 - 3)  benzocaine 15 mG/menthol 3.6 mG Lozenge 1Lozenge Oral every 2 hours PRN Sore Throat  traMADol 50milliGRAM(s) Oral every 4 hours PRN Moderate Pain (4 - 6)      Allergies    Compazine (Other)  fish (Anaphylaxis)  iodine containing compounds (Rash; Short breath)  sulfa drugs (Hives)  zolpidem (Other)    Intolerances        REVIEW OF SYSTEMS:  CONSTITUTIONAL: No fever, weight loss, or fatigue  EYES: No eye pain, visual disturbances, or discharge  ENMT:  No difficulty hearing, tinnitus, vertigo; No sinus or throat pain  NECK: No pain or stiffness  BREASTS: No pain, masses, or nipple discharge  RESPIRATORY: No cough, wheezing, chills or hemoptysis; No shortness of breath  CARDIOVASCULAR: No chest pain, palpitations, dizziness, or leg swelling  GASTROINTESTINAL: No abdominal or epigastric pain. No nausea, vomiting, or hematemesis; No diarrhea or constipation. No melena or hematochezia.  GENITOURINARY: No dysuria, frequency, hematuria, or incontinence  NEUROLOGICAL: No headaches, memory loss, loss of strength, numbness, or tremors  SKIN: No itching, burning, rashes, or lesions   LYMPH NODES: No enlarged glands  ENDOCRINE: No heat or cold intolerance; No hair loss  MUSCULOSKELETAL: No joint pain or swelling; No muscle, back, or extremity pain  PSYCHIATRIC: No depression, anxiety, mood swings, or difficulty sleeping  HEME/LYMPH: No easy bruising, or bleeding gums  ALLERGY AND IMMUNOLOGIC: No hives or eczema    Vital Signs Last 24 Hrs  T(C): 36.7, Max: 37.6 (06-08 @ 23:34)  T(F): 98, Max: 99.7 (06-08 @ 23:34)  HR: 100 (95 - 100)  BP: 110/69 (110/69 - 129/69)  BP(mean): --  RR: 16 (16 - 18)  SpO2: 92% (92% - 95%)    PHYSICAL EXAM:  GENERAL: NAD, well-groomed, well-developed  HEAD:  Atraumatic, Normocephalic  EYES: EOMI, PERRLA, conjunctiva and sclera clear  ENMT: No tonsillar erythema, exudates, or enlargement; Moist mucous membranes, Good dentition, No lesions  NECK: Supple, No JVD, Normal thyroid  NERVOUS SYSTEM:  Alert & Oriented X3, Good concentration; Motor Strength 5/5 B/L upper and lower extremities; DTRs 2+ intact and symmetric  CHEST/LUNG: Clear to percussion bilaterally; No rales, rhonchi, wheezing, or rubs  HEART: Regular rate and rhythm; No murmurs, rubs, or gallops  ABDOMEN: Soft, Nontender, Nondistended; Bowel sounds present  EXTREMITIES:  2+ Peripheral Pulses, No clubbing, cyanosis, or edema  LYMPH: No lymphadenopathy noted  SKIN: No rashes or lesions    LABS:                        17.1   16.3  )-----------( 358      ( 09 Jun 2017 06:42 )             52.4     06-09    135  |  94<L>  |  36<H>  ----------------------------<  114<H>  4.4   |  25  |  1.65<H>    Ca    10.4      09 Jun 2017 06:42          CAPILLARY BLOOD GLUCOSE  116 (09 Jun 2017 11:46)  104 (09 Jun 2017 07:28)      RADIOLOGY & ADDITIONAL TESTS:        Consultant(s):    Care Discussed with Consultants/Other Providers [ ] YES  [ ] NO

## 2017-06-09 NOTE — PROGRESS NOTE ADULT - PROBLEM SELECTOR PROBLEM 1
Closed fracture of neck of right femur, initial encounter
Closed fracture of neck of right femur, initial encounter

## 2017-06-09 NOTE — PROGRESS NOTE ADULT - PROBLEM SELECTOR PROBLEM 3
Type 2 diabetes mellitus without complication, without long-term current use of insulin
Type 2 diabetes mellitus without complication, without long-term current use of insulin

## 2017-06-26 ENCOUNTER — APPOINTMENT (OUTPATIENT)
Dept: ORTHOPEDIC SURGERY | Facility: CLINIC | Age: 82
End: 2017-06-26

## 2017-07-21 ENCOUNTER — APPOINTMENT (OUTPATIENT)
Dept: ORTHOPEDIC SURGERY | Facility: CLINIC | Age: 82
End: 2017-07-21

## 2017-07-21 VITALS — RESPIRATION RATE: 14 BRPM | HEIGHT: 62 IN | WEIGHT: 132 LBS | BODY MASS INDEX: 24.29 KG/M2

## 2017-09-01 ENCOUNTER — APPOINTMENT (OUTPATIENT)
Dept: ORTHOPEDIC SURGERY | Facility: CLINIC | Age: 82
End: 2017-09-01
Payer: MEDICARE

## 2017-09-01 VITALS — RESPIRATION RATE: 14 BRPM | BODY MASS INDEX: 24.29 KG/M2 | HEIGHT: 62 IN | WEIGHT: 132 LBS

## 2017-09-01 PROCEDURE — 99024 POSTOP FOLLOW-UP VISIT: CPT

## 2017-09-01 PROCEDURE — 73502 X-RAY EXAM HIP UNI 2-3 VIEWS: CPT | Mod: RT

## 2017-12-01 ENCOUNTER — APPOINTMENT (OUTPATIENT)
Dept: ORTHOPEDIC SURGERY | Facility: CLINIC | Age: 82
End: 2017-12-01
Payer: MEDICARE

## 2017-12-01 PROCEDURE — 99214 OFFICE O/P EST MOD 30 MIN: CPT

## 2017-12-01 PROCEDURE — 73502 X-RAY EXAM HIP UNI 2-3 VIEWS: CPT | Mod: RT

## 2018-06-27 ENCOUNTER — TRANSCRIPTION ENCOUNTER (OUTPATIENT)
Age: 83
End: 2018-06-27

## 2018-06-28 ENCOUNTER — APPOINTMENT (OUTPATIENT)
Dept: ORTHOPEDIC SURGERY | Facility: CLINIC | Age: 83
End: 2018-06-28
Payer: MEDICARE

## 2018-06-28 DIAGNOSIS — S72.001D FRACTURE OF UNSPECIFIED PART OF NECK OF RIGHT FEMUR, SUBSEQUENT ENCOUNTER FOR CLOSED FRACTURE WITH ROUTINE HEALING: ICD-10-CM

## 2018-06-28 DIAGNOSIS — Z96.641 PRESENCE OF RIGHT ARTIFICIAL HIP JOINT: ICD-10-CM

## 2018-06-28 PROCEDURE — 99214 OFFICE O/P EST MOD 30 MIN: CPT

## 2018-06-28 PROCEDURE — 73502 X-RAY EXAM HIP UNI 2-3 VIEWS: CPT | Mod: RT

## 2018-07-04 NOTE — ED ADULT NURSE NOTE - GASTROINTESTINAL WDL
Abdomen soft, nontender, nondistended, bowel sounds present in all 4 quadrants.

## 2019-03-19 ENCOUNTER — TRANSCRIPTION ENCOUNTER (OUTPATIENT)
Age: 84
End: 2019-03-19

## 2019-03-22 ENCOUNTER — TRANSCRIPTION ENCOUNTER (OUTPATIENT)
Age: 84
End: 2019-03-22

## 2019-06-29 ENCOUNTER — TRANSCRIPTION ENCOUNTER (OUTPATIENT)
Age: 84
End: 2019-06-29

## 2019-06-29 ENCOUNTER — INPATIENT (INPATIENT)
Facility: HOSPITAL | Age: 84
LOS: 3 days | Discharge: HOME HEALTH | DRG: 872 | End: 2019-07-03
Attending: SURGERY | Admitting: SURGERY
Payer: MEDICARE

## 2019-06-29 VITALS
HEART RATE: 94 BPM | TEMPERATURE: 98 F | RESPIRATION RATE: 18 BRPM | DIASTOLIC BLOOD PRESSURE: 67 MMHG | SYSTOLIC BLOOD PRESSURE: 116 MMHG | WEIGHT: 132.06 LBS | OXYGEN SATURATION: 94 % | HEIGHT: 62 IN

## 2019-06-29 DIAGNOSIS — R10.11 RIGHT UPPER QUADRANT PAIN: ICD-10-CM

## 2019-06-29 LAB
ALBUMIN SERPL ELPH-MCNC: 4 G/DL — SIGNIFICANT CHANGE UP (ref 3.3–5)
ALP SERPL-CCNC: 141 U/L — HIGH (ref 40–120)
ALT FLD-CCNC: 188 U/L — HIGH (ref 10–45)
ANION GAP SERPL CALC-SCNC: 18 MMOL/L — HIGH (ref 5–17)
ANISOCYTOSIS BLD QL: SLIGHT — SIGNIFICANT CHANGE UP
AST SERPL-CCNC: 112 U/L — HIGH (ref 10–40)
BASE EXCESS BLDV CALC-SCNC: 0.6 MMOL/L — SIGNIFICANT CHANGE UP (ref -2–2)
BASOPHILS # BLD AUTO: 0 K/UL — SIGNIFICANT CHANGE UP (ref 0–0.2)
BASOPHILS NFR BLD AUTO: 0 % — SIGNIFICANT CHANGE UP (ref 0–2)
BILIRUB SERPL-MCNC: 1.1 MG/DL — SIGNIFICANT CHANGE UP (ref 0.2–1.2)
BUN SERPL-MCNC: 52 MG/DL — HIGH (ref 7–23)
CA-I SERPL-SCNC: 1.13 MMOL/L — SIGNIFICANT CHANGE UP (ref 1.12–1.3)
CALCIUM SERPL-MCNC: 9 MG/DL — SIGNIFICANT CHANGE UP (ref 8.4–10.5)
CHLORIDE BLDV-SCNC: 102 MMOL/L — SIGNIFICANT CHANGE UP (ref 96–108)
CHLORIDE SERPL-SCNC: 94 MMOL/L — LOW (ref 96–108)
CO2 BLDV-SCNC: 28 MMOL/L — SIGNIFICANT CHANGE UP (ref 22–30)
CO2 SERPL-SCNC: 21 MMOL/L — LOW (ref 22–31)
CREAT SERPL-MCNC: 2.39 MG/DL — HIGH (ref 0.5–1.3)
DACRYOCYTES BLD QL SMEAR: SLIGHT — SIGNIFICANT CHANGE UP
EOSINOPHIL # BLD AUTO: 0.1 K/UL — SIGNIFICANT CHANGE UP (ref 0–0.5)
EOSINOPHIL NFR BLD AUTO: 0 % — SIGNIFICANT CHANGE UP (ref 0–6)
GAS PNL BLDV: 127 MMOL/L — LOW (ref 135–145)
GAS PNL BLDV: SIGNIFICANT CHANGE UP
GAS PNL BLDV: SIGNIFICANT CHANGE UP
GLUCOSE BLDV-MCNC: 101 MG/DL — HIGH (ref 70–99)
GLUCOSE SERPL-MCNC: 105 MG/DL — HIGH (ref 70–99)
HCO3 BLDV-SCNC: 26 MMOL/L — SIGNIFICANT CHANGE UP (ref 21–29)
HCT VFR BLD CALC: 47.4 % — SIGNIFICANT CHANGE UP (ref 39–50)
HCT VFR BLDA CALC: 47 % — SIGNIFICANT CHANGE UP (ref 39–50)
HGB BLD CALC-MCNC: 15.3 G/DL — SIGNIFICANT CHANGE UP (ref 13–17)
HGB BLD-MCNC: 15.3 G/DL — SIGNIFICANT CHANGE UP (ref 13–17)
LACTATE BLDV-MCNC: 1.4 MMOL/L — SIGNIFICANT CHANGE UP (ref 0.7–2)
LG PLATELETS BLD QL AUTO: SLIGHT — SIGNIFICANT CHANGE UP
LIDOCAIN IGE QN: 31 U/L — SIGNIFICANT CHANGE UP (ref 7–60)
LYMPHOCYTES # BLD AUTO: 1 K/UL — SIGNIFICANT CHANGE UP (ref 1–3.3)
LYMPHOCYTES # BLD AUTO: 3 % — LOW (ref 13–44)
MCHC RBC-ENTMCNC: 25.8 PG — LOW (ref 27–34)
MCHC RBC-ENTMCNC: 32.3 GM/DL — SIGNIFICANT CHANGE UP (ref 32–36)
MCV RBC AUTO: 79.9 FL — LOW (ref 80–100)
MICROCYTES BLD QL: SLIGHT — SIGNIFICANT CHANGE UP
MONOCYTES # BLD AUTO: 3.2 K/UL — HIGH (ref 0–0.9)
MONOCYTES NFR BLD AUTO: 9 % — SIGNIFICANT CHANGE UP (ref 2–14)
NEUTROPHILS # BLD AUTO: 29.9 K/UL — HIGH (ref 1.8–7.4)
NEUTROPHILS NFR BLD AUTO: 87 % — HIGH (ref 43–77)
NEUTS BAND # BLD: 1 % — SIGNIFICANT CHANGE UP (ref 0–8)
PCO2 BLDV: 47 MMHG — SIGNIFICANT CHANGE UP (ref 35–50)
PH BLDV: 7.36 — SIGNIFICANT CHANGE UP (ref 7.35–7.45)
PLAT MORPH BLD: NORMAL — SIGNIFICANT CHANGE UP
PLATELET # BLD AUTO: 676 K/UL — HIGH (ref 150–400)
PO2 BLDV: 31 MMHG — SIGNIFICANT CHANGE UP (ref 25–45)
POIKILOCYTOSIS BLD QL AUTO: SLIGHT — SIGNIFICANT CHANGE UP
POTASSIUM BLDV-SCNC: 3.9 MMOL/L — SIGNIFICANT CHANGE UP (ref 3.5–5.3)
POTASSIUM SERPL-MCNC: 4.8 MMOL/L — SIGNIFICANT CHANGE UP (ref 3.5–5.3)
POTASSIUM SERPL-SCNC: 4.8 MMOL/L — SIGNIFICANT CHANGE UP (ref 3.5–5.3)
PROT SERPL-MCNC: 6.8 G/DL — SIGNIFICANT CHANGE UP (ref 6–8.3)
RBC # BLD: 5.93 M/UL — HIGH (ref 4.2–5.8)
RBC # FLD: 16.6 % — HIGH (ref 10.3–14.5)
RBC BLD AUTO: SIGNIFICANT CHANGE UP
SAO2 % BLDV: 55 % — LOW (ref 67–88)
SMUDGE CELLS # BLD: PRESENT — SIGNIFICANT CHANGE UP
SODIUM SERPL-SCNC: 133 MMOL/L — LOW (ref 135–145)
WBC # BLD: 34.3 K/UL — HIGH (ref 3.8–10.5)
WBC # FLD AUTO: 34.3 K/UL — HIGH (ref 3.8–10.5)

## 2019-06-29 PROCEDURE — 71250 CT THORAX DX C-: CPT | Mod: 26

## 2019-06-29 PROCEDURE — 99291 CRITICAL CARE FIRST HOUR: CPT | Mod: GC

## 2019-06-29 PROCEDURE — 76705 ECHO EXAM OF ABDOMEN: CPT | Mod: 26,RT

## 2019-06-29 PROCEDURE — 74176 CT ABD & PELVIS W/O CONTRAST: CPT | Mod: 26

## 2019-06-29 RX ORDER — PIPERACILLIN AND TAZOBACTAM 4; .5 G/20ML; G/20ML
3.38 INJECTION, POWDER, LYOPHILIZED, FOR SOLUTION INTRAVENOUS EVERY 8 HOURS
Refills: 0 | Status: DISCONTINUED | OUTPATIENT
Start: 2019-06-29 | End: 2019-07-03

## 2019-06-29 RX ORDER — SODIUM CHLORIDE 9 MG/ML
1000 INJECTION INTRAMUSCULAR; INTRAVENOUS; SUBCUTANEOUS ONCE
Refills: 0 | Status: COMPLETED | OUTPATIENT
Start: 2019-06-29 | End: 2019-06-29

## 2019-06-29 RX ORDER — FLUTICASONE PROPIONATE 50 MCG
1 SPRAY, SUSPENSION NASAL
Refills: 0 | Status: DISCONTINUED | OUTPATIENT
Start: 2019-06-29 | End: 2019-06-29

## 2019-06-29 RX ORDER — PIPERACILLIN AND TAZOBACTAM 4; .5 G/20ML; G/20ML
3.38 INJECTION, POWDER, LYOPHILIZED, FOR SOLUTION INTRAVENOUS ONCE
Refills: 0 | Status: COMPLETED | OUTPATIENT
Start: 2019-06-29 | End: 2019-06-29

## 2019-06-29 RX ORDER — ACETAMINOPHEN 500 MG
650 TABLET ORAL ONCE
Refills: 0 | Status: COMPLETED | OUTPATIENT
Start: 2019-06-29 | End: 2019-06-29

## 2019-06-29 RX ORDER — HEPARIN SODIUM 5000 [USP'U]/ML
5000 INJECTION INTRAVENOUS; SUBCUTANEOUS EVERY 8 HOURS
Refills: 0 | Status: DISCONTINUED | OUTPATIENT
Start: 2019-06-29 | End: 2019-07-03

## 2019-06-29 RX ORDER — SODIUM CHLORIDE 9 MG/ML
1000 INJECTION, SOLUTION INTRAVENOUS
Refills: 0 | Status: DISCONTINUED | OUTPATIENT
Start: 2019-06-29 | End: 2019-07-01

## 2019-06-29 RX ADMIN — SODIUM CHLORIDE 1000 MILLILITER(S): 9 INJECTION INTRAMUSCULAR; INTRAVENOUS; SUBCUTANEOUS at 16:30

## 2019-06-29 RX ADMIN — PIPERACILLIN AND TAZOBACTAM 25 GRAM(S): 4; .5 INJECTION, POWDER, LYOPHILIZED, FOR SOLUTION INTRAVENOUS at 21:39

## 2019-06-29 RX ADMIN — HEPARIN SODIUM 5000 UNIT(S): 5000 INJECTION INTRAVENOUS; SUBCUTANEOUS at 21:39

## 2019-06-29 RX ADMIN — Medication 650 MILLIGRAM(S): at 19:35

## 2019-06-29 RX ADMIN — Medication 650 MILLIGRAM(S): at 17:13

## 2019-06-29 RX ADMIN — SODIUM CHLORIDE 125 MILLILITER(S): 9 INJECTION, SOLUTION INTRAVENOUS at 21:39

## 2019-06-29 RX ADMIN — PIPERACILLIN AND TAZOBACTAM 200 GRAM(S): 4; .5 INJECTION, POWDER, LYOPHILIZED, FOR SOLUTION INTRAVENOUS at 16:40

## 2019-06-29 NOTE — ED ADULT NURSE REASSESSMENT NOTE - NS ED NURSE REASSESS COMMENT FT1
report received from day RN Aida GIRALDO. Pt found resting in semi-mariano position, non-labored respirations, on oxygen via nc at 2L. Pt states his abdominal pain is "better then before", not requesting pain medication at this time. Pt denies n/v, chest pain, difficulty breathing or chills. VS documented. Pt admitted, waiting for bed assignment. Pt left in position of comfort, will reassess

## 2019-06-29 NOTE — ED PROVIDER NOTE - NS ED ROS FT
Constitutional: no fevers, chills  HEENT: no visual changes, no sore throat, no rhinorrhea  CV: no cp  Resp: no sob  GI: +RUQ pain, +n/v, no diarrhea/constipation  : no dysuria, hematuria  MSK: no joint pains  skin: no rashes  neuro: no HA, no confusion  psych: no SI/HI  heme: no LAD

## 2019-06-29 NOTE — ED ADULT NURSE NOTE - NSIMPLEMENTINTERV_GEN_ALL_ED
Implemented All Universal Safety Interventions:  [place] to call system. Call bell, personal items and telephone within reach. Instruct patient to call for assistance. Room bathroom lighting operational. Non-slip footwear when patient is off stretcher. Physically safe environment: no spills, clutter or unnecessary equipment. Stretcher in lowest position, wheels locked, appropriate side rails in place.

## 2019-06-29 NOTE — ED PROVIDER NOTE - ATTENDING CONTRIBUTION TO CARE
ruq abdominal pain, low grade temp  +ruq ttp, no rebound or guarding  pt more ill than clinically appears, w likely sig acute des or other acute surgical abdominal pathology, start w abdominal us and ct. basic labs. abx. likely admit

## 2019-06-29 NOTE — H&P ADULT - NSHPPHYSICALEXAM_GEN_ALL_CORE
General: Alert, NAD  Neuro: A+Ox3  HEENT: NC/AT, no scleral icterus  Cardio: Pulse regular  Resp: Unlabored breathing on 2 liters NC  GI/Abd: Soft, moderately tender in RUQ, mildly distended  Ext: Warm, no edema

## 2019-06-29 NOTE — H&P ADULT - NSICDXPASTSURGICALHX_GEN_ALL_CORE_FT
PAST SURGICAL HISTORY:  Anal fistula s/p surgery    Hx of left hemicolectomy     S/P hernia repair bilateral    S/P tonsillectomy     S/P TURP x 2

## 2019-06-29 NOTE — H&P ADULT - ASSESSMENT
83M with hx of HTN, DM, BHP, HLD, diverticulitis s/p LAR (2012, Dr. Alberts), incisional hernia s/p repair with mesh (2014, Dr. Hutchinson), presenting with acute cholecystitis and choledocholithiasis.    - Admit to surgery  - NPO, IV fluids  - Antibiotics  - GI consult to evaluate for ERCP  - Discussed with attending, Dr. Jasper Boo team surgery  Pager [phone] 83M with hx of HTN, DM, BHP, HLD, diverticulitis s/p LAR (2012, Dr. Alberts), incisional hernia s/p repair with mesh (2014, Dr. Hutchinson), presenting with acute cholecystitis and choledocholithiasis.    - Admit to surgery  - NPO, IV fluids  - Antibiotics  - GI consulted to evaluate for ERCP  - Discussed with attending, Dr. Jasper Boo team surgery  Pager [phone]

## 2019-06-29 NOTE — ED ADULT NURSE NOTE - OBJECTIVE STATEMENT
83 year old male with Hx of diverticulitis and previous colon resection presents to the ED complaining of RLQ pain and epigastric pain that began on Wednesday after eating some peppers that he states he's had bad reactions to in the past. Pt is A&O x 4, VSS, afebrile, ambulating independently. Pt denies fever, chills, NVD, SOB, or chest pain. IV placed, labs drawn, bed in low position, safety measures in place. family at bedside, 20G IV placed in right forearm. Pt denies urinary symptoms.

## 2019-06-29 NOTE — ED PROVIDER NOTE - PROGRESS NOTE DETAILS
pt w cholangitis vs cholecystis. abx given . awaiting surg recs -Ector Barahona MD- Elizabeth Goldberger PGY-2: pt signed out to me pending surgery recs. Called from surgery to adm pt in fluid balance, no add fluid

## 2019-06-29 NOTE — H&P ADULT - ATTENDING COMMENTS
I saw and examined the pt and discussed the tx plan with the House Staff. I agree with the exam and plan as documented in the above H&P.  Pt with fever on admission, high WBC, ongoing abd pain.  CT with gallstones in GB, cystic duct and CBD. GB inflamed and very distended.  Plans for ERCP tomorrow noted, agree.  It is best to obtain quick source control of his infection, given overall picture with high WBC, CT appearance of GB and prior h/o mesh, best approach for the cholecystitis is perc des, will ask IR to perform today - my team placed a call to them this morning.  Massiel Cam MD

## 2019-06-29 NOTE — ED PROVIDER NOTE - CLINICAL SUMMARY MEDICAL DECISION MAKING FREE TEXT BOX
82yo M h/o diverticulitis s/p partial colonic resection p/w epigastric and RUQ pain x 4d. exam significant for RUQ pain, + murphys. concern for cholelithiasis/cholecystitis/biliary colic. less likely bowel obstruction given normal bowel function.

## 2019-06-29 NOTE — H&P ADULT - NSICDXPASTMEDICALHX_GEN_ALL_CORE_FT
PAST MEDICAL HISTORY:  BPH (Benign Prostatic Hyperplasia)     Cholelithiasis     Darier's disease     Diverticulitis     HTN (hypertension)     Hyperlipidemia     IBS (irritable bowel syndrome)     MVP (mitral valve prolapse)     PVC's (premature ventricular contractions)     Restless leg syndrome     Seasonal allergies     Type 2 diabetes mellitus

## 2019-06-29 NOTE — ED PROVIDER NOTE - PHYSICAL EXAMINATION
Vitals: WNL  Gen: laying comfortably in NAD  Head: NCAT  ENT: sclerae white, anicterus, moist mucous membranes. No exudates. Neck supple, no LAD,  no carotid bruits, no JVD  CV: RRR. Audible S1 and S2. No murmurs, rubs, gallops, S3, nor S4, 2+ radial and DP pulses   Pulm: Clear to auscultation bilaterally. No wheezes, rales, or rhonchi  Abd: soft, normoactive BS x4, +RUQ pain, + murphys, mildly distended (baseline per pt), no rebound, no guarding, no rashes  Musculoskeletal:  No peripheral edema  Skin: no lesions or scars noted  Neurologic: AAOx3  : no CVA tenderness  Psych: no SI/HI Vitals: WNL  Gen: laying comfortably in NAD  Head: NCAT  ENT: +scleral icterus, moist mucous membranes. No exudates. Neck supple, no LAD,  no carotid bruits, no JVD  CV: RRR. Audible S1 and S2. No murmurs, rubs, gallops, S3, nor S4, 2+ radial and DP pulses   Pulm: Clear to auscultation bilaterally. No wheezes, rales, or rhonchi  Abd: soft, normoactive BS x4, +RUQ pain, + murphys, mildly distended (baseline per pt), no rebound, no guarding, no rashes  Musculoskeletal:  No peripheral edema  Skin: no lesions or scars noted  Neurologic: AAOx3  : no CVA tenderness  Psych: no SI/HI

## 2019-06-29 NOTE — H&P ADULT - NSHPLABSRESULTS_GEN_ALL_CORE
T(C): 38.1 (06-29-19 @ 16:39), Max: 38.1 (06-29-19 @ 16:39)  HR: 95 (06-29-19 @ 16:42) (94 - 99)  BP: 131/64 (06-29-19 @ 16:42) (116/67 - 131/64)  RR: 20 (06-29-19 @ 16:42) (18 - 20)  SpO2: 95% (06-29-19 @ 16:42) (91% - 95%)    LABS:                        15.3   34.3  )-----------( 676      ( 29 Jun 2019 15:33 )             47.4     29 Jun 2019 15:33    133    |  94     |  52     ----------------------------<  105    4.8     |  21     |  2.39     Ca    9.0        29 Jun 2019 15:33    TPro  6.8    /  Alb  4.0    /  TBili  1.1    /  DBili  x      /  AST  112    /  ALT  188    /  AlkPhos  141    29 Jun 2019 15:33

## 2019-06-29 NOTE — ED PROVIDER NOTE - OBJECTIVE STATEMENT
82yo M h/o diverticulitis s/p partial colonic resection p/w epigastric and RUQ pain x 4d. Pt recalled he ate black pepper which usually makes him feel sick and may have had some chicken with black pepper prior to this starting. + vomiting several days ago. Unable to tolerate PO. Denies f/c, diarrhea/constiaption, urinary symptoms. Normal bowel movements, last yesterday without blood. seen by urgent care who sent him here for further w/u.

## 2019-06-30 LAB
ALBUMIN SERPL ELPH-MCNC: 3.2 G/DL — LOW (ref 3.3–5)
ALP SERPL-CCNC: 112 U/L — SIGNIFICANT CHANGE UP (ref 40–120)
ALT FLD-CCNC: 119 U/L — HIGH (ref 10–45)
ANION GAP SERPL CALC-SCNC: 13 MMOL/L — SIGNIFICANT CHANGE UP (ref 5–17)
ANION GAP SERPL CALC-SCNC: 19 MMOL/L — HIGH (ref 5–17)
APTT BLD: 30.9 SEC — SIGNIFICANT CHANGE UP (ref 27.5–36.3)
AST SERPL-CCNC: 49 U/L — HIGH (ref 10–40)
BILIRUB DIRECT SERPL-MCNC: 0.4 MG/DL — HIGH (ref 0–0.2)
BILIRUB INDIRECT FLD-MCNC: 0.6 MG/DL — SIGNIFICANT CHANGE UP (ref 0.2–1)
BILIRUB SERPL-MCNC: 1 MG/DL — SIGNIFICANT CHANGE UP (ref 0.2–1.2)
BUN SERPL-MCNC: 35 MG/DL — HIGH (ref 7–23)
BUN SERPL-MCNC: 41 MG/DL — HIGH (ref 7–23)
CALCIUM SERPL-MCNC: 8 MG/DL — LOW (ref 8.4–10.5)
CALCIUM SERPL-MCNC: 8.3 MG/DL — LOW (ref 8.4–10.5)
CHLORIDE SERPL-SCNC: 98 MMOL/L — SIGNIFICANT CHANGE UP (ref 96–108)
CHLORIDE SERPL-SCNC: 99 MMOL/L — SIGNIFICANT CHANGE UP (ref 96–108)
CO2 SERPL-SCNC: 19 MMOL/L — LOW (ref 22–31)
CO2 SERPL-SCNC: 22 MMOL/L — SIGNIFICANT CHANGE UP (ref 22–31)
CREAT SERPL-MCNC: 1.8 MG/DL — HIGH (ref 0.5–1.3)
CREAT SERPL-MCNC: 1.96 MG/DL — HIGH (ref 0.5–1.3)
GLUCOSE SERPL-MCNC: 72 MG/DL — SIGNIFICANT CHANGE UP (ref 70–99)
GLUCOSE SERPL-MCNC: 89 MG/DL — SIGNIFICANT CHANGE UP (ref 70–99)
GRAM STN FLD: SIGNIFICANT CHANGE UP
HCT VFR BLD CALC: 42.1 % — SIGNIFICANT CHANGE UP (ref 39–50)
HCT VFR BLD CALC: 47.6 % — SIGNIFICANT CHANGE UP (ref 39–50)
HGB BLD-MCNC: 13.3 G/DL — SIGNIFICANT CHANGE UP (ref 13–17)
HGB BLD-MCNC: 15.3 G/DL — SIGNIFICANT CHANGE UP (ref 13–17)
INR BLD: 1.18 RATIO — HIGH (ref 0.88–1.16)
MAGNESIUM SERPL-MCNC: 1.9 MG/DL — SIGNIFICANT CHANGE UP (ref 1.6–2.6)
MAGNESIUM SERPL-MCNC: 1.9 MG/DL — SIGNIFICANT CHANGE UP (ref 1.6–2.6)
MCHC RBC-ENTMCNC: 24.9 PG — LOW (ref 27–34)
MCHC RBC-ENTMCNC: 26 PG — LOW (ref 27–34)
MCHC RBC-ENTMCNC: 31.6 GM/DL — LOW (ref 32–36)
MCHC RBC-ENTMCNC: 32.2 GM/DL — SIGNIFICANT CHANGE UP (ref 32–36)
MCV RBC AUTO: 78.8 FL — LOW (ref 80–100)
MCV RBC AUTO: 80.7 FL — SIGNIFICANT CHANGE UP (ref 80–100)
PHOSPHATE SERPL-MCNC: 2.7 MG/DL — SIGNIFICANT CHANGE UP (ref 2.5–4.5)
PHOSPHATE SERPL-MCNC: 2.9 MG/DL — SIGNIFICANT CHANGE UP (ref 2.5–4.5)
PLATELET # BLD AUTO: 590 K/UL — HIGH (ref 150–400)
PLATELET # BLD AUTO: 613 K/UL — HIGH (ref 150–400)
POTASSIUM SERPL-MCNC: 4.3 MMOL/L — SIGNIFICANT CHANGE UP (ref 3.5–5.3)
POTASSIUM SERPL-MCNC: 4.4 MMOL/L — SIGNIFICANT CHANGE UP (ref 3.5–5.3)
POTASSIUM SERPL-SCNC: 4.3 MMOL/L — SIGNIFICANT CHANGE UP (ref 3.5–5.3)
POTASSIUM SERPL-SCNC: 4.4 MMOL/L — SIGNIFICANT CHANGE UP (ref 3.5–5.3)
PROT SERPL-MCNC: 5.5 G/DL — LOW (ref 6–8.3)
PROTHROM AB SERPL-ACNC: 13.6 SEC — HIGH (ref 10–12.9)
RBC # BLD: 5.34 M/UL — SIGNIFICANT CHANGE UP (ref 4.2–5.8)
RBC # BLD: 5.9 M/UL — HIGH (ref 4.2–5.8)
RBC # FLD: 16.5 % — HIGH (ref 10.3–14.5)
RBC # FLD: 18.2 % — HIGH (ref 10.3–14.5)
SODIUM SERPL-SCNC: 134 MMOL/L — LOW (ref 135–145)
SODIUM SERPL-SCNC: 136 MMOL/L — SIGNIFICANT CHANGE UP (ref 135–145)
SPECIMEN SOURCE: SIGNIFICANT CHANGE UP
WBC # BLD: 23.4 K/UL — HIGH (ref 3.8–10.5)
WBC # BLD: 26.47 K/UL — HIGH (ref 3.8–10.5)
WBC # FLD AUTO: 23.4 K/UL — HIGH (ref 3.8–10.5)
WBC # FLD AUTO: 26.47 K/UL — HIGH (ref 3.8–10.5)

## 2019-06-30 PROCEDURE — 47490 INCISION OF GALLBLADDER: CPT

## 2019-06-30 PROCEDURE — 99222 1ST HOSP IP/OBS MODERATE 55: CPT | Mod: GC

## 2019-06-30 RX ORDER — ACETAMINOPHEN 500 MG
650 TABLET ORAL EVERY 6 HOURS
Refills: 0 | Status: DISCONTINUED | OUTPATIENT
Start: 2019-06-30 | End: 2019-07-03

## 2019-06-30 RX ORDER — MONTELUKAST 4 MG/1
10 TABLET, CHEWABLE ORAL DAILY
Refills: 0 | Status: DISCONTINUED | OUTPATIENT
Start: 2019-06-30 | End: 2019-07-03

## 2019-06-30 RX ORDER — HYDROMORPHONE HYDROCHLORIDE 2 MG/ML
0.25 INJECTION INTRAMUSCULAR; INTRAVENOUS; SUBCUTANEOUS
Refills: 0 | Status: DISCONTINUED | OUTPATIENT
Start: 2019-06-30 | End: 2019-06-30

## 2019-06-30 RX ORDER — MORPHINE SULFATE 50 MG/1
2 CAPSULE, EXTENDED RELEASE ORAL EVERY 4 HOURS
Refills: 0 | Status: DISCONTINUED | OUTPATIENT
Start: 2019-06-30 | End: 2019-07-02

## 2019-06-30 RX ORDER — POLYETHYLENE GLYCOL 3350 17 G/17G
17 POWDER, FOR SOLUTION ORAL DAILY
Refills: 0 | Status: DISCONTINUED | OUTPATIENT
Start: 2019-06-30 | End: 2019-07-03

## 2019-06-30 RX ORDER — SENNA PLUS 8.6 MG/1
2 TABLET ORAL AT BEDTIME
Refills: 0 | Status: DISCONTINUED | OUTPATIENT
Start: 2019-06-30 | End: 2019-07-03

## 2019-06-30 RX ORDER — DOCUSATE SODIUM 100 MG
100 CAPSULE ORAL
Refills: 0 | Status: DISCONTINUED | OUTPATIENT
Start: 2019-06-30 | End: 2019-07-03

## 2019-06-30 RX ORDER — ACETAMINOPHEN 500 MG
1000 TABLET ORAL ONCE
Refills: 0 | Status: COMPLETED | OUTPATIENT
Start: 2019-06-30 | End: 2019-06-30

## 2019-06-30 RX ADMIN — HEPARIN SODIUM 5000 UNIT(S): 5000 INJECTION INTRAVENOUS; SUBCUTANEOUS at 21:13

## 2019-06-30 RX ADMIN — HYDROMORPHONE HYDROCHLORIDE 0.25 MILLIGRAM(S): 2 INJECTION INTRAMUSCULAR; INTRAVENOUS; SUBCUTANEOUS at 14:30

## 2019-06-30 RX ADMIN — PIPERACILLIN AND TAZOBACTAM 25 GRAM(S): 4; .5 INJECTION, POWDER, LYOPHILIZED, FOR SOLUTION INTRAVENOUS at 05:27

## 2019-06-30 RX ADMIN — SODIUM CHLORIDE 125 MILLILITER(S): 9 INJECTION, SOLUTION INTRAVENOUS at 05:27

## 2019-06-30 RX ADMIN — HEPARIN SODIUM 5000 UNIT(S): 5000 INJECTION INTRAVENOUS; SUBCUTANEOUS at 14:57

## 2019-06-30 RX ADMIN — PIPERACILLIN AND TAZOBACTAM 25 GRAM(S): 4; .5 INJECTION, POWDER, LYOPHILIZED, FOR SOLUTION INTRAVENOUS at 21:13

## 2019-06-30 RX ADMIN — Medication 1000 MILLIGRAM(S): at 17:45

## 2019-06-30 RX ADMIN — PIPERACILLIN AND TAZOBACTAM 25 GRAM(S): 4; .5 INJECTION, POWDER, LYOPHILIZED, FOR SOLUTION INTRAVENOUS at 14:50

## 2019-06-30 RX ADMIN — HYDROMORPHONE HYDROCHLORIDE 0.25 MILLIGRAM(S): 2 INJECTION INTRAMUSCULAR; INTRAVENOUS; SUBCUTANEOUS at 15:11

## 2019-06-30 RX ADMIN — HYDROMORPHONE HYDROCHLORIDE 0.25 MILLIGRAM(S): 2 INJECTION INTRAMUSCULAR; INTRAVENOUS; SUBCUTANEOUS at 14:18

## 2019-06-30 RX ADMIN — Medication 400 MILLIGRAM(S): at 16:58

## 2019-06-30 RX ADMIN — SODIUM CHLORIDE 125 MILLILITER(S): 9 INJECTION, SOLUTION INTRAVENOUS at 21:14

## 2019-06-30 RX ADMIN — HYDROMORPHONE HYDROCHLORIDE 0.25 MILLIGRAM(S): 2 INJECTION INTRAMUSCULAR; INTRAVENOUS; SUBCUTANEOUS at 15:25

## 2019-06-30 NOTE — CHART NOTE - NSCHARTNOTEFT_GEN_A_CORE
SURGERY POST-OP NOTE:    S: Patient underwent percutaneous cholecystostomy w IR and tolerated procedure without any complications, and sent to PACU. Pt has been tachycardic to low 100's w/ temperature gradually rising w/ most recent measurement to 99.9F. Patient denies f/c, chest pain, shortness of breath, nausea, vomiting, lightheadedness, or dizziness.     O:  T(C): 37.7 (06-30-19 @ 15:45), Max: 37.7 (06-30-19 @ 15:45)  HR: 106 (06-30-19 @ 15:45) (93 - 106)  BP: 156/83 (06-30-19 @ 15:45) (132/56 - 158/76)  RR: 20 (06-30-19 @ 15:45) (16 - 20)  SpO2: 98% (06-30-19 @ 15:45) (92% - 100%)  Wt(kg): --                        13.3   26.47 )-----------( 590      ( 30 Jun 2019 09:18 )             42.1        06-30    136  |  98  |  41<H>  ----------------------------<  72  4.3   |  19<L>  |  1.96<H>    Ca    8.3<L>      30 Jun 2019 06:39  Phos  2.7     06-30  Mg     1.9     06-30      Gen: NAD  HEENT: NC/AT  RESP: nonlabored breathing, 2L NC  Abd: Soft, mild epigastric tenderness, moderately distended, perc des drain w/ SS output,  No palpable masses.     Assessment/Plan:  83y Male now s/p percutaneous cholecystostomy by IR    - stat labs ordered, f/u  - monitor vital signs  - will monitor for septic signs  - Pain control  - NPO  - DVT PPX  - Out of bed and encourage early ambulation  - Incentive spirometry            Benito Campos PGY-1  [facility]  [phone]

## 2019-06-30 NOTE — PROGRESS NOTE ADULT - ASSESSMENT
83M with hx of HTN, DM, BHP, HLD, diverticulitis s/p LAR (2012, Dr. Alberts), incisional hernia s/p repair with mesh (2014, Dr. Hutchinson), presenting with acute cholecystitis and choledocholithiasis.    - NPO, IV fluids  - Antibiotics  - GI consulted to evaluate for ERCP  - DVT ppx    Green team surgery  Pager [phone]

## 2019-06-30 NOTE — PROGRESS NOTE ADULT - SUBJECTIVE AND OBJECTIVE BOX
GENERAL SURGERY DAILY PROGRESS NOTE:       Subjective: Patient examined at bedside. BAUDILIO.          Objective: Patient examined at bedside. Continues to have abdominal pain, denies nausea/vomiting.        Vital Signs Last 24 Hrs  T(C): 37.3 (30 Jun 2019 05:03), Max: 38.1 (29 Jun 2019 16:39)  T(F): 99.2 (30 Jun 2019 05:03), Max: 100.5 (29 Jun 2019 16:39)  HR: 97 (30 Jun 2019 05:03) (84 - 99)  BP: 130/70 (30 Jun 2019 05:03) (103/59 - 131/64)  BP(mean): --  RR: 18 (30 Jun 2019 05:03) (18 - 20)  SpO2: 93% (30 Jun 2019 05:03) (91% - 96%)    I&O's Detail    29 Jun 2019 07:01  -  30 Jun 2019 05:28  --------------------------------------------------------  IN:  Total IN: 0 mL    OUT:    Voided: 400 mL  Total OUT: 400 mL    Total NET: -400 mL            General: NAD, well-nourished  HEENT: Atraumatic, EOMI  Resp: Breathing comfortably on RA  CV: Normal sinus rhythm  Abd: Soft, moderately tender in RUQ, mildly distended  Ext: ROMIx4, motor strength intact x 4      LABS:                        15.3   34.3  )-----------( 676      ( 29 Jun 2019 15:33 )             47.4     06-29    133<L>  |  94<L>  |  52<H>  ----------------------------<  105<H>  4.8   |  21<L>  |  2.39<H>    Ca    9.0      29 Jun 2019 15:33    TPro  6.8  /  Alb  4.0  /  TBili  1.1  /  DBili  x   /  AST  112<H>  /  ALT  188<H>  /  AlkPhos  141<H>  06-29          RADIOLOGY & ADDITIONAL STUDIES:    MEDICATIONS  (STANDING):  heparin  Injectable 5000 Unit(s) SubCutaneous every 8 hours  lactated ringers. 1000 milliLiter(s) (125 mL/Hr) IV Continuous <Continuous>  piperacillin/tazobactam IVPB.. 3.375 Gram(s) IV Intermittent every 8 hours    MEDICATIONS  (PRN): room air

## 2019-06-30 NOTE — PROGRESS NOTE ADULT - SUBJECTIVE AND OBJECTIVE BOX
Vascular & Interventional Radiology Post-Procedure Note    Pre-Procedure Diagnosis: Acute Cholecystitis  Post-Procedure Diagnosis: Same as pre.  Indications for Procedure: Sepsis    Operators: Slime SANTANA, Breanne SANTANA      Procedure Details/Findings: Successful placement of an 8.5F cholecystostomy tube via a transhepatic route. Aspirated 120ml of brown-yellow purulent appearing fluid from GB    Complications: None  Estimated Blood Loss: Minimal  Specimen: Sent for C/S  Contrast: See Report  Sedation: MAC  Patient Condition/Disposition: Stable. PACU then Floor    Plan:     Tube check in 4-6 weeks.

## 2019-06-30 NOTE — PRE-ANESTHESIA EVALUATION ADULT - NSANTHPEFT_GEN_ALL_CORE
Continued Stay Review    Date: 8/24    Vital Signs: BP (!) 192/81 (BP Location: Right arm)   Pulse 60   Temp 98 6 °F (37 °C) (Oral)   Resp 18   Ht 5' (1 524 m)   Wt 43 8 kg (96 lb 9 oz)   SpO2 96%   BMI 18 86 kg/m²     Medications:   Scheduled Meds:   Current Facility-Administered Medications:  amLODIPine 2 5 mg Oral Daily [facility] MD Beryl   aspirin 325 mg Oral Daily Ish Weeks MD   atorvastatin 10 mg Oral Daily With Dinner Dora Miles PA-C   dextran 70-hypromellose 2 drop Both Eyes Daily Jewelene Gosselin, MD   hydrALAZINE 5 mg Intravenous Q6H PRN Mumtaz MD Thomas   levothyroxine 25 mcg Oral Daily Jewelene Gosselin, MD   metoprolol tartrate 12 5 mg Oral Q12H [address] Dora Miles PA-C   ondansetron 4 mg Intravenous Q6H PRN Jewelene Gosselin, MD   pantoprazole 40 mg Oral Daily Jewelene Gosselin, MD     Continuous Infusions:    PRN Meds: hydrALAZINE    ondansetron    Abnormal Labs/Diagnostic Results: trop   0 05    Age/Sex: 82 y o  female   Assessment/Plan: Assessment:     Principal Problem:    Syncope  Active Problems:    Hypothyroidism    Cardiac pacemaker in situ    CVA (cerebral vascular accident) ([facility] [address] )    Hypertension    Dementia  Plan:      Syncope   Assessment & Plan     Near-syncope   Aspirin information gathered was most likely related to vasovagal syncope    CTA chest Ascending thoracic aortic aneurysm measuring up to 50 mm   Questionable haziness at the posterior proximal margin of the aneurysm within the mediastinum on this examination which is unfortunately significantly limited as a result of streak artifact  related to pacemaker generator and because the patient could not bring arms above head or tolerate breath-hold examination   The possibility of leaking of ascending thoracic aortic aneurysm cannot therefore unfortunately not be confidently excluded; -I spoke with Dr White regarding possible leaking thoracic aortic aneurysm-he does not recommend any intervention at this time-says even with leaking would not be a surgical candidate; he recommends following her pain and she denies any chest pain presently if any change could repeat ct scan        -Blood pressure control-start norvasc  Discharge planning     S/p pacemaker-cardiology consult appreciated conservative rx       Hypertension   Assessment & Plan     Blood pressure is stable for age  Continue home medication          CVA (cerebral vascular accident) [facility])   Assessment & Plan     Previously  Continue  secondary prevention          Cardiac pacemaker in situ   Assessment & Plan     No acute events          Hypothyroidism   Assessment & Plan     Continue levothyroxine           Ss consult for discharge planning-needs str   VTE Pharmacologic Prophylaxis:   Pharmacologic: Heparin  Mechanical VTE Prophylaxis in Place: Yes   Patient Centered Rounds: I have performed bedside rounds with nursing staff today    Discussions with Specialists or Other Care Team Provider:    Education and Discussions with Family / Patient:    Time Spent for Care: 30 minutes  More than 50% of total time spent on counseling and coordination of care as described above    Current Length of Stay: 1 day(s)   Current Patient Status: Inpatient   Certification Statement: The patient will continue to require additional inpatient hospital stay due to possible discharge  [address]: STR      Discharge Plan: TBD       Cardiology note  8/24  ASSESSMENT & PLAN   1  Chest pain:  New onset, started earlier this morning  Patient cannot really describe the quality of chest pain  Will order an EKG and serial troponins  1st troponin today 0 05  Echo ordered, will assess EF and regional wall motion abnormalities  Continue aspirin, statin, Lopressor    Overall conservative management given advanced age and comorbidities    2   Near syncope, abnormal EKG:  -First troponin today 0 05   -EKG shows NSR, left axis deviation, RBBB, PACs, T-wave inversions in inferolateral leads  -will opt for conservative medical management given advanced age and comorbidities  -echo pending, will assess EF and regional wall motion abnormalities   3   Ascending aortic aneurysm:  -CTA chest shows ascending thoracic aortic aneurysm measuring up to 50 mm   Also notes questionable hazy next at posterior proximal margin of aneurysm, possibility of leaking of ascending thoracic aortic aneurysm cannot be confidently excluded  -control BP   Will add Lopressor  -will opt for conservative medical management   Patient appears stable on exam   -echo pending as above   4   CAD:  Conservative medical management given advanced age and comorbidities   Echo ordered as above   Continue aspirin, statin, beta-blocker    5   Hypertension:   BP labile, patient has spikes of elevated BP and then improvement  Last recorded /81  However, last 2 BP readings before then were controlled  Will continue to monitor    6   Hyperlipidemia:  Continue statin   7   History of CVA: Continue ASA, statin  reviewed

## 2019-06-30 NOTE — PROVIDER CONTACT NOTE (OTHER) - ACTION/TREATMENT ORDERED:
Pt educated on importance of PAS, risks of refusal including risk for DVT, importance of frequent ambulation. Pt verbalized understanding. Will continue to monitor.

## 2019-06-30 NOTE — PROGRESS NOTE ADULT - SUBJECTIVE AND OBJECTIVE BOX
Vascular & Interventional Radiology Pre-Procedure Note    Procedure Name: Percutaneous Cholecystostomy    HPI: 83y Male with acute cholecystitis and choledocholithiasis and leukocytosis.     Allergies: Compazine (Other)  iodine containing compounds (Rash; Short breath)  sulfa drugs (Hives)  zolpidem (Other)    Medications:  heparin  Injectable: 5000 Unit(s) SubCutaneous (06-29 @ 21:39)  piperacillin/tazobactam IVPB.: 200 mL/Hr IV Intermittent (06-29 @ 16:40)  piperacillin/tazobactam IVPB..: 25 mL/Hr IV Intermittent (06-30 @ 05:27)    Data:  157.48  59.9  T(C): 37.2  HR: 96  BP: 135/70  RR: 18  SpO2: 95%    -WBC 26.47 / HgB 13.3 / Hct 42.1 / Plt 590  -Na 136 / Cl 98 / BUN 41 / Glucose 72  -K 4.3 / CO2 19 / Cr 1.96  - / Alk Phos 112 / T.Bili 1.0  -INR1.18    Imaging: CT A/P on 06/29/19    Plan:   -83y Male presents for Percutaneous Cholecystostomy  -Risks/Benefits/alternatives explained with the patient and/or healthcare proxy and witnessed informed consent obtained.

## 2019-06-30 NOTE — CONSULT NOTE ADULT - SUBJECTIVE AND OBJECTIVE BOX
Chief Complaint:  Patient is a 83y old  Male who presents with a chief complaint of Cholecystitis (30 Jun 2019 05:27)      HPI:  83 year old male with HTN, DM, BHP, HLD, diverticulitis s/p LAR (2012, Dr. Alberts), incisional hernia s/p repair with mesh (2014, Dr. Hutchinson), presenting with 3 days of RUQ pain, nausea, and emesis. He has never had pain like this in the past. He has eaten very little since the pain began. Denies constipation, diarrhea, or shortness of breath.     In the ED he is febrile to 38.1, WBC count is 34.3, bilirubin 1.1, alk phos 141, AST//188, lactate 1.4, and imaging shows cholecystitis with stones in the CBD    Allergies:  Compazine (Other)  fish (Anaphylaxis)  iodine containing compounds (Rash; Short breath)  sulfa drugs (Hives)  zolpidem (Other)      Home Medications:   Patient Currently Takes Medications as of 29-Jun-2019 21:46 documented in Structured Notes  · 	aspirin 81 mg oral tablet: Last Dose Taken:  , 1 tab(s) orally once a day  · 	finasteride 5 mg oral tablet: Last Dose Taken:  , 1 tab(s) orally once a day  · 	Flonase 50 mcg/inh nasal spray: Last Dose Taken:  , 1 spray(s) nasal once a day  · 	acebutolol 200 mg oral capsule: Last Dose Taken:  , 1 cap(s) orally 2 times a day  · 	Zetia 10 mg oral tablet: Last Dose Taken:  , 1 tab(s) orally once a day  · 	Actos 15 mg oral tablet: Last Dose Taken:  , 1 tab(s) orally once a day  · 	allopurinol 300 mg oral tablet: Last Dose Taken:  , 1 tab(s) orally once a day  · 	folic acid 1 mg oral tablet: Last Dose Taken:  , 1 tab(s) orally once a day  · 	Metamucil: Last Dose Taken:  , 1 cap(s) orally once a day  · 	Singulair 10 mg oral tablet: Last Dose Taken:  , 1 tab(s) orally once a day (in the evening)  · 	cholecalciferol 2000 intl units oral capsule: Last Dose Taken:  , 1 cap(s) orally once a day  · 	Librax 5 mg-2.5 mg oral capsule: Last Dose Taken:  , 1 cap(s) orally once a day (at bedtime)  · 	Rozerem 8 mg oral tablet: Last Dose Taken:  , 1 tab(s) orally once a day (at bedtime)  · 	MiraLax: Last Dose Taken:  , 1 packet(s) orally once a day    Hospital Medications:  acetaminophen   Tablet .. 650 milliGRAM(s) Oral every 6 hours PRN  heparin  Injectable 5000 Unit(s) SubCutaneous every 8 hours  lactated ringers. 1000 milliLiter(s) IV Continuous <Continuous>  morphine  - Injectable 2 milliGRAM(s) IV Push every 4 hours PRN  piperacillin/tazobactam IVPB.. 3.375 Gram(s) IV Intermittent every 8 hours      PMHX/PSHX:  Restless leg syndrome  Seasonal allergies  BPH (Benign Prostatic Hyperplasia)  Cholelithiasis  Type 2 diabetes mellitus  Diverticulitis  IBS (irritable bowel syndrome)  Darier's disease  MVP (mitral valve prolapse)  PVC's (premature ventricular contractions)  Hyperlipidemia  HTN (hypertension)  Hx of left hemicolectomy  Anal fistula  S/P hernia repair  S/P TURP  S/P tonsillectomy      Family history:      Social History:   Denies smoking or alcohol use. Lives with his wife    ROS:     General:  No wt loss, fevers, chills, night sweats, fatigue,   Eyes:  Good vision, no reported pain  ENT:  No sore throat, pain, runny nose, dysphagia  CV:  No pain, palpitations, hypo/hypertension  Resp:  No dyspnea, cough, tachypnea, wheezing  GI:  See HPI  :  No pain, bleeding, incontinence, nocturia  Muscle:  No pain, weakness  Neuro:  No weakness, tingling, memory problems  Psych:  No fatigue, insomnia, mood problems, depression  Endocrine:  No polyuria, polydipsia, cold/heat intolerance  Heme:  No petechiae, ecchymosis, easy bruisability  Skin:  No rash, edema      PHYSICAL EXAM:     GENERAL:  Appears stated age, well-groomed, well-nourished, no distress  HEENT:  NC/AT,  conjunctivae clear and pink,  no JVD  CHEST:  Full & symmetric excursion, no increased effort, breath sounds clear  HEART:  Regular rhythm, S1, S2, no murmur/rub/S3/S4, no abdominal bruit, no edema  ABDOMEN:  Soft, non-tender, non-distended, normoactive bowel sounds,  no masses ,  EXTREMITIES:  no cyanosis,clubbing or edema  SKIN:  No rash/erythema/ecchymoses/petechiae/wounds/abscess/warm/dry  NEURO:  Alert, oriented    Vital Signs:  Vital Signs Last 24 Hrs  T(C): 37.3 (30 Jun 2019 05:03), Max: 38.1 (29 Jun 2019 16:39)  T(F): 99.2 (30 Jun 2019 05:03), Max: 100.5 (29 Jun 2019 16:39)  HR: 97 (30 Jun 2019 05:03) (84 - 99)  BP: 130/70 (30 Jun 2019 05:03) (103/59 - 131/64)  BP(mean): --  RR: 18 (30 Jun 2019 05:03) (18 - 20)  SpO2: 93% (30 Jun 2019 05:03) (91% - 96%)  Daily Height in cm: 157.48 (29 Jun 2019 14:29)    Daily     LABS:                        15.3   34.3  )-----------( 676      ( 29 Jun 2019 15:33 )             47.4     06-30    136  |  98  |  41<H>  ----------------------------<  72  4.3   |  19<L>  |  1.96<H>    Ca    8.3<L>      30 Jun 2019 06:39  Phos  2.7     06-30  Mg     1.9     06-30    TPro  5.5<L>  /  Alb  3.2<L>  /  TBili  1.0  /  DBili  0.4<H>  /  AST  49<H>  /  ALT  119<H>  /  AlkPhos  112  06-30    LIVER FUNCTIONS - ( 30 Jun 2019 06:39 )  Alb: 3.2 g/dL / Pro: 5.5 g/dL / ALK PHOS: 112 U/L / ALT: 119 U/L / AST: 49 U/L / GGT: x               Amylase Serum--      Lipase serum31       Ammonia--      Imaging:    < from: CT Abdomen and Pelvis No Cont (06.29.19 @ 17:53) >  EXAM:  CT ABDOMEN AND PELVIS                        EXAM:  CT CHEST                        PROCEDURE DATE:  06/29/2019    INTERPRETATION:  CLINICAL INFORMATION: Abdominal pain.  Severe leukocytosis.  Rule out pneumonia.    COMPARISON: CT abdomen/pelvis 12/22/2014.  PROCEDURE:   CT of the Chest, Abdomen and Pelvis was performed without intravenous contrast.   Intravenous contrast: None.  Oral contrast: None.  Sagittal and coronal reformats were performed.    FINDINGS:    CHEST:     LUNGS AND LARGE AIRWAYS: Patent central airways. There is bibasilar linear subsegmental atelectasis versus scarring.  There are scattered calcified granulomas in both upper lobes and both lower lobes.  PLEURA: No pleural effusion.  VESSELS:Mild atherosclerotic calcification of the thoracic aorta and arch vessels.  HEART: Normal heart size.  No pericardial effusion.  Moderate atherosclerotic calcification the coronary arteries.  Mild calcification aortic valve leaflets.  MEDIASTINUM AND DAKOTAH: No gross lymphadenopathy; however lymph nodes are suboptimally assessed without contrast.  CHEST WALL AND LOWER NECK: Mildly heterogeneous thyroid without discrete nodule warranting follow-up.  Mild bilateral gynecomastia.    ABDOMEN AND PELVIS:    LIVER: Within normal limits.  BILE DUCTS: There are multiple gallstones in the distal common bile duct.  GALLBLADDER: Dilated thick-walled gallbladder with marked surrounding inflammatory change.  Multiple gallstones are seen in the gallbladder fundus, gallbladder neck and cystic duct.  SPLEEN: Within normal limits.  PANCREAS: Within normal limits.  ADRENALS: Within normal limits.  KIDNEYS/URETERS: Left renal cysts measure up to 5.5 cm in the lower pole and 3.5 cm in the upperpole.  Right renal cysts measure up to 3 cm.  Subcentimeter renal lesions bilaterally are too small to characterize by   CT scan.    BLADDER: Within normal limits.  REPRODUCTIVE ORGANS: The prostate measures approximately 3.9 x 4.6 x 4.5 cm and contains a TURP defect.    BOWEL: The patient is status post partial resection of the distal sigmoid colon.  No bowel obstruction.  Normal appendix.  Large amount of stool in the colon..  PERITONEUM: No ascites.  VESSELS:  Atherosclerotic changes.  RETROPERITONEUM: No lymphadenopathy.    ABDOMINAL WALL: Within normal limits.  BONES: Degenerative changes in the spine.  Status post right total hip arthroplasty.    IMPRESSION:  CHEST: No evidence of pneumonia.    ABDOMEN/PELVIS:  Acute cholecystitis and choledocholithiasis.  Constipation.      < end of copied text > Chief Complaint:  Patient is a 83y old  Male who presents with a chief complaint of Cholecystitis (30 Jun 2019 05:27)      HPI:  83 year old male with HTN, DM, BHP, HLD, diverticulitis s/p LAR (2012, Dr. Alberts), incisional hernia s/p repair with mesh (2014, Dr. Hutchinson), presenting with 3 days of RUQ pain, nausea, and emesis. He has never had pain like this in the past. He has eaten very little since the pain began. He denies constipation, diarrhea, or shortness of breath.     In the ED he is febrile to 38.1  WBC count is 34.3, bilirubin 1.1, alk phos 141, AST//188, lactate 1.4, and imaging shows cholecystitis with stones in the CBD and thus GI was cpnsulted.     Allergies:  Compazine (Other)  fish (Anaphylaxis)  iodine containing compounds (Rash; Short breath)  sulfa drugs (Hives)  zolpidem (Other)      Home Medications:   Patient Currently Takes Medications as of 29-Jun-2019 21:46 documented in Structured Notes  · 	aspirin 81 mg oral tablet: Last Dose Taken:  , 1 tab(s) orally once a day  · 	finasteride 5 mg oral tablet: Last Dose Taken:  , 1 tab(s) orally once a day  · 	Flonase 50 mcg/inh nasal spray: Last Dose Taken:  , 1 spray(s) nasal once a day  · 	acebutolol 200 mg oral capsule: Last Dose Taken:  , 1 cap(s) orally 2 times a day  · 	Zetia 10 mg oral tablet: Last Dose Taken:  , 1 tab(s) orally once a day  · 	Actos 15 mg oral tablet: Last Dose Taken:  , 1 tab(s) orally once a day  · 	allopurinol 300 mg oral tablet: Last Dose Taken:  , 1 tab(s) orally once a day  · 	folic acid 1 mg oral tablet: Last Dose Taken:  , 1 tab(s) orally once a day  · 	Metamucil: Last Dose Taken:  , 1 cap(s) orally once a day  · 	Singulair 10 mg oral tablet: Last Dose Taken:  , 1 tab(s) orally once a day (in the evening)  · 	cholecalciferol 2000 intl units oral capsule: Last Dose Taken:  , 1 cap(s) orally once a day  · 	Librax 5 mg-2.5 mg oral capsule: Last Dose Taken:  , 1 cap(s) orally once a day (at bedtime)  · 	Rozerem 8 mg oral tablet: Last Dose Taken:  , 1 tab(s) orally once a day (at bedtime)  · 	MiraLax: Last Dose Taken:  , 1 packet(s) orally once a day    Hospital Medications:  acetaminophen   Tablet .. 650 milliGRAM(s) Oral every 6 hours PRN  heparin  Injectable 5000 Unit(s) SubCutaneous every 8 hours  lactated ringers. 1000 milliLiter(s) IV Continuous <Continuous>  morphine  - Injectable 2 milliGRAM(s) IV Push every 4 hours PRN  piperacillin/tazobactam IVPB.. 3.375 Gram(s) IV Intermittent every 8 hours      PMHX/PSHX:  Restless leg syndrome  Seasonal allergies  BPH (Benign Prostatic Hyperplasia)  Cholelithiasis  Type 2 diabetes mellitus  Diverticulitis  IBS (irritable bowel syndrome)  Darier's disease  MVP (mitral valve prolapse)  PVC's (premature ventricular contractions)  Hyperlipidemia  HTN (hypertension)  Hx of left hemicolectomy  Anal fistula  S/P hernia repair  S/P TURP  S/P tonsillectomy      Family history:      Social History:   Denies smoking or alcohol use. Lives with his wife    ROS:     General:  No wt loss, fevers, chills, night sweats, fatigue,   Eyes:  Good vision, no reported pain  ENT:  No sore throat, pain, runny nose, dysphagia  CV:  No pain, palpitations, hypo/hypertension  Resp:  No dyspnea, cough, tachypnea, wheezing  GI:  See HPI  :  No pain, bleeding, incontinence, nocturia  Muscle:  No pain, weakness  Neuro:  No weakness, tingling, memory problems  Psych:  No fatigue, insomnia, mood problems, depression  Endocrine:  No polyuria, polydipsia, cold/heat intolerance  Heme:  No petechiae, ecchymosis, easy bruisability  Skin:  No rash, edema      PHYSICAL EXAM:     GENERAL:  Appears stated age, well-groomed, well-nourished, no distress  HEENT:  NC/AT,  conjunctivae clear and pink,  no JVD  CHEST:  Full & symmetric excursion, no increased effort, breath sounds clear  HEART:  Regular rhythm, S1, S2, no murmur/rub/S3/S4, no abdominal bruit, no edema  ABDOMEN:  Soft, non-tender, non-distended, normoactive bowel sounds,  no masses ,  EXTREMITIES:  no cyanosis,clubbing or edema  SKIN:  No rash/erythema/ecchymoses/petechiae/wounds/abscess/warm/dry  NEURO:  Alert, oriented    Vital Signs:  Vital Signs Last 24 Hrs  T(C): 37.3 (30 Jun 2019 05:03), Max: 38.1 (29 Jun 2019 16:39)  T(F): 99.2 (30 Jun 2019 05:03), Max: 100.5 (29 Jun 2019 16:39)  HR: 97 (30 Jun 2019 05:03) (84 - 99)  BP: 130/70 (30 Jun 2019 05:03) (103/59 - 131/64)  BP(mean): --  RR: 18 (30 Jun 2019 05:03) (18 - 20)  SpO2: 93% (30 Jun 2019 05:03) (91% - 96%)  Daily Height in cm: 157.48 (29 Jun 2019 14:29)    Daily     LABS:                        15.3   34.3  )-----------( 676      ( 29 Jun 2019 15:33 )             47.4     06-30    136  |  98  |  41<H>  ----------------------------<  72  4.3   |  19<L>  |  1.96<H>    Ca    8.3<L>      30 Jun 2019 06:39  Phos  2.7     06-30  Mg     1.9     06-30    TPro  5.5<L>  /  Alb  3.2<L>  /  TBili  1.0  /  DBili  0.4<H>  /  AST  49<H>  /  ALT  119<H>  /  AlkPhos  112  06-30    LIVER FUNCTIONS - ( 30 Jun 2019 06:39 )  Alb: 3.2 g/dL / Pro: 5.5 g/dL / ALK PHOS: 112 U/L / ALT: 119 U/L / AST: 49 U/L / GGT: x               Amylase Serum--      Lipase serum31       Ammonia--      Imaging:    < from: CT Abdomen and Pelvis No Cont (06.29.19 @ 17:53) >  EXAM:  CT ABDOMEN AND PELVIS                        EXAM:  CT CHEST                        PROCEDURE DATE:  06/29/2019    INTERPRETATION:  CLINICAL INFORMATION: Abdominal pain.  Severe leukocytosis.  Rule out pneumonia.    COMPARISON: CT abdomen/pelvis 12/22/2014.  PROCEDURE:   CT of the Chest, Abdomen and Pelvis was performed without intravenous contrast.   Intravenous contrast: None.  Oral contrast: None.  Sagittal and coronal reformats were performed.    FINDINGS:    CHEST:     LUNGS AND LARGE AIRWAYS: Patent central airways. There is bibasilar linear subsegmental atelectasis versus scarring.  There are scattered calcified granulomas in both upper lobes and both lower lobes.  PLEURA: No pleural effusion.  VESSELS:Mild atherosclerotic calcification of the thoracic aorta and arch vessels.  HEART: Normal heart size.  No pericardial effusion.  Moderate atherosclerotic calcification the coronary arteries.  Mild calcification aortic valve leaflets.  MEDIASTINUM AND DAKOTAH: No gross lymphadenopathy; however lymph nodes are suboptimally assessed without contrast.  CHEST WALL AND LOWER NECK: Mildly heterogeneous thyroid without discrete nodule warranting follow-up.  Mild bilateral gynecomastia.    ABDOMEN AND PELVIS:    LIVER: Within normal limits.  BILE DUCTS: There are multiple gallstones in the distal common bile duct.  GALLBLADDER: Dilated thick-walled gallbladder with marked surrounding inflammatory change.  Multiple gallstones are seen in the gallbladder fundus, gallbladder neck and cystic duct.  SPLEEN: Within normal limits.  PANCREAS: Within normal limits.  ADRENALS: Within normal limits.  KIDNEYS/URETERS: Left renal cysts measure up to 5.5 cm in the lower pole and 3.5 cm in the upperpole.  Right renal cysts measure up to 3 cm.  Subcentimeter renal lesions bilaterally are too small to characterize by   CT scan.    BLADDER: Within normal limits.  REPRODUCTIVE ORGANS: The prostate measures approximately 3.9 x 4.6 x 4.5 cm and contains a TURP defect.    BOWEL: The patient is status post partial resection of the distal sigmoid colon.  No bowel obstruction.  Normal appendix.  Large amount of stool in the colon..  PERITONEUM: No ascites.  VESSELS:  Atherosclerotic changes.  RETROPERITONEUM: No lymphadenopathy.    ABDOMINAL WALL: Within normal limits.  BONES: Degenerative changes in the spine.  Status post right total hip arthroplasty.    IMPRESSION:  CHEST: No evidence of pneumonia.    ABDOMEN/PELVIS:  Acute cholecystitis and choledocholithiasis.  Constipation.      < end of copied text >

## 2019-06-30 NOTE — CONSULT NOTE ADULT - ASSESSMENT
83 year old male with HTN, DM, BHP, HLD, diverticulitis s/p LAR (2012, Dr. Alberts), incisional hernia s/p repair with mesh (2014, Dr. Hutchinson), presenting with 3 days of RUQ pain, nausea, and emesis    IMPRESSION  - Abdominal pain with elevated liver enzymes (Tbili 1.0,  AST 49, , Alk phos 112), CT with choledocholithiasis    RECOMMENDATION    - trend CMP, INR  - plan for ERCP tomorrow, please keep NPO after midnight   - continue IVF and antibiotics   - supportive care as per primary team      Yun Aguilar, PGY-6  GI fellow  B- [id number]/ [phone]  Please call GI fellow on call after 5pm and on weekends 83 year old male with HTN, DM, BHP, HLD, diverticulitis s/p LAR (2012, Dr. Alberts), incisional hernia s/p repair with mesh (2014, Dr. Hutchinson), presenting with 3 days of RUQ pain, nausea, and emesis    IMPRESSION  - Abdominal pain with elevated liver enzymes (Tbili 1.0,  AST 49, , Alk phos 112), CT with choledocholithiasis  Afebrile, WBC 34    RECOMMENDATION    - trend CMP, INR  - plan for ERCP tomorrow, please keep NPO after midnight   - continue IVF and antibiotics   - supportive care as per primary team    Yun Aguilar, PGY-6  GI fellow  B- [phone]/ [phone]  Please call GI fellow on call after 5pm and on weekends

## 2019-06-30 NOTE — CONSULT NOTE ADULT - ATTENDING COMMENTS
Admitted with acute cholecystitis and choledocholithiasis. Plan for percutaneous cholecystostomy today. Continue to monitor serial liver enzymes. Continue current antibiotics. Plan for ERCP.

## 2019-07-01 LAB
ALBUMIN SERPL ELPH-MCNC: 2.7 G/DL — LOW (ref 3.3–5)
ALP SERPL-CCNC: 86 U/L — SIGNIFICANT CHANGE UP (ref 40–120)
ALT FLD-CCNC: 69 U/L — HIGH (ref 10–45)
ANION GAP SERPL CALC-SCNC: 13 MMOL/L — SIGNIFICANT CHANGE UP (ref 5–17)
APTT BLD: 31.3 SEC — SIGNIFICANT CHANGE UP (ref 27.5–36.3)
AST SERPL-CCNC: 21 U/L — SIGNIFICANT CHANGE UP (ref 10–40)
BILIRUB DIRECT SERPL-MCNC: 0.3 MG/DL — HIGH (ref 0–0.2)
BILIRUB INDIRECT FLD-MCNC: 0.5 MG/DL — SIGNIFICANT CHANGE UP (ref 0.2–1)
BILIRUB SERPL-MCNC: 0.8 MG/DL — SIGNIFICANT CHANGE UP (ref 0.2–1.2)
BLD GP AB SCN SERPL QL: NEGATIVE — SIGNIFICANT CHANGE UP
BUN SERPL-MCNC: 34 MG/DL — HIGH (ref 7–23)
CALCIUM SERPL-MCNC: 8 MG/DL — LOW (ref 8.4–10.5)
CHLORIDE SERPL-SCNC: 100 MMOL/L — SIGNIFICANT CHANGE UP (ref 96–108)
CO2 SERPL-SCNC: 22 MMOL/L — SIGNIFICANT CHANGE UP (ref 22–31)
CREAT SERPL-MCNC: 1.64 MG/DL — HIGH (ref 0.5–1.3)
GLUCOSE BLDC GLUCOMTR-MCNC: 101 MG/DL — HIGH (ref 70–99)
GLUCOSE BLDC GLUCOMTR-MCNC: 102 MG/DL — HIGH (ref 70–99)
GLUCOSE BLDC GLUCOMTR-MCNC: 137 MG/DL — HIGH (ref 70–99)
GLUCOSE BLDC GLUCOMTR-MCNC: 81 MG/DL — SIGNIFICANT CHANGE UP (ref 70–99)
GLUCOSE SERPL-MCNC: 79 MG/DL — SIGNIFICANT CHANGE UP (ref 70–99)
HCT VFR BLD CALC: 42.1 % — SIGNIFICANT CHANGE UP (ref 39–50)
HGB BLD-MCNC: 13.3 G/DL — SIGNIFICANT CHANGE UP (ref 13–17)
INR BLD: 1.23 RATIO — HIGH (ref 0.88–1.16)
MAGNESIUM SERPL-MCNC: 2 MG/DL — SIGNIFICANT CHANGE UP (ref 1.6–2.6)
MCHC RBC-ENTMCNC: 24.8 PG — LOW (ref 27–34)
MCHC RBC-ENTMCNC: 31.6 GM/DL — LOW (ref 32–36)
MCV RBC AUTO: 78.4 FL — LOW (ref 80–100)
PHOSPHATE SERPL-MCNC: 2.5 MG/DL — SIGNIFICANT CHANGE UP (ref 2.5–4.5)
PLATELET # BLD AUTO: 549 K/UL — HIGH (ref 150–400)
POTASSIUM SERPL-MCNC: 3.9 MMOL/L — SIGNIFICANT CHANGE UP (ref 3.5–5.3)
POTASSIUM SERPL-SCNC: 3.9 MMOL/L — SIGNIFICANT CHANGE UP (ref 3.5–5.3)
PROT SERPL-MCNC: 5.3 G/DL — LOW (ref 6–8.3)
PROTHROM AB SERPL-ACNC: 14 SEC — HIGH (ref 10–13.1)
RBC # BLD: 5.37 M/UL — SIGNIFICANT CHANGE UP (ref 4.2–5.8)
RBC # FLD: 17.5 % — HIGH (ref 10.3–14.5)
RH IG SCN BLD-IMP: NEGATIVE — SIGNIFICANT CHANGE UP
SODIUM SERPL-SCNC: 135 MMOL/L — SIGNIFICANT CHANGE UP (ref 135–145)
WBC # BLD: 16.37 K/UL — HIGH (ref 3.8–10.5)
WBC # FLD AUTO: 16.37 K/UL — HIGH (ref 3.8–10.5)

## 2019-07-01 PROCEDURE — 99232 SBSQ HOSP IP/OBS MODERATE 35: CPT | Mod: GC

## 2019-07-01 RX ORDER — INSULIN LISPRO 100/ML
VIAL (ML) SUBCUTANEOUS EVERY 6 HOURS
Refills: 0 | Status: DISCONTINUED | OUTPATIENT
Start: 2019-07-01 | End: 2019-07-02

## 2019-07-01 RX ORDER — FINASTERIDE 5 MG/1
5 TABLET, FILM COATED ORAL DAILY
Refills: 0 | Status: DISCONTINUED | OUTPATIENT
Start: 2019-07-01 | End: 2019-07-03

## 2019-07-01 RX ORDER — DEXTROSE 50 % IN WATER 50 %
15 SYRINGE (ML) INTRAVENOUS ONCE
Refills: 0 | Status: DISCONTINUED | OUTPATIENT
Start: 2019-07-01 | End: 2019-07-03

## 2019-07-01 RX ORDER — GLUCAGON INJECTION, SOLUTION 0.5 MG/.1ML
1 INJECTION, SOLUTION SUBCUTANEOUS ONCE
Refills: 0 | Status: DISCONTINUED | OUTPATIENT
Start: 2019-07-01 | End: 2019-07-03

## 2019-07-01 RX ORDER — DEXTROSE 50 % IN WATER 50 %
25 SYRINGE (ML) INTRAVENOUS ONCE
Refills: 0 | Status: DISCONTINUED | OUTPATIENT
Start: 2019-07-01 | End: 2019-07-03

## 2019-07-01 RX ORDER — CHOLECALCIFEROL (VITAMIN D3) 125 MCG
1000 CAPSULE ORAL
Refills: 0 | Status: DISCONTINUED | OUTPATIENT
Start: 2019-07-02 | End: 2019-07-03

## 2019-07-01 RX ORDER — DEXTROSE 50 % IN WATER 50 %
12.5 SYRINGE (ML) INTRAVENOUS ONCE
Refills: 0 | Status: DISCONTINUED | OUTPATIENT
Start: 2019-07-01 | End: 2019-07-03

## 2019-07-01 RX ORDER — ALLOPURINOL 300 MG
300 TABLET ORAL AT BEDTIME
Refills: 0 | Status: DISCONTINUED | OUTPATIENT
Start: 2019-07-01 | End: 2019-07-03

## 2019-07-01 RX ORDER — CARVEDILOL PHOSPHATE 80 MG/1
12.5 CAPSULE, EXTENDED RELEASE ORAL EVERY 12 HOURS
Refills: 0 | Status: DISCONTINUED | OUTPATIENT
Start: 2019-07-01 | End: 2019-07-03

## 2019-07-01 RX ORDER — FOLIC ACID 0.8 MG
1 TABLET ORAL DAILY
Refills: 0 | Status: DISCONTINUED | OUTPATIENT
Start: 2019-07-02 | End: 2019-07-03

## 2019-07-01 RX ORDER — DEXTROSE MONOHYDRATE, SODIUM CHLORIDE, AND POTASSIUM CHLORIDE 50; .745; 4.5 G/1000ML; G/1000ML; G/1000ML
1000 INJECTION, SOLUTION INTRAVENOUS
Refills: 0 | Status: DISCONTINUED | OUTPATIENT
Start: 2019-07-01 | End: 2019-07-02

## 2019-07-01 RX ORDER — DEXTROSE MONOHYDRATE, SODIUM CHLORIDE, AND POTASSIUM CHLORIDE 50; .745; 4.5 G/1000ML; G/1000ML; G/1000ML
1000 INJECTION, SOLUTION INTRAVENOUS
Refills: 0 | Status: DISCONTINUED | OUTPATIENT
Start: 2019-07-01 | End: 2019-07-01

## 2019-07-01 RX ADMIN — PIPERACILLIN AND TAZOBACTAM 25 GRAM(S): 4; .5 INJECTION, POWDER, LYOPHILIZED, FOR SOLUTION INTRAVENOUS at 05:54

## 2019-07-01 RX ADMIN — PIPERACILLIN AND TAZOBACTAM 25 GRAM(S): 4; .5 INJECTION, POWDER, LYOPHILIZED, FOR SOLUTION INTRAVENOUS at 13:15

## 2019-07-01 RX ADMIN — CARVEDILOL PHOSPHATE 12.5 MILLIGRAM(S): 80 CAPSULE, EXTENDED RELEASE ORAL at 05:53

## 2019-07-01 RX ADMIN — Medication 300 MILLIGRAM(S): at 22:27

## 2019-07-01 RX ADMIN — FINASTERIDE 5 MILLIGRAM(S): 5 TABLET, FILM COATED ORAL at 12:50

## 2019-07-01 RX ADMIN — HEPARIN SODIUM 5000 UNIT(S): 5000 INJECTION INTRAVENOUS; SUBCUTANEOUS at 22:27

## 2019-07-01 RX ADMIN — CARVEDILOL PHOSPHATE 12.5 MILLIGRAM(S): 80 CAPSULE, EXTENDED RELEASE ORAL at 17:22

## 2019-07-01 RX ADMIN — MONTELUKAST 10 MILLIGRAM(S): 4 TABLET, CHEWABLE ORAL at 12:50

## 2019-07-01 RX ADMIN — HEPARIN SODIUM 5000 UNIT(S): 5000 INJECTION INTRAVENOUS; SUBCUTANEOUS at 05:54

## 2019-07-01 RX ADMIN — PIPERACILLIN AND TAZOBACTAM 25 GRAM(S): 4; .5 INJECTION, POWDER, LYOPHILIZED, FOR SOLUTION INTRAVENOUS at 22:27

## 2019-07-01 RX ADMIN — HEPARIN SODIUM 5000 UNIT(S): 5000 INJECTION INTRAVENOUS; SUBCUTANEOUS at 13:19

## 2019-07-01 NOTE — PROGRESS NOTE ADULT - SUBJECTIVE AND OBJECTIVE BOX
Interval Events:    S:  Patient examined at bedside. BAUDILIO.    O: Vital Signs  T(C): 36.9 (07-01 @ 09:57), Max: 38.6 (06-30 @ 17:00)  HR: 63 (07-01 @ 09:57) (63 - 108)  BP: 133/71 (07-01 @ 09:57) (104/67 - 159/76)  RR: 18 (07-01 @ 09:57) (16 - 20)  SpO2: 95% (07-01 @ 09:57) (92% - 100%)  06-30-19 @ 07:01  -  07-01-19 @ 07:00  --------------------------------------------------------  IN: 2475 mL / OUT: 1030 mL / NET: 1445 mL    07-01-19 @ 07:01  -  07-01-19 @ 11:40  --------------------------------------------------------  IN: 0 mL / OUT: 20 mL / NET: -20 mL      General: NAD, well-nourished  HEENT: Atraumatic, EOMI  Resp: Breathing comfortably on RA  CV: Normal sinus rhythm  Abd: Soft, moderately tender in RUQ, mildly distended  Ext: ROMIx4, motor strength intact x 4                          13.3   16.37 )-----------( 549      ( 01 Jul 2019 09:40 )             42.1   07-01    135  |  100  |  34<H>  ----------------------------<  79  3.9   |  22  |  1.64<H>    Ca    8.0<L>      01 Jul 2019 07:04  Phos  2.5     07-01  Mg     2.0     07-01    TPro  5.3<L>  /  Alb  2.7<L>  /  TBili  0.8  /  DBili  0.3<H>  /  AST  21  /  ALT  69<H>  /  AlkPhos  86  07-01

## 2019-07-01 NOTE — PROVIDER CONTACT NOTE (CRITICAL VALUE NOTIFICATION) - TEST AND RESULT REPORTED:
abnormal body fluid culture 6/30 moderate polymorphonuclear leukocytes per low power field, few gram negative rods per oil power field, few gram positive cocci in pairs per oil power field

## 2019-07-01 NOTE — PROGRESS NOTE ADULT - SUBJECTIVE AND OBJECTIVE BOX
Interventional Radiology Follow- Up Note    This is a 83y Male s/p percutaneous cholecystostomy on 6/30 in Interventional Radiology with Dr. Raymundo.     Patient seen and examined @ bedside. Reports abdominal pain improving, now he is only experience pain at drain site. Denies nausea, vomiting. Has not had oral intake as of now.     PAST MEDICAL & SURGICAL HISTORY:  Restless leg syndrome  Seasonal allergies  BPH (Benign Prostatic Hyperplasia)  Cholelithiasis  Type 2 diabetes mellitus  Diverticulitis  IBS (irritable bowel syndrome)  Darier's disease  MVP (mitral valve prolapse)  PVC's (premature ventricular contractions)  Hyperlipidemia  HTN (hypertension)  Hx of left hemicolectomy  Anal fistula: s/p surgery  S/P hernia repair: bilateral  S/P TURP: x 2  S/P tonsillectomy    Allergies: Compazine (Other)  fish (Anaphylaxis)  iodine containing compounds (Rash; Short breath)  sulfa drugs (Hives)  zolpidem (Other)    LABS:                        13.3   16.37 )-----------( 549      ( 01 Jul 2019 09:40 )             42.1     07-01    135  |  100  |  34<H>  ----------------------------<  79  3.9   |  22  |  1.64<H>    Ca    8.0<L>      01 Jul 2019 07:04  Phos  2.5     07-01  Mg     2.0     07-01    TPro  5.3<L>  /  Alb  2.7<L>  /  TBili  0.8  /  DBili  0.3<H>  /  AST  21  /  ALT  69<H>  /  AlkPhos  86  07-01    PT/INR - ( 01 Jul 2019 09:38 )   PT: 14.0 sec;   INR: 1.23 ratio         PTT - ( 01 Jul 2019 09:38 )  PTT:31.3 sec    I&O's Detail    30 Jun 2019 07:01  -  01 Jul 2019 07:00  --------------------------------------------------------  IN:    lactated ringers.: 2125 mL    Solution: 350 mL  Total IN: 2475 mL    OUT:    Drain: 180 mL    Voided: 850 mL  Total OUT: 1030 mL    Total NET: 1445 mL      01 Jul 2019 07:01  -  01 Jul 2019 13:02  --------------------------------------------------------  IN:  Total IN: 0 mL    OUT:    Drain: 20 mL  Total OUT: 20 mL    Total NET: -20 mL    Bile Cultures 6/30: Gram neg rods and gram positive cocci. Final culture pending.     Vitals: T(F): 98.5 (07-01-19 @ 09:57), Max: 101.5 (06-30-19 @ 17:00)  HR: 63 (07-01-19 @ 09:57) (63 - 108)  BP: 133/71 (07-01-19 @ 09:57) (104/67 - 159/76)  RR: 18 (07-01-19 @ 09:57) (16 - 20)  SpO2: 95% (07-01-19 @ 09:57) (92% - 100%)    PHYSICAL EXAM:  General: Nontoxic, in NAD, A&O x3  Abd: ND, soft, Mild TTP in RUQ. Vanessa tube intact to drainage bag with yellow bile, 180cc/24hr. Drain flushed with 5cc NS w/o difficulty, +fluid return noted in tubing. Dressing c/d/i.     A/P: 83y Male with PMH of HTN, DM, BHP, HLD, diverticulitis s/p LAR (2012), incisional hernia s/p repair with mesh (2014, Dr. Hutchinson) who was admitted with acute cholecystitis, choledocholithiasis s/p percutaneous cholecystostomy in IR on 6/30/19 with Dr. Raymundo.   -WBC improving, LFT's downtrending  -Per GI note pt planned for ERCP tomorrow  -Abx therapy per primary team  -continue to monitor output    -flush drain per doctor orders  -trend vitals, labs  -will discuss with IR attending  -Please call IR at extension [phone] with any questions, concerns, or issues regarding above.

## 2019-07-01 NOTE — PROGRESS NOTE ADULT - ASSESSMENT
83M with hx of HTN, DM, BHP, HLD, diverticulitis s/p LAR (2012, Dr. Alberts), incisional hernia s/p repair with mesh (2014, Dr. Hutchinson), presenting with acute cholecystitis and choledocholithiasis.    - NPO, IV fluids  - Antibiotics (zosyn)  - ERCP postponed until tomorrow due to full schedule  - DVT ppx    Green team surgery  Pager [phone]

## 2019-07-01 NOTE — PROGRESS NOTE ADULT - SUBJECTIVE AND OBJECTIVE BOX
Chief Complaint:  Patient is a 83y old  Male who presents with a chief complaint of Cholecystitis (30 Jun 2019 13:46)      Interval Events:   - patient underwent IR guided PTC tube placement yesterday.    HPI: from consult note:   83 year old male with HTN, DM, BHP, HLD, diverticulitis s/p LAR (2012, Dr. Alberts), incisional hernia s/p repair with mesh (2014, Dr. Hutchinson), presenting with 3 days of RUQ pain, nausea, and emesis. He has never had pain like this in the past. He has eaten very little since the pain began. He denies constipation, diarrhea, or shortness of breath.     In the ED he is febrile to 38.1  WBC count is 34.3, bilirubin 1.1, alk phos 141, AST//188, lactate 1.4, and imaging shows cholecystitis with stones in the CBD and thus GI was cpnsulted    Allergies:  Compazine (Other)  fish (Anaphylaxis)  iodine containing compounds (Rash; Short breath)  sulfa drugs (Hives)  zolpidem (Other)      Hospital Medications:  acetaminophen   Tablet .. 650 milliGRAM(s) Oral every 6 hours PRN  allopurinol 300 milliGRAM(s) Oral at bedtime  carvedilol 12.5 milliGRAM(s) Oral every 12 hours  dextrose 40% Gel 15 Gram(s) Oral once PRN  dextrose 50% Injectable 12.5 Gram(s) IV Push once  dextrose 50% Injectable 25 Gram(s) IV Push once  dextrose 50% Injectable 25 Gram(s) IV Push once  docusate sodium 100 milliGRAM(s) Oral two times a day  finasteride 5 milliGRAM(s) Oral daily  glucagon  Injectable 1 milliGRAM(s) IntraMuscular once PRN  heparin  Injectable 5000 Unit(s) SubCutaneous every 8 hours  insulin lispro (HumaLOG) corrective regimen sliding scale   SubCutaneous every 6 hours  lactated ringers. 1000 milliLiter(s) IV Continuous <Continuous>  montelukast 10 milliGRAM(s) Oral daily  morphine  - Injectable 2 milliGRAM(s) IV Push every 4 hours PRN  piperacillin/tazobactam IVPB.. 3.375 Gram(s) IV Intermittent every 8 hours  polyethylene glycol 3350 17 Gram(s) Oral daily  senna 2 Tablet(s) Oral at bedtime      PMHX/PSHX:  Restless leg syndrome  Seasonal allergies  BPH (Benign Prostatic Hyperplasia)  Cholelithiasis  Type 2 diabetes mellitus  Diverticulitis  IBS (irritable bowel syndrome)  Darier's disease  MVP (mitral valve prolapse)  PVC's (premature ventricular contractions)  Hyperlipidemia  HTN (hypertension)  Hx of left hemicolectomy  Anal fistula  S/P hernia repair  S/P TURP  S/P tonsillectomy      Family history:      ROS:     General:  No wt loss, fevers, chills, night sweats, fatigue,   Eyes:  Good vision, no reported pain  ENT:  No sore throat, pain, runny nose, dysphagia  CV:  No pain, palpitations, hypo/hypertension  Resp:  No dyspnea, cough, tachypnea, wheezing  GI:  See HPI  :  No pain, bleeding, incontinence, nocturia  Muscle:  No pain, weakness  Neuro:  No weakness, tingling, memory problems  Psych:  No fatigue, insomnia, mood problems, depression  Endocrine:  No polyuria, polydipsia, cold/heat intolerance  Heme:  No petechiae, ecchymosis, easy bruisability  Skin:  No rash, edema      PHYSICAL EXAM:     GENERAL:  Appears stated age, well-groomed, well-nourished, no distress  HEENT:  NC/AT,  conjunctivae clear, sclera -anicteric  CHEST:  Full & symmetric excursion, no increased effort, breath sounds clear  HEART:  Regular rhythm, S1, S2, no murmur/rub/S3/S4,  no edema  ABDOMEN:  Soft, non-tender, non-distended, normoactive bowel sounds,  no masses ,no hepato-splenomegaly,   EXTREMITIES:  no cyanosis,clubbing or edema  SKIN:  No rash/erythema/ecchymoses/petechiae/wounds/abscess/warm/dry  NEURO:  Alert, oriented    Vital Signs:  Vital Signs Last 24 Hrs  T(C): 36.7 (01 Jul 2019 05:51), Max: 38.6 (30 Jun 2019 17:00)  T(F): 98.1 (01 Jul 2019 05:51), Max: 101.5 (30 Jun 2019 17:00)  HR: 88 (01 Jul 2019 05:51) (77 - 108)  BP: 131/65 (01 Jul 2019 05:51) (104/67 - 159/76)  BP(mean): 80 (30 Jun 2019 18:15) (80 - 113)  RR: 18 (01 Jul 2019 05:51) (16 - 20)  SpO2: 95% (01 Jul 2019 05:51) (92% - 100%)  Daily Height in cm: 157.48 (30 Jun 2019 13:06)    Daily     LABS:                        15.3   23.4  )-----------( 613      ( 30 Jun 2019 16:18 )             47.6     06-30    134<L>  |  99  |  35<H>  ----------------------------<  89  4.4   |  22  |  1.80<H>    Ca    8.0<L>      30 Jun 2019 16:18  Phos  2.9     06-30  Mg     1.9     06-30    TPro  5.5<L>  /  Alb  3.2<L>  /  TBili  1.0  /  DBili  0.4<H>  /  AST  49<H>  /  ALT  119<H>  /  AlkPhos  112  06-30    LIVER FUNCTIONS - ( 30 Jun 2019 06:39 )  Alb: 3.2 g/dL / Pro: 5.5 g/dL / ALK PHOS: 112 U/L / ALT: 119 U/L / AST: 49 U/L / GGT: x           PT/INR - ( 30 Jun 2019 11:36 )   PT: 13.6 sec;   INR: 1.18 ratio         PTT - ( 30 Jun 2019 11:36 )  PTT:30.9 sec        Imaging:    < from: CT Abdomen and Pelvis No Cont (06.29.19 @ 17:53) >  EXAM:  CT ABDOMEN AND PELVIS                        EXAM:  CT CHEST                        PROCEDURE DATE:  06/29/2019    INTERPRETATION:  CLINICAL INFORMATION: Abdominal pain.  Severe leukocytosis.  Rule out pneumonia.    COMPARISON: CT abdomen/pelvis 12/22/2014.  PROCEDURE:   CT of the Chest, Abdomen and Pelvis was performed without intravenous contrast.   Intravenous contrast: None.  Oral contrast: None.  Sagittal and coronal reformats were performed.    FINDINGS:    CHEST:     LUNGS AND LARGE AIRWAYS: Patent central airways. There is bibasilar linear subsegmental atelectasis versus scarring.  There are scattered calcified granulomas in both upper lobes and both lower lobes.  PLEURA: No pleural effusion.  VESSELS:Mild atherosclerotic calcification of the thoracic aorta and arch vessels.  HEART: Normal heart size.  No pericardial effusion.  Moderate atherosclerotic calcification the coronary arteries.  Mild calcification aortic valve leaflets.  MEDIASTINUM AND DAKOTAH: No gross lymphadenopathy; however lymph nodes are suboptimally assessed without contrast.  CHEST WALL AND LOWER NECK: Mildly heterogeneous thyroid without discrete nodule warranting follow-up.  Mild bilateral gynecomastia.    ABDOMEN AND PELVIS:    LIVER: Within normal limits.  BILE DUCTS: There are multiple gallstones in the distal common bile duct.  GALLBLADDER: Dilated thick-walled gallbladder with marked surrounding inflammatory change.  Multiple gallstones are seen in the gallbladder fundus, gallbladder neck and cystic duct.  SPLEEN: Within normal limits.  PANCREAS: Within normal limits.  ADRENALS: Within normal limits.  KIDNEYS/URETERS: Left renal cysts measure up to 5.5 cm in the lower pole and 3.5 cm in the upperpole.  Right renal cysts measure up to 3 cm.  Subcentimeter renal lesions bilaterally are too small to characterize by   CT scan.    BLADDER: Within normal limits.  REPRODUCTIVE ORGANS: The prostate measures approximately 3.9 x 4.6 x 4.5 cm and contains a TURP defect.    BOWEL: The patient is status post partial resection of the distal sigmoid colon.  No bowel obstruction.  Normal appendix.  Large amount of stool in the colon..  PERITONEUM: No ascites.  VESSELS:  Atherosclerotic changes.  RETROPERITONEUM: No lymphadenopathy.    ABDOMINAL WALL: Within normal limits.  BONES: Degenerative changes in the spine.  Status post right total hip arthroplasty.    IMPRESSION:  CHEST: No evidence of pneumonia.    ABDOMEN/PELVIS:  Acute cholecystitis and choledocholithiasis.  Constipation.      < end of copied text >

## 2019-07-01 NOTE — PROGRESS NOTE ADULT - ASSESSMENT
83 year old male with HTN, DM, BHP, HLD, diverticulitis s/p LAR (2012, Dr. Alberts), incisional hernia s/p repair with mesh (2014, Dr. Hutchinson), presenting with 3 days of RUQ pain, nausea, and emesis    IMPRESSION  - Abdominal pain with elevated liver enzymes (Tbili 1.0,  AST 49, , Alk phos 112), CT with choledocholithiasis, s/p PTC placement by IR on 7/2/2019  Afebrile, WBC 34-->23    RECOMMENDATION    - trend CMP, INR  - plan for ERCP tomorrow (7/2/2019), please keep NPO after midnight (due to no availability on the schedule today)  - continue IVF and antibiotics   - supportive care as per primary team    Yun gAuilar, PGY-6  GI fellow

## 2019-07-02 LAB
-  AMIKACIN: SIGNIFICANT CHANGE UP
-  AMIKACIN: SIGNIFICANT CHANGE UP
-  AMPICILLIN/SULBACTAM: SIGNIFICANT CHANGE UP
-  AMPICILLIN/SULBACTAM: SIGNIFICANT CHANGE UP
-  AMPICILLIN: SIGNIFICANT CHANGE UP
-  AZTREONAM: SIGNIFICANT CHANGE UP
-  AZTREONAM: SIGNIFICANT CHANGE UP
-  CEFAZOLIN: SIGNIFICANT CHANGE UP
-  CEFEPIME: SIGNIFICANT CHANGE UP
-  CEFEPIME: SIGNIFICANT CHANGE UP
-  CEFOXITIN: SIGNIFICANT CHANGE UP
-  CEFOXITIN: SIGNIFICANT CHANGE UP
-  CEFTRIAXONE: SIGNIFICANT CHANGE UP
-  CEFTRIAXONE: SIGNIFICANT CHANGE UP
-  CIPROFLOXACIN: SIGNIFICANT CHANGE UP
-  CIPROFLOXACIN: SIGNIFICANT CHANGE UP
-  ERTAPENEM: SIGNIFICANT CHANGE UP
-  ERTAPENEM: SIGNIFICANT CHANGE UP
-  GENTAMICIN: SIGNIFICANT CHANGE UP
-  GENTAMICIN: SIGNIFICANT CHANGE UP
-  IMIPENEM: SIGNIFICANT CHANGE UP
-  IMIPENEM: SIGNIFICANT CHANGE UP
-  LEVOFLOXACIN: SIGNIFICANT CHANGE UP
-  LEVOFLOXACIN: SIGNIFICANT CHANGE UP
-  MEROPENEM: SIGNIFICANT CHANGE UP
-  MEROPENEM: SIGNIFICANT CHANGE UP
-  PIPERACILLIN/TAZOBACTAM: SIGNIFICANT CHANGE UP
-  PIPERACILLIN/TAZOBACTAM: SIGNIFICANT CHANGE UP
-  TETRACYCLINE: SIGNIFICANT CHANGE UP
-  TOBRAMYCIN: SIGNIFICANT CHANGE UP
-  TOBRAMYCIN: SIGNIFICANT CHANGE UP
-  TRIMETHOPRIM/SULFAMETHOXAZOLE: SIGNIFICANT CHANGE UP
-  TRIMETHOPRIM/SULFAMETHOXAZOLE: SIGNIFICANT CHANGE UP
-  VANCOMYCIN: SIGNIFICANT CHANGE UP
ALBUMIN SERPL ELPH-MCNC: 2.8 G/DL — LOW (ref 3.3–5)
ALP SERPL-CCNC: 86 U/L — SIGNIFICANT CHANGE UP (ref 40–120)
ALT FLD-CCNC: 53 U/L — HIGH (ref 10–45)
ANION GAP SERPL CALC-SCNC: 12 MMOL/L — SIGNIFICANT CHANGE UP (ref 5–17)
AST SERPL-CCNC: 23 U/L — SIGNIFICANT CHANGE UP (ref 10–40)
BILIRUB SERPL-MCNC: 0.7 MG/DL — SIGNIFICANT CHANGE UP (ref 0.2–1.2)
BUN SERPL-MCNC: 24 MG/DL — HIGH (ref 7–23)
CALCIUM SERPL-MCNC: 8.1 MG/DL — LOW (ref 8.4–10.5)
CHLORIDE SERPL-SCNC: 101 MMOL/L — SIGNIFICANT CHANGE UP (ref 96–108)
CO2 SERPL-SCNC: 23 MMOL/L — SIGNIFICANT CHANGE UP (ref 22–31)
CREAT SERPL-MCNC: 1.85 MG/DL — HIGH (ref 0.5–1.3)
GLUCOSE BLDC GLUCOMTR-MCNC: 102 MG/DL — HIGH (ref 70–99)
GLUCOSE BLDC GLUCOMTR-MCNC: 103 MG/DL — HIGH (ref 70–99)
GLUCOSE BLDC GLUCOMTR-MCNC: 104 MG/DL — HIGH (ref 70–99)
GLUCOSE BLDC GLUCOMTR-MCNC: 90 MG/DL — SIGNIFICANT CHANGE UP (ref 70–99)
GLUCOSE SERPL-MCNC: 106 MG/DL — HIGH (ref 70–99)
HBA1C BLD-MCNC: 5.4 % — SIGNIFICANT CHANGE UP (ref 4–5.6)
HCT VFR BLD CALC: 42.5 % — SIGNIFICANT CHANGE UP (ref 39–50)
HGB BLD-MCNC: 13.5 G/DL — SIGNIFICANT CHANGE UP (ref 13–17)
MAGNESIUM SERPL-MCNC: 2 MG/DL — SIGNIFICANT CHANGE UP (ref 1.6–2.6)
MCHC RBC-ENTMCNC: 24.6 PG — LOW (ref 27–34)
MCHC RBC-ENTMCNC: 31.8 GM/DL — LOW (ref 32–36)
MCV RBC AUTO: 77.6 FL — LOW (ref 80–100)
METHOD TYPE: SIGNIFICANT CHANGE UP
PHOSPHATE SERPL-MCNC: 0.9 MG/DL — CRITICAL LOW (ref 2.5–4.5)
PHOSPHATE SERPL-MCNC: 2 MG/DL — LOW (ref 2.5–4.5)
PLATELET # BLD AUTO: 637 K/UL — HIGH (ref 150–400)
POTASSIUM SERPL-MCNC: 3.7 MMOL/L — SIGNIFICANT CHANGE UP (ref 3.5–5.3)
POTASSIUM SERPL-SCNC: 3.7 MMOL/L — SIGNIFICANT CHANGE UP (ref 3.5–5.3)
PROT SERPL-MCNC: 5.3 G/DL — LOW (ref 6–8.3)
RBC # BLD: 5.48 M/UL — SIGNIFICANT CHANGE UP (ref 4.2–5.8)
RBC # FLD: 17.5 % — HIGH (ref 10.3–14.5)
SODIUM SERPL-SCNC: 136 MMOL/L — SIGNIFICANT CHANGE UP (ref 135–145)
WBC # BLD: 14.32 K/UL — HIGH (ref 3.8–10.5)
WBC # FLD AUTO: 14.32 K/UL — HIGH (ref 3.8–10.5)

## 2019-07-02 PROCEDURE — 43264 ERCP REMOVE DUCT CALCULI: CPT | Mod: GC

## 2019-07-02 PROCEDURE — 43262 ENDO CHOLANGIOPANCREATOGRAPH: CPT | Mod: GC,59

## 2019-07-02 PROCEDURE — 74328 X-RAY BILE DUCT ENDOSCOPY: CPT | Mod: 26,GC

## 2019-07-02 RX ORDER — POTASSIUM CHLORIDE 20 MEQ
20 PACKET (EA) ORAL ONCE
Refills: 0 | Status: COMPLETED | OUTPATIENT
Start: 2019-07-02 | End: 2019-07-02

## 2019-07-02 RX ORDER — INSULIN LISPRO 100/ML
VIAL (ML) SUBCUTANEOUS AT BEDTIME
Refills: 0 | Status: DISCONTINUED | OUTPATIENT
Start: 2019-07-02 | End: 2019-07-03

## 2019-07-02 RX ORDER — SODIUM,POTASSIUM PHOSPHATES 278-250MG
1 POWDER IN PACKET (EA) ORAL
Refills: 0 | Status: COMPLETED | OUTPATIENT
Start: 2019-07-02 | End: 2019-07-02

## 2019-07-02 RX ORDER — INSULIN LISPRO 100/ML
VIAL (ML) SUBCUTANEOUS
Refills: 0 | Status: DISCONTINUED | OUTPATIENT
Start: 2019-07-02 | End: 2019-07-03

## 2019-07-02 RX ORDER — POTASSIUM PHOSPHATE, MONOBASIC POTASSIUM PHOSPHATE, DIBASIC 236; 224 MG/ML; MG/ML
15 INJECTION, SOLUTION INTRAVENOUS ONCE
Refills: 0 | Status: COMPLETED | OUTPATIENT
Start: 2019-07-02 | End: 2019-07-02

## 2019-07-02 RX ADMIN — Medication 1 PACKET(S): at 17:00

## 2019-07-02 RX ADMIN — POTASSIUM PHOSPHATE, MONOBASIC POTASSIUM PHOSPHATE, DIBASIC 62.5 MILLIMOLE(S): 236; 224 INJECTION, SOLUTION INTRAVENOUS at 17:01

## 2019-07-02 RX ADMIN — DEXTROSE MONOHYDRATE, SODIUM CHLORIDE, AND POTASSIUM CHLORIDE 100 MILLILITER(S): 50; .745; 4.5 INJECTION, SOLUTION INTRAVENOUS at 10:25

## 2019-07-02 RX ADMIN — HEPARIN SODIUM 5000 UNIT(S): 5000 INJECTION INTRAVENOUS; SUBCUTANEOUS at 05:32

## 2019-07-02 RX ADMIN — Medication 1 PACKET(S): at 23:37

## 2019-07-02 RX ADMIN — CARVEDILOL PHOSPHATE 12.5 MILLIGRAM(S): 80 CAPSULE, EXTENDED RELEASE ORAL at 05:32

## 2019-07-02 RX ADMIN — HEPARIN SODIUM 5000 UNIT(S): 5000 INJECTION INTRAVENOUS; SUBCUTANEOUS at 17:01

## 2019-07-02 RX ADMIN — POTASSIUM PHOSPHATE, MONOBASIC POTASSIUM PHOSPHATE, DIBASIC 62.5 MILLIMOLE(S): 236; 224 INJECTION, SOLUTION INTRAVENOUS at 08:32

## 2019-07-02 RX ADMIN — Medication 1 PACKET(S): at 21:52

## 2019-07-02 RX ADMIN — FINASTERIDE 5 MILLIGRAM(S): 5 TABLET, FILM COATED ORAL at 16:08

## 2019-07-02 RX ADMIN — DEXTROSE MONOHYDRATE, SODIUM CHLORIDE, AND POTASSIUM CHLORIDE 100 MILLILITER(S): 50; .745; 4.5 INJECTION, SOLUTION INTRAVENOUS at 16:09

## 2019-07-02 RX ADMIN — Medication 20 MILLIEQUIVALENT(S): at 16:08

## 2019-07-02 RX ADMIN — CARVEDILOL PHOSPHATE 12.5 MILLIGRAM(S): 80 CAPSULE, EXTENDED RELEASE ORAL at 17:01

## 2019-07-02 RX ADMIN — PIPERACILLIN AND TAZOBACTAM 25 GRAM(S): 4; .5 INJECTION, POWDER, LYOPHILIZED, FOR SOLUTION INTRAVENOUS at 23:37

## 2019-07-02 RX ADMIN — DEXTROSE MONOHYDRATE, SODIUM CHLORIDE, AND POTASSIUM CHLORIDE 100 MILLILITER(S): 50; .745; 4.5 INJECTION, SOLUTION INTRAVENOUS at 00:00

## 2019-07-02 RX ADMIN — Medication 300 MILLIGRAM(S): at 21:52

## 2019-07-02 RX ADMIN — PIPERACILLIN AND TAZOBACTAM 25 GRAM(S): 4; .5 INJECTION, POWDER, LYOPHILIZED, FOR SOLUTION INTRAVENOUS at 05:32

## 2019-07-02 RX ADMIN — Medication 1 MILLIGRAM(S): at 16:08

## 2019-07-02 RX ADMIN — Medication 1000 UNIT(S): at 17:01

## 2019-07-02 RX ADMIN — PIPERACILLIN AND TAZOBACTAM 25 GRAM(S): 4; .5 INJECTION, POWDER, LYOPHILIZED, FOR SOLUTION INTRAVENOUS at 16:09

## 2019-07-02 RX ADMIN — Medication 1000 UNIT(S): at 05:32

## 2019-07-02 RX ADMIN — MONTELUKAST 10 MILLIGRAM(S): 4 TABLET, CHEWABLE ORAL at 16:09

## 2019-07-02 NOTE — PROGRESS NOTE ADULT - ASSESSMENT
83M with hx of HTN, DM, BHP, HLD, diverticulitis s/p LAR (2012, Dr. Alberts), incisional hernia s/p repair with mesh (2014, Dr. Hutchinson), presenting with acute cholecystitis and choledocholithiasis.    - NPO, IV fluids  - Antibiotics (zosyn)  - ERCP today  - DVT ppx    Green team surgery  Pager [phone]

## 2019-07-02 NOTE — PROGRESS NOTE ADULT - SUBJECTIVE AND OBJECTIVE BOX
Pre-Endoscopy Evaluation      Referring Physician:  dr. henrique hankins                                Procedure: ercp    Indication for Procedure:    Pertinent History:    Sedation by Anesthesia [x]    PAST MEDICAL & SURGICAL HISTORY:  Restless leg syndrome  Seasonal allergies  BPH (Benign Prostatic Hyperplasia)  Cholelithiasis  Type 2 diabetes mellitus  Diverticulitis  IBS (irritable bowel syndrome)  Darier's disease  MVP (mitral valve prolapse)  PVC's (premature ventricular contractions)  Hyperlipidemia  HTN (hypertension)  Hx of left hemicolectomy  Anal fistula: s/p surgery  S/P hernia repair: bilateral  S/P TURP: x 2  S/P tonsillectomy      PMH of Gastroparesis [ ]  Gastric Surgery [ ]  Gastric Outlet Obstruction [ ]    Allergies    Compazine (Other)  fish (Anaphylaxis)  iodine containing compounds (Rash; Short breath)  sulfa drugs (Hives)  zolpidem (Other)    Intolerances        Latex allergy: [ ] yes [ ] no    Medications:MEDICATIONS  (STANDING):  allopurinol 300 milliGRAM(s) Oral at bedtime  carvedilol 12.5 milliGRAM(s) Oral every 12 hours  cholecalciferol 1000 Unit(s) Oral two times a day  dextrose 5% + sodium chloride 0.45% with potassium chloride 20 mEq/L 1000 milliLiter(s) (100 mL/Hr) IV Continuous <Continuous>  dextrose 50% Injectable 12.5 Gram(s) IV Push once  dextrose 50% Injectable 25 Gram(s) IV Push once  dextrose 50% Injectable 25 Gram(s) IV Push once  docusate sodium 100 milliGRAM(s) Oral two times a day  finasteride 5 milliGRAM(s) Oral daily  folic acid 1 milliGRAM(s) Oral daily  heparin  Injectable 5000 Unit(s) SubCutaneous every 8 hours  insulin lispro (HumaLOG) corrective regimen sliding scale   SubCutaneous every 6 hours  montelukast 10 milliGRAM(s) Oral daily  piperacillin/tazobactam IVPB.. 3.375 Gram(s) IV Intermittent every 8 hours  polyethylene glycol 3350 17 Gram(s) Oral daily  senna 2 Tablet(s) Oral at bedtime    MEDICATIONS  (PRN):  acetaminophen   Tablet .. 650 milliGRAM(s) Oral every 6 hours PRN Mild Pain (1 - 3)  dextrose 40% Gel 15 Gram(s) Oral once PRN Blood Glucose LESS THAN 70 milliGRAM(s)/deciliter  glucagon  Injectable 1 milliGRAM(s) IntraMuscular once PRN Glucose LESS THAN 70 milligrams/deciliter  morphine  - Injectable 2 milliGRAM(s) IV Push every 4 hours PRN Moderate Pain (4 - 6)      Smoking: [ ] yes  [ ] no    AICD/PPM: [ ] yes   [ ] no    Pertinent lab data:                        13.5   14.32 )-----------( 637      ( 02 Jul 2019 09:51 )             42.5     07-02    136  |  101  |  24<H>  ----------------------------<  106<H>  3.7   |  23  |  1.85<H>    Ca    8.1<L>      02 Jul 2019 07:08  Phos  0.9     07-02  Mg     2.0     07-02    TPro  5.3<L>  /  Alb  2.8<L>  /  TBili  0.7  /  DBili  x   /  AST  23  /  ALT  53<H>  /  AlkPhos  86  07-02    PT/INR - ( 01 Jul 2019 09:38 )   PT: 14.0 sec;   INR: 1.23 ratio         PTT - ( 01 Jul 2019 09:38 )  PTT:31.3 sec              Physical Examination:  Daily     Daily   Vital Signs Last 24 Hrs  T(C): 36.7 (02 Jul 2019 10:19), Max: 37.6 (02 Jul 2019 01:12)  T(F): 98.1 (02 Jul 2019 10:19), Max: 99.6 (02 Jul 2019 01:12)  HR: 73 (02 Jul 2019 10:19) (71 - 80)  BP: 151/82 (02 Jul 2019 10:19) (117/64 - 151/82)  BP(mean): --  RR: 18 (02 Jul 2019 10:19) (17 - 18)  SpO2: 93% (02 Jul 2019 10:19) (92% - 97%)    Drug Dosing Weight  Height (cm): 157.48 (30 Jun 2019 13:06)  Weight (kg): 59.9 (30 Jun 2019 13:06)  BMI (kg/m2): 24.2 (30 Jun 2019 13:06)  BSA (m2): 1.6 (30 Jun 2019 13:06)    Constitutional: NAD    HEENT: PERRLA, EOMI,       Neck:  No JVD    Respiratory: CTAB/L    Cardiovascular: S1 and S2    Gastrointestinal: BS+, soft, NT/ND    Extremities: No peripheral edema    Neurological: A/O x 3, no focal deficits    Psychiatric: Normal mood, normal affect    : No Suarez    Skin: No rashes    Comments:    ASA Class: I [ ]  II [ ]  III [ ]  IV [ ]    The patient is a suitable candidate for the planned procedure unless box checked [ ]  No, explain: Pre-Endoscopy Evaluation      Referring Physician:  dr. henrique hankins                                Procedure: ercp    Indication for Procedure: choledochoithiasis    Pertinent History: 83 year old male with HTN, DM, BHP, HLD, diverticulitis s/p LAR (2012, Dr. Alberts), incisional hernia s/p repair 2014 with abdominal pain with elevated liver enzymes. CT showed choledocholithiasis   s/p PTC placement by IR on 6/30/19    Sedation by Anesthesia [x]    PAST MEDICAL & SURGICAL HISTORY:  Restless leg syndrome  Seasonal allergies  BPH (Benign Prostatic Hyperplasia)  Cholelithiasis  Type 2 diabetes mellitus  Diverticulitis  IBS (irritable bowel syndrome)  Darier's disease  MVP (mitral valve prolapse)  PVC's (premature ventricular contractions)  Hyperlipidemia  HTN (hypertension)  Hx of left hemicolectomy  Anal fistula: s/p surgery  S/P hernia repair: bilateral  S/P TURP: x 2  S/P tonsillectomy      PMH of Gastroparesis [ ]  Gastric Surgery [ ]  Gastric Outlet Obstruction [ ]    Allergies:    Compazine (Other)  fish (Anaphylaxis)  iodine containing compounds (Rash; Short breath)  sulfa drugs (Hives)  zolpidem (Other)    Intolerances:    Latex allergy: [ ] yes [x] no    Medications:MEDICATIONS  (STANDING):  allopurinol 300 milliGRAM(s) Oral at bedtime  carvedilol 12.5 milliGRAM(s) Oral every 12 hours  cholecalciferol 1000 Unit(s) Oral two times a day  dextrose 5% + sodium chloride 0.45% with potassium chloride 20 mEq/L 1000 milliLiter(s) (100 mL/Hr) IV Continuous <Continuous>  dextrose 50% Injectable 12.5 Gram(s) IV Push once  dextrose 50% Injectable 25 Gram(s) IV Push once  dextrose 50% Injectable 25 Gram(s) IV Push once  docusate sodium 100 milliGRAM(s) Oral two times a day  finasteride 5 milliGRAM(s) Oral daily  folic acid 1 milliGRAM(s) Oral daily  heparin  Injectable 5000 Unit(s) SubCutaneous every 8 hours  insulin lispro (HumaLOG) corrective regimen sliding scale   SubCutaneous every 6 hours  montelukast 10 milliGRAM(s) Oral daily  piperacillin/tazobactam IVPB.. 3.375 Gram(s) IV Intermittent every 8 hours  polyethylene glycol 3350 17 Gram(s) Oral daily  senna 2 Tablet(s) Oral at bedtime    MEDICATIONS  (PRN):  acetaminophen   Tablet .. 650 milliGRAM(s) Oral every 6 hours PRN Mild Pain (1 - 3)  dextrose 40% Gel 15 Gram(s) Oral once PRN Blood Glucose LESS THAN 70 milliGRAM(s)/deciliter  glucagon  Injectable 1 milliGRAM(s) IntraMuscular once PRN Glucose LESS THAN 70 milligrams/deciliter  morphine  - Injectable 2 milliGRAM(s) IV Push every 4 hours PRN Moderate Pain (4 - 6)      Smoking: [ ] yes  [x] no    AICD/PPM: [ ] yes   [x] no    Pertinent lab data:                        13.5   14.32 )-----------( 637      ( 02 Jul 2019 09:51 )             42.5     07-02    136  |  101  |  24<H>  ----------------------------<  106<H>  3.7   |  23  |  1.85<H>    Ca    8.1<L>      02 Jul 2019 07:08  Phos  0.9     07-02  Mg     2.0     07-02    TPro  5.3<L>  /  Alb  2.8<L>  /  TBili  0.7  /  DBili  x   /  AST  23  /  ALT  53<H>  /  AlkPhos  86  07-02    PT/INR - ( 01 Jul 2019 09:38 )   PT: 14.0 sec;   INR: 1.23 ratio         PTT - ( 01 Jul 2019 09:38 )  PTT:31.3 sec    CAPILLARY BLOOD GLUCOSE  POCT Blood Glucose.: 102 mg/dL (02 Jul 2019 05:34)      Physical Examination:      Daily   Vital Signs Last 24 Hrs  T(C): 36.7 (02 Jul 2019 10:19), Max: 37.6 (02 Jul 2019 01:12)  T(F): 98.1 (02 Jul 2019 10:19), Max: 99.6 (02 Jul 2019 01:12)  HR: 73 (02 Jul 2019 10:19) (71 - 80)  BP: 151/82 (02 Jul 2019 10:19) (117/64 - 151/82)  BP(mean): --  RR: 18 (02 Jul 2019 10:19) (17 - 18)  SpO2: 93% (02 Jul 2019 10:19) (92% - 97%)    Drug Dosing Weight  Height (cm): 157.48 (30 Jun 2019 13:06)  Weight (kg): 59.9 (30 Jun 2019 13:06)  BMI (kg/m2): 24.2 (30 Jun 2019 13:06)  BSA (m2): 1.6 (30 Jun 2019 13:06)    Constitutional: NAD     Neck:  No JVD    Respiratory: CTAB/L    Cardiovascular: S1 and S2    Gastrointestinal: BS+, soft, NT/ND    Extremities: No peripheral edema    Neurological: A/O x 3    : No Suarez    Skin: No rashes    Comments:    ASA Class: I [ ]  II [ ]  III [x]  IV [ ]    The patient is a suitable candidate for the planned procedure unless box checked [ ]  No, explain:

## 2019-07-02 NOTE — PROGRESS NOTE ADULT - SUBJECTIVE AND OBJECTIVE BOX
Interval Events:    S: Patient doing well, denies fevers, chills, nausea, emesis, chest pain, SOB.    O: Vital Signs  T(C): 36.7 (07-02 @ 05:40), Max: 37.6 (07-02 @ 01:12)  HR: 73 (07-02 @ 05:40) (63 - 80)  BP: 139/70 (07-02 @ 05:40) (117/64 - 139/70)  RR: 18 (07-02 @ 05:40) (17 - 18)  SpO2: 93% (07-02 @ 05:40) (92% - 97%)  06-30-19 @ 07:01  -  07-01-19 @ 07:00  --------------------------------------------------------  IN: 2475 mL / OUT: 1030 mL / NET: 1445 mL    07-01-19 @ 07:01  -  07-02-19 @ 05:55  --------------------------------------------------------  IN: 940 mL / OUT: 920 mL / NET: 20 mL      General: alert and oriented, NAD  Resp: airway patent, respirations unlabored  CVS: regular rate and rhythm  Abdomen: soft, nontender, nondistended, percutaneous cholesytostomy tube in place, dressing c/d/i  Extremities: no edema  Skin: warm, dry, appropriate color                          13.3   16.37 )-----------( 549      ( 01 Jul 2019 09:40 )             42.1   07-01    135  |  100  |  34<H>  ----------------------------<  79  3.9   |  22  |  1.64<H>    Ca    8.0<L>      01 Jul 2019 07:04  Phos  2.5     07-01  Mg     2.0     07-01    TPro  5.3<L>  /  Alb  2.7<L>  /  TBili  0.8  /  DBili  0.3<H>  /  AST  21  /  ALT  69<H>  /  AlkPhos  86  07-01

## 2019-07-02 NOTE — PHYSICAL THERAPY INITIAL EVALUATION ADULT - PERTINENT HX OF CURRENT PROBLEM, REHAB EVAL
83M with hx of HTN, DM, BHP, HLD, diverticulitis s/p LAR (2012, Dr. Alberts), incisional hernia s/p repair with mesh (2014, Dr. Hutchinson), presenting with 3 days of RUQ pain, nausea, and emesis. He has never had pain like this in the past. He has eaten very little since the pain began. Denies constipation, diarrhea, or shortness of breath. CT chest: (-)PNA, CT abd: acute cholecystitis &choledochithiasis

## 2019-07-02 NOTE — PHYSICAL THERAPY INITIAL EVALUATION ADULT - PLANNED THERAPY INTERVENTIONS, PT EVAL
transfer training/gait training/STAIRS GOAL: pt will negotiate 5 steps with 1 HR independently by 2 weeks

## 2019-07-02 NOTE — PHYSICAL THERAPY INITIAL EVALUATION ADULT - ADDITIONAL COMMENTS
pt states lives in private house with son. split level, 5 steps to negotiate. pt independent with mobility and did not use assistive device, son available to assist with needs

## 2019-07-02 NOTE — PHYSICAL THERAPY INITIAL EVALUATION ADULT - CRITERIA FOR SKILLED THERAPEUTIC INTERVENTIONS
impairments found/predicted duration of therapy intervention/therapy frequency/anticipated equipment needs at discharge/anticipated discharge recommendation

## 2019-07-03 ENCOUNTER — TRANSCRIPTION ENCOUNTER (OUTPATIENT)
Age: 84
End: 2019-07-03

## 2019-07-03 VITALS — SYSTOLIC BLOOD PRESSURE: 141 MMHG | DIASTOLIC BLOOD PRESSURE: 70 MMHG | OXYGEN SATURATION: 92 %

## 2019-07-03 LAB
ALBUMIN SERPL ELPH-MCNC: 2.8 G/DL — LOW (ref 3.3–5)
ALP SERPL-CCNC: 76 U/L — SIGNIFICANT CHANGE UP (ref 40–120)
ALT FLD-CCNC: 43 U/L — SIGNIFICANT CHANGE UP (ref 10–45)
ANION GAP SERPL CALC-SCNC: 13 MMOL/L — SIGNIFICANT CHANGE UP (ref 5–17)
AST SERPL-CCNC: 26 U/L — SIGNIFICANT CHANGE UP (ref 10–40)
BILIRUB DIRECT SERPL-MCNC: 0.2 MG/DL — SIGNIFICANT CHANGE UP (ref 0–0.2)
BILIRUB INDIRECT FLD-MCNC: 0.4 MG/DL — SIGNIFICANT CHANGE UP (ref 0.2–1)
BILIRUB SERPL-MCNC: 0.6 MG/DL — SIGNIFICANT CHANGE UP (ref 0.2–1.2)
BUN SERPL-MCNC: 15 MG/DL — SIGNIFICANT CHANGE UP (ref 7–23)
CALCIUM SERPL-MCNC: 8 MG/DL — LOW (ref 8.4–10.5)
CHLORIDE SERPL-SCNC: 103 MMOL/L — SIGNIFICANT CHANGE UP (ref 96–108)
CO2 SERPL-SCNC: 23 MMOL/L — SIGNIFICANT CHANGE UP (ref 22–31)
CREAT SERPL-MCNC: 1.59 MG/DL — HIGH (ref 0.5–1.3)
GLUCOSE BLDC GLUCOMTR-MCNC: 125 MG/DL — HIGH (ref 70–99)
GLUCOSE BLDC GLUCOMTR-MCNC: 80 MG/DL — SIGNIFICANT CHANGE UP (ref 70–99)
GLUCOSE SERPL-MCNC: 88 MG/DL — SIGNIFICANT CHANGE UP (ref 70–99)
HCT VFR BLD CALC: 42.3 % — SIGNIFICANT CHANGE UP (ref 39–50)
HGB BLD-MCNC: 13.3 G/DL — SIGNIFICANT CHANGE UP (ref 13–17)
MAGNESIUM SERPL-MCNC: 1.8 MG/DL — SIGNIFICANT CHANGE UP (ref 1.6–2.6)
MCHC RBC-ENTMCNC: 24.6 PG — LOW (ref 27–34)
MCHC RBC-ENTMCNC: 31.4 GM/DL — LOW (ref 32–36)
MCV RBC AUTO: 78.2 FL — LOW (ref 80–100)
PHOSPHATE SERPL-MCNC: 2.1 MG/DL — LOW (ref 2.5–4.5)
PLATELET # BLD AUTO: 593 K/UL — HIGH (ref 150–400)
POTASSIUM SERPL-MCNC: 4.1 MMOL/L — SIGNIFICANT CHANGE UP (ref 3.5–5.3)
POTASSIUM SERPL-SCNC: 4.1 MMOL/L — SIGNIFICANT CHANGE UP (ref 3.5–5.3)
PROT SERPL-MCNC: 5.1 G/DL — LOW (ref 6–8.3)
RBC # BLD: 5.41 M/UL — SIGNIFICANT CHANGE UP (ref 4.2–5.8)
RBC # FLD: 17.4 % — HIGH (ref 10.3–14.5)
SODIUM SERPL-SCNC: 139 MMOL/L — SIGNIFICANT CHANGE UP (ref 135–145)
WBC # BLD: 13.76 K/UL — HIGH (ref 3.8–10.5)
WBC # FLD AUTO: 13.76 K/UL — HIGH (ref 3.8–10.5)

## 2019-07-03 PROCEDURE — 93010 ELECTROCARDIOGRAM REPORT: CPT

## 2019-07-03 PROCEDURE — 85610 PROTHROMBIN TIME: CPT

## 2019-07-03 PROCEDURE — 84132 ASSAY OF SERUM POTASSIUM: CPT

## 2019-07-03 PROCEDURE — 84100 ASSAY OF PHOSPHORUS: CPT

## 2019-07-03 PROCEDURE — 83735 ASSAY OF MAGNESIUM: CPT

## 2019-07-03 PROCEDURE — 87040 BLOOD CULTURE FOR BACTERIA: CPT

## 2019-07-03 PROCEDURE — 87075 CULTR BACTERIA EXCEPT BLOOD: CPT

## 2019-07-03 PROCEDURE — 87205 SMEAR GRAM STAIN: CPT

## 2019-07-03 PROCEDURE — 76705 ECHO EXAM OF ABDOMEN: CPT

## 2019-07-03 PROCEDURE — 80053 COMPREHEN METABOLIC PANEL: CPT

## 2019-07-03 PROCEDURE — 82803 BLOOD GASES ANY COMBINATION: CPT

## 2019-07-03 PROCEDURE — 83036 HEMOGLOBIN GLYCOSYLATED A1C: CPT

## 2019-07-03 PROCEDURE — 83690 ASSAY OF LIPASE: CPT

## 2019-07-03 PROCEDURE — 97162 PT EVAL MOD COMPLEX 30 MIN: CPT

## 2019-07-03 PROCEDURE — C1729: CPT

## 2019-07-03 PROCEDURE — 99285 EMERGENCY DEPT VISIT HI MDM: CPT | Mod: 25

## 2019-07-03 PROCEDURE — 83605 ASSAY OF LACTIC ACID: CPT

## 2019-07-03 PROCEDURE — 84295 ASSAY OF SERUM SODIUM: CPT

## 2019-07-03 PROCEDURE — 80048 BASIC METABOLIC PNL TOTAL CA: CPT

## 2019-07-03 PROCEDURE — 87070 CULTURE OTHR SPECIMN AEROBIC: CPT

## 2019-07-03 PROCEDURE — 93005 ELECTROCARDIOGRAM TRACING: CPT

## 2019-07-03 PROCEDURE — C1889: CPT

## 2019-07-03 PROCEDURE — 87186 SC STD MICRODIL/AGAR DIL: CPT

## 2019-07-03 PROCEDURE — 82435 ASSAY OF BLOOD CHLORIDE: CPT

## 2019-07-03 PROCEDURE — 99232 SBSQ HOSP IP/OBS MODERATE 35: CPT | Mod: GC

## 2019-07-03 PROCEDURE — 74176 CT ABD & PELVIS W/O CONTRAST: CPT

## 2019-07-03 PROCEDURE — 85730 THROMBOPLASTIN TIME PARTIAL: CPT

## 2019-07-03 PROCEDURE — C1769: CPT

## 2019-07-03 PROCEDURE — 80076 HEPATIC FUNCTION PANEL: CPT

## 2019-07-03 PROCEDURE — 47490 INCISION OF GALLBLADDER: CPT

## 2019-07-03 PROCEDURE — 71250 CT THORAX DX C-: CPT

## 2019-07-03 PROCEDURE — 85027 COMPLETE CBC AUTOMATED: CPT

## 2019-07-03 PROCEDURE — 96374 THER/PROPH/DIAG INJ IV PUSH: CPT

## 2019-07-03 PROCEDURE — 82330 ASSAY OF CALCIUM: CPT

## 2019-07-03 PROCEDURE — 82962 GLUCOSE BLOOD TEST: CPT

## 2019-07-03 PROCEDURE — 86901 BLOOD TYPING SEROLOGIC RH(D): CPT

## 2019-07-03 PROCEDURE — 85014 HEMATOCRIT: CPT

## 2019-07-03 PROCEDURE — 86900 BLOOD TYPING SEROLOGIC ABO: CPT

## 2019-07-03 PROCEDURE — 82947 ASSAY GLUCOSE BLOOD QUANT: CPT

## 2019-07-03 PROCEDURE — 86850 RBC ANTIBODY SCREEN: CPT

## 2019-07-03 RX ORDER — SODIUM,POTASSIUM PHOSPHATES 278-250MG
1 POWDER IN PACKET (EA) ORAL ONCE
Refills: 0 | Status: COMPLETED | OUTPATIENT
Start: 2019-07-03 | End: 2019-07-03

## 2019-07-03 RX ORDER — ACETAMINOPHEN 500 MG
2 TABLET ORAL
Qty: 0 | Refills: 0 | DISCHARGE
Start: 2019-07-03

## 2019-07-03 RX ADMIN — CARVEDILOL PHOSPHATE 12.5 MILLIGRAM(S): 80 CAPSULE, EXTENDED RELEASE ORAL at 07:10

## 2019-07-03 RX ADMIN — MONTELUKAST 10 MILLIGRAM(S): 4 TABLET, CHEWABLE ORAL at 13:18

## 2019-07-03 RX ADMIN — Medication 1 PACKET(S): at 12:28

## 2019-07-03 RX ADMIN — HEPARIN SODIUM 5000 UNIT(S): 5000 INJECTION INTRAVENOUS; SUBCUTANEOUS at 07:12

## 2019-07-03 RX ADMIN — Medication 1000 UNIT(S): at 07:10

## 2019-07-03 RX ADMIN — PIPERACILLIN AND TAZOBACTAM 25 GRAM(S): 4; .5 INJECTION, POWDER, LYOPHILIZED, FOR SOLUTION INTRAVENOUS at 09:32

## 2019-07-03 RX ADMIN — FINASTERIDE 5 MILLIGRAM(S): 5 TABLET, FILM COATED ORAL at 13:24

## 2019-07-03 RX ADMIN — HEPARIN SODIUM 5000 UNIT(S): 5000 INJECTION INTRAVENOUS; SUBCUTANEOUS at 15:48

## 2019-07-03 RX ADMIN — Medication 1 MILLIGRAM(S): at 13:18

## 2019-07-03 NOTE — PROGRESS NOTE ADULT - PROVIDER SPECIALTY LIST ADULT
Gastroenterology
Intervent Radiology
Surgery

## 2019-07-03 NOTE — DISCHARGE NOTE PROVIDER - HOSPITAL COURSE
Mr. Parham is a 83yom with PMH significant for HTN, DM, BPH, HLD, and diverticulitis s/p LAR (2012, Dr. Alberts) who presented on 6/29 with right upper quadrant abdominal pain, nausea, and vomiting.  He was febrile to 38.1, with a WBC count of 34.3, bilirubin 1.1, alk phos 141, AST//188, lactate 1.4, and ultrasound imaging showing cholecystitis with stones in the CB.  He was diagnosed with acute cholecystitis with choledocholithiasis. She is now status post percutaneous cholecystostomy on 6/30 followed bu ERCP with sphincterotomy and removal of stones on 7/2. After surgery his white blood cell count and liver function tests fell steadily. He remained afebrile.  His pain was well controlled, he was able to tolerate a diet without N/V, maldonado was removed and he voided spontaneously and he was  capable of ambulating on his own. Mr. Parham is a 83yom with PMH significant for HTN, DM, BPH, HLD, and diverticulitis s/p LAR (2012, Dr. Alberts) who presented on 6/29 with right upper quadrant abdominal pain, nausea, and vomiting.  He was febrile to 38.1, with a WBC count of 34.3, bilirubin 1.1, alk phos 141, AST//188, lactate 1.4, and ultrasound imaging showing cholecystitis with stones in the CB.  He was diagnosed with acute cholecystitis with choledocholithiasis. She is now status post percutaneous cholecystostomy on 6/30 followed bu ERCP with sphincterotomy and removal of stones on 7/2. After surgery his white blood cell count and liver function tests fell steadily. He remained afebrile.  His pain was well controlled, he was able to tolerate a diet without N/V, maldonado was removed and he voided spontaneously and he was  capable of ambulating on his own.  Patient was advised that he should record drain output, and follow-up with surgery in two weeks and interventional radiology in three weeks for drain removal Mr. Parham is a 83yom with PMH significant for HTN, DM, BPH, HLD, and diverticulitis s/p LAR (2012, Dr. Alberts) who presented on 6/29 with right upper quadrant abdominal pain, nausea, and vomiting.  He was febrile to 38.1, with a WBC count of 34.3, bilirubin 1.1, alk phos 141, AST//188, lactate 1.4, and ultrasound imaging showing cholecystitis with stones in the CB. Sepsis was present on admission, secondary to acute cholecystitis which resolved upon perc des tube placement. He was diagnosed with acute cholecystitis with choledocholithiasis. She is now status post percutaneous cholecystostomy on 6/30 followed bu ERCP with sphincterotomy and removal of stones on 7/2. After surgery his white blood cell count and liver function tests fell steadily. He remained afebrile.  His pain was well controlled, he was able to tolerate a diet without N/V, maldonado was removed and he voided spontaneously and he was  capable of ambulating on his own.  Patient was advised that he should record drain output, and follow-up with surgery in two weeks and interventional radiology in three weeks for drain removal

## 2019-07-03 NOTE — DISCHARGE NOTE PROVIDER - CARE PROVIDER_API CALL
Rogerio Hutchinson (MD)  Surgery  [address], [address]  [place], [place] [id number]  Phone: [phone]  Fax: [phone]  Follow Up Time:     Bib Raymundo)  Interventional Radiology and Diagnostic Radiology  [address] [place] [address]  Phone: [phone]  Fax: [phone]  Follow Up Time: 2 weeks

## 2019-07-03 NOTE — PROGRESS NOTE ADULT - ASSESSMENT
83 year old male with HTN, DM, BHP, HLD, diverticulitis s/p LAR (2012, Dr. Alberts), incisional hernia s/p repair with mesh (2014, Dr. Hutchinson), presenting with 3 days of RUQ pain, nausea, and emesis    IMPRESSION  - Abdominal pain with elevated liver enzymes (Tbili 1.0,  AST 49, , Alk phos 112), CT with choledocholithiasis, s/p PTC placement by IR on 7/2/2019. S/P ERCP with sphincterotomy and complete removal by biliary sphincterotomy and balloon extraction  Afebrile, Leukocytosis- improving    RECOMMENDATION  -IVF (to complete 2L)  -Clear liquid diet today, then advance diet as tolerated  -Rest of care per primary team

## 2019-07-03 NOTE — PROGRESS NOTE ADULT - REASON FOR ADMISSION
Cholecystitis

## 2019-07-03 NOTE — DISCHARGE NOTE PROVIDER - NSDCFUADDINST_GEN_ALL_CORE_FT
Follow-up with interventional radiology in 3 weeks (Dr. Bib Raymundo) Follow-up with interventional radiology in 3 weeks (Dr. Bib Raymundo)    Restart home aspirin tomorrow (7/4) Follow-up with interventional radiology in 3 weeks (Dr. Bib Raymundo)  Please call [phone] to make appointment with interventional radiology. An appointment for 7/17/19 at 1pm has already been made for you. You will report to the main entrance of [facility].   Restart home aspirin tomorrow (7/4) Follow-up with interventional radiology in 3 weeks (Dr. Bib Raymundo)  Please call [phone] to make appointment with interventional radiology. An appointment for 7/18/19 at 1pm has already been made for you. You will report to the main entrance of [facility].   Restart home aspirin tomorrow (7/4)

## 2019-07-03 NOTE — DISCHARGE NOTE PROVIDER - CARE PROVIDERS DIRECT ADDRESSES
[email or web address]

## 2019-07-03 NOTE — DISCHARGE NOTE NURSING/CASE MANAGEMENT/SOCIAL WORK - NSDCDPATPORTLINK_GEN_ALL_CORE
You can access the [facility] Patient Portal, offered by [facility], by registering with the following website: [email or web address]

## 2019-07-03 NOTE — PROGRESS NOTE ADULT - SUBJECTIVE AND OBJECTIVE BOX
Interval Events: C/o L leg weakness and L groin pain, neurology consult obtained.    S: Patient doing well, denies fevers, chills, nausea, emesis, chest pain, SOB.    O: Vital Signs  T(C): 36.9 (07-03 @ 04:24), Max: 36.9 (07-02 @ 15:48)  HR: 57 (07-03 @ 04:24) (57 - 73)  BP: 153/69 (07-03 @ 04:24) (139/70 - 153/69)  RR: 18 (07-03 @ 04:24) (18 - 20)  SpO2: 92% (07-03 @ 04:24) (92% - 95%)  07-01-19 @ 07:01  -  07-02-19 @ 07:00  --------------------------------------------------------  IN: 1740 mL / OUT: 920 mL / NET: 820 mL    07-02-19 @ 07:01  -  07-03-19 @ 04:59  --------------------------------------------------------  IN: 1675 mL / OUT: 1400 mL / NET: 275 mL      General: alert and oriented, NAD  Resp: airway patent, respirations unlabored  CVS: regular rate and rhythm  Abdomen: soft, nontender, nondistended  Extremities: no edema  Skin: warm, dry, appropriate color                          13.5   14.32 )-----------( 637      ( 02 Jul 2019 09:51 )             42.5   07-02    136  |  101  |  24<H>  ----------------------------<  106<H>  3.7   |  23  |  1.85<H>    Ca    8.1<L>      02 Jul 2019 07:08  Phos  2.0     07-02  Mg     2.0     07-02    TPro  5.3<L>  /  Alb  2.8<L>  /  TBili  0.7  /  DBili  x   /  AST  23  /  ALT  53<H>  /  AlkPhos  86  07-02 Interval Events:  ERCP yesterday. Pt tolerated procedure well.    S: Patient doing well, denies fevers, chills, nausea, emesis, chest pain, SOB.    O: Vital Signs  T(C): 36.9 (07-03 @ 04:24), Max: 36.9 (07-02 @ 15:48)  HR: 57 (07-03 @ 04:24) (57 - 73)  BP: 153/69 (07-03 @ 04:24) (139/70 - 153/69)  RR: 18 (07-03 @ 04:24) (18 - 20)  SpO2: 92% (07-03 @ 04:24) (92% - 95%)  07-01-19 @ 07:01  -  07-02-19 @ 07:00  --------------------------------------------------------  IN: 1740 mL / OUT: 920 mL / NET: 820 mL    07-02-19 @ 07:01  -  07-03-19 @ 05:02  --------------------------------------------------------  IN: 1675 mL / OUT: 1400 mL / NET: 275 mL      General: alert and oriented, NAD  Resp: airway patent, respirations unlabored  CVS: regular rate and rhythm  Abdomen: soft, nontender, nondistended  Extremities: no edema  Skin: warm, dry, appropriate color                          13.5   14.32 )-----------( 637      ( 02 Jul 2019 09:51 )             42.5   07-02    136  |  101  |  24<H>  ----------------------------<  106<H>  3.7   |  23  |  1.85<H>    Ca    8.1<L>      02 Jul 2019 07:08  Phos  2.0     07-02  Mg     2.0     07-02    TPro  5.3<L>  /  Alb  2.8<L>  /  TBili  0.7  /  DBili  x   /  AST  23  /  ALT  53<H>  /  AlkPhos  86  07-02 Interval Events:  ERCP yesterday. Pt tolerated procedure well. Stated overnight that he was having mild blood streaking on toilet paper. History of hemorrhoids.    S: Patient doing well, denies fevers, chills, nausea, emesis, chest pain, SOB.     O: Vital Signs  T(C): 36.9 (07-03 @ 04:24), Max: 36.9 (07-02 @ 15:48)  HR: 57 (07-03 @ 04:24) (57 - 73)  BP: 153/69 (07-03 @ 04:24) (139/70 - 153/69)  RR: 18 (07-03 @ 04:24) (18 - 20)  SpO2: 92% (07-03 @ 04:24) (92% - 95%)  07-01-19 @ 07:01  -  07-02-19 @ 07:00  --------------------------------------------------------  IN: 1740 mL / OUT: 920 mL / NET: 820 mL    07-02-19 @ 07:01  -  07-03-19 @ 05:02  --------------------------------------------------------  IN: 1675 mL / OUT: 1400 mL / NET: 275 mL      General: alert and oriented, NAD  Resp: airway patent, respirations unlabored  CVS: regular rate and rhythm  Abdomen: soft, nontender, nondistended  Extremities: no edema  Skin: warm, dry, appropriate color                          13.5   14.32 )-----------( 637      ( 02 Jul 2019 09:51 )             42.5   07-02    136  |  101  |  24<H>  ----------------------------<  106<H>  3.7   |  23  |  1.85<H>    Ca    8.1<L>      02 Jul 2019 07:08  Phos  2.0     07-02  Mg     2.0     07-02    TPro  5.3<L>  /  Alb  2.8<L>  /  TBili  0.7  /  DBili  x   /  AST  23  /  ALT  53<H>  /  AlkPhos  86  07-02

## 2019-07-03 NOTE — DISCHARGE NOTE PROVIDER - NSDCCPTREATMENT_GEN_ALL_CORE_FT
PRINCIPAL PROCEDURE  Procedure: ERCP  Findings and Treatment:       SECONDARY PROCEDURE  Procedure: ERCP  Findings and Treatment:

## 2019-07-03 NOTE — PROGRESS NOTE ADULT - ASSESSMENT
83M with hx of HTN, DM, BHP, HLD, diverticulitis s/p LAR (2012, Dr. Alberts), incisional hernia s/p repair with mesh (2014, Dr. Hutchinson), presenting with acute cholecystitis and choledocholithiasis. Now with L leg weakness and groin pain likely 2/2 lumbar radiculopathy.    - NPO, IV fluids  - Antibiotics (zosyn)  - ERCP today  - DVT ppx  - Gabapentin for left leg pain  - Appreciate neurology recs    Green team surgery  Pager [phone] 83M with hx of HTN, DM, BHP, HLD, diverticulitis s/p LAR (2012, Dr. Alberts), incisional hernia s/p repair with mesh (2014, Dr. Hutchinson), presenting with acute cholecystitis and choledocholithiasis. S/p ERCP 7/2.    - NPO, IV fluids  - Antibiotics (zosyn)  - ERCP today  - DVT ppx    Green team surgery  Pager [phone] 83M with hx of HTN, DM, BHP, HLD, diverticulitis s/p LAR (2012, Dr. Alberts), incisional hernia s/p repair with mesh (2014, Dr. Hutchinson), presenting with acute cholecystitis and choledocholithiasis. S/p ERCP 7/2.    - Currently on CLD. ADAT.  - Antibiotics (zosyn).   - DVT ppx  - Hemorrhoidal cream prn.   - AM labs  - Pain control    Green team surgery  Pager [phone]

## 2019-07-03 NOTE — DISCHARGE NOTE PROVIDER - NSDCCPCAREPLAN_GEN_ALL_CORE_FT
PRINCIPAL DISCHARGE DIAGNOSIS  Diagnosis: Cholecystitis  Assessment and Plan of Treatment:       SECONDARY DISCHARGE DIAGNOSES  Diagnosis: ANAM (acute kidney injury)  Assessment and Plan of Treatment: PRINCIPAL DISCHARGE DIAGNOSIS  Diagnosis: Cholecystitis  Assessment and Plan of Treatment: Monitor drain output at home  Follow-up with Dr. Hutchinson in two weeks  Follow-up with Interventional radiology in three weeks for removal of drain      SECONDARY DISCHARGE DIAGNOSES  Diagnosis: ANAM (acute kidney injury)  Assessment and Plan of Treatment: PRINCIPAL DISCHARGE DIAGNOSIS  Diagnosis: Cholecystitis  Assessment and Plan of Treatment: Monitor drain output at home  Follow-up with Dr. Hutchinson in two weeks  Follow-up with Interventional radiology in three weeks for removal of drain      SECONDARY DISCHARGE DIAGNOSES  Diagnosis: Sepsis  Assessment and Plan of Treatment: Sepsis was present on admission, secondary to acute cholecystitis which resolved upon perc des tube placement.    Diagnosis: ANAM (acute kidney injury)  Assessment and Plan of Treatment:

## 2019-07-03 NOTE — PROGRESS NOTE ADULT - ATTENDING COMMENTS
As above.    Pt seen/examined on 7/1/19 at 8:30 PM    Impression:    #1.  Acute cholecystitis with improved RUQ pain following percutaneous cholecystostomy tube on 6/30/19  #2.  Choledocholithiasis in a nondilated common bile duct with mildly abnormal LFTs which have also improved following C-tube.  Doubt cholangitis.  #3.  Severe leukocytosis, improving.    Recommendations:    #1.  Continue antibiotics  #2.  NPO after MN for possible ERCP on 7/2/19, although this may be rescheduled as patient is not cholangitic and is improving after C-tube.  #3.  Follow CBC/LFTs.
I saw and examined the pt and discussed the tx plan with the House Staff. I agree with the exam and plan as documented in the surgery resident's note from today.  Pt with fever, high WBC, ongoing abd pain.  CT with gallstones in GB, cystic duct and CBD. GB inflamed and very distended.  Plans for ERCP tomorrow noted, agree.  It is best to obtain quick source control of his infection, given overall picture with high WBC, CT appearance of GB and prior h/o mesh, best approach for the cholecystitis is perc des, will ask IR to perform today - my team placed a call to them this morning.  Massiel Cam MD
Surgery Attending    Pt seen and examined; agree with above assessment and plan    Still w some RUQ pain/ tenderness. No N/V  Cholecystostomy tube w small amt of bilious output    WBC 17,000    Continue anti'bx  Awaiting ERCP today
Surgery Attending    Pt seen and examined; agree with above assessment and plan    S/P ERCP w removal of CBD stones    Pain free  Afebrile  Tolerated PO fluids  Abdominal exam benign    Advance diet  Recheck WBC  Probable discharge home later today on PO Augmentin  F/U ion office in 2 weeks

## 2019-07-03 NOTE — PROVIDER CONTACT NOTE (OTHER) - ACTION/TREATMENT ORDERED:
MD Dalton Stephens will come to bedside to see BM. No intervention required at this time. Will continue to monitor.

## 2019-07-03 NOTE — PROGRESS NOTE ADULT - SUBJECTIVE AND OBJECTIVE BOX
Chief Complaint:  Patient is a 83y old  Male who presents with a chief complaint of Cholecystitis (03 Jul 2019 04:58)      Interval Events: Pt s/p ERCP yesterday. Pt offers no complaints today.   ROS: All 12 point system except listed above were otherwise negative.    Allergies:  Compazine (Other)  fish (Anaphylaxis)  iodine containing compounds (Rash; Short breath)  sulfa drugs (Hives)  zolpidem (Other)        Hospital Medications:  acetaminophen   Tablet .. 650 milliGRAM(s) Oral every 6 hours PRN  allopurinol 300 milliGRAM(s) Oral at bedtime  carvedilol 12.5 milliGRAM(s) Oral every 12 hours  cholecalciferol 1000 Unit(s) Oral two times a day  dextrose 40% Gel 15 Gram(s) Oral once PRN  dextrose 50% Injectable 12.5 Gram(s) IV Push once  dextrose 50% Injectable 25 Gram(s) IV Push once  dextrose 50% Injectable 25 Gram(s) IV Push once  docusate sodium 100 milliGRAM(s) Oral two times a day  finasteride 5 milliGRAM(s) Oral daily  folic acid 1 milliGRAM(s) Oral daily  glucagon  Injectable 1 milliGRAM(s) IntraMuscular once PRN  heparin  Injectable 5000 Unit(s) SubCutaneous every 8 hours  insulin lispro (HumaLOG) corrective regimen sliding scale   SubCutaneous three times a day before meals  insulin lispro (HumaLOG) corrective regimen sliding scale   SubCutaneous at bedtime  montelukast 10 milliGRAM(s) Oral daily  piperacillin/tazobactam IVPB.. 3.375 Gram(s) IV Intermittent every 8 hours  polyethylene glycol 3350 17 Gram(s) Oral daily  senna 2 Tablet(s) Oral at bedtime      PMHX/PSHX:  Restless leg syndrome  Seasonal allergies  BPH (Benign Prostatic Hyperplasia)  Cholelithiasis  Type 2 diabetes mellitus  Diverticulitis  IBS (irritable bowel syndrome)  Darier's disease  MVP (mitral valve prolapse)  PVC's (premature ventricular contractions)  Hyperlipidemia  HTN (hypertension)  Hx of left hemicolectomy  Anal fistula  S/P hernia repair  S/P TURP  S/P tonsillectomy      Family history:    There is no family history of peptic ulcer disease, gastric cancer, colon polyps, colon cancer, celiac disease, biliary, hepatic, or pancreatic disease.  None of the female relatives have breast, uterine, or ovarian cancer.     PHYSICAL EXAM:   Vital Signs:  Vital Signs Last 24 Hrs  T(C): 36.9 (03 Jul 2019 04:24), Max: 36.9 (02 Jul 2019 15:48)  T(F): 98.4 (03 Jul 2019 04:24), Max: 98.5 (03 Jul 2019 00:57)  HR: 67 (03 Jul 2019 07:07) (57 - 73)  BP: 148/69 (03 Jul 2019 07:07) (147/72 - 153/69)  BP(mean): --  RR: 18 (03 Jul 2019 04:24) (18 - 20)  SpO2: 92% (03 Jul 2019 04:24) (92% - 95%)  Daily     Daily     PHYSICAL EXAM:     GENERAL:  Appears stated age, well-groomed, well-nourished, no distress  HEENT:  NC/AT,  conjunctivae clear, sclera -anicteric  CHEST:  Full & symmetric excursion, no increased effort, breath sounds clear  HEART:  Regular rhythm, S1, S2, no murmur/rub/S3/S4,  no edema  ABDOMEN:  Soft, non-tender, non-distended, normoactive bowel sounds,  no masses ,no hepato-splenomegaly,   EXTREMITIES:  no cyanosis,clubbing or edema  SKIN:  No rash/erythema/ecchymoses/petechiae/wounds/abscess/warm/dry  NEURO:  Alert, oriented  LABS:                        13.5   14.32 )-----------( 637      ( 02 Jul 2019 09:51 )             42.5     Mean Cell Volume: 77.6 fl (07-02-19 @ 09:51)    07-02    136  |  101  |  24<H>  ----------------------------<  106<H>  3.7   |  23  |  1.85<H>    Ca    8.1<L>      02 Jul 2019 07:08  Phos  2.0     07-02  Mg     2.0     07-02    TPro  5.3<L>  /  Alb  2.8<L>  /  TBili  0.7  /  DBili  x   /  AST  23  /  ALT  53<H>  /  AlkPhos  86  07-02    LIVER FUNCTIONS - ( 02 Jul 2019 07:08 )  Alb: 2.8 g/dL / Pro: 5.3 g/dL / ALK PHOS: 86 U/L / ALT: 53 U/L / AST: 23 U/L / GGT: x           PT/INR - ( 01 Jul 2019 09:38 )   PT: 14.0 sec;   INR: 1.23 ratio         PTT - ( 01 Jul 2019 09:38 )  PTT:31.3 sec                            13.5   14.32 )-----------( 637      ( 02 Jul 2019 09:51 )             42.5                         13.3   16.37 )-----------( 549      ( 01 Jul 2019 09:40 )             42.1                         15.3   23.4  )-----------( 613      ( 30 Jun 2019 16:18 )             47.6                         13.3   26.47 )-----------( 590      ( 30 Jun 2019 09:18 )             42.1     Imaging:    < from: ERCP (07.02.19 @ 12:20) >                                                                                                        Findings:       Duodenoscope passed to area of major papilla. The bile duct was easily cannulated. There was        evidence of two small stones. Biliary sphincterotomy was made with a traction (standard)        sphincterotome using ERBE electrocautery. There was no post-sphincterotomy bleeding. The        biliary tree was swept with an 11.5 mm balloon starting at the bifurcation. Two stones were        removed. No stones remained. There was mild oozing from the papilla at the apex. A small clip        was placed to prevent any significant post ERCP bleeding.                                                                                                        Impression:          - A sphincterotomy was performed.                       - The biliary tree was swept.             - Choledocholithiasis was found. Complete removal was accomplished by biliary                        sphincterotomy and balloon extraction.  Recommendation:      - Return patient to hospital horn for ongoing care.    < end of copied text >

## 2019-07-04 LAB
CULTURE RESULTS: SIGNIFICANT CHANGE UP
CULTURE RESULTS: SIGNIFICANT CHANGE UP
SPECIMEN SOURCE: SIGNIFICANT CHANGE UP
SPECIMEN SOURCE: SIGNIFICANT CHANGE UP

## 2019-07-05 LAB
CULTURE RESULTS: SIGNIFICANT CHANGE UP
ORGANISM # SPEC MICROSCOPIC CNT: SIGNIFICANT CHANGE UP
SPECIMEN SOURCE: SIGNIFICANT CHANGE UP

## 2019-07-09 ENCOUNTER — EMERGENCY (EMERGENCY)
Facility: HOSPITAL | Age: 84
LOS: 1 days | Discharge: HOME | End: 2019-07-09
Attending: EMERGENCY MEDICINE
Payer: MEDICARE

## 2019-07-09 VITALS
TEMPERATURE: 98 F | WEIGHT: 130.07 LBS | SYSTOLIC BLOOD PRESSURE: 157 MMHG | RESPIRATION RATE: 18 BRPM | HEIGHT: 62 IN | HEART RATE: 80 BPM | OXYGEN SATURATION: 96 % | DIASTOLIC BLOOD PRESSURE: 74 MMHG

## 2019-07-09 PROCEDURE — 99282 EMERGENCY DEPT VISIT SF MDM: CPT

## 2019-07-09 NOTE — ED ADULT TRIAGE NOTE - CHIEF COMPLAINT QUOTE
"something went missing attached to my bag" drainage bag s/p gallbladder surgery. Denies other symptoms.

## 2019-07-09 NOTE — ED PROVIDER NOTE - NSFOLLOWUPINSTRUCTIONS_ED_ALL_ED_FT
Please follow up with your primary physician in 24-48 hr.  Seek immediate medical care for any new/worsening signs or symptoms.  Continue to measure output from the drain using provided cup.

## 2019-07-09 NOTE — ED PROVIDER NOTE - ATTENDING CONTRIBUTION TO CARE
------------ATTENDING NOTE------------  very wonderful gentleman w/ son c/o loosing part of drainage bag, no dislodgement or damage to surgical site or draining system, easily replaced, pt otherwise doing very well post operative, tolerating PO well in ED, soft benign abd, nml VS, in depth dw all about ddx, tx, joyce, continued close outpt fu.  - Wiliam Manuel MD   ----------------------------------------------

## 2019-07-09 NOTE — ED PROVIDER NOTE - OBJECTIVE STATEMENT
83M recent cholecystostomy tube placement presents because he lost the cap to the collection bag. Denies pulling on the tube. Denies dyspnea, cough, chest pain, abdominal pain, n/v/d, change in stool quality.

## 2019-07-09 NOTE — ED PROVIDER NOTE - CLINICAL SUMMARY MEDICAL DECISION MAKING FREE TEXT BOX
83M lost cap to des bag. Replaced cap. Given extra bag. Provided measuring specimen container. No other acute complaints. Abd soft, non tender. Discussed return precautions

## 2019-07-11 ENCOUNTER — APPOINTMENT (OUTPATIENT)
Dept: ORTHOPEDIC SURGERY | Facility: CLINIC | Age: 84
End: 2019-07-11

## 2019-07-17 ENCOUNTER — FORM ENCOUNTER (OUTPATIENT)
Age: 84
End: 2019-07-17

## 2019-07-18 ENCOUNTER — OUTPATIENT (OUTPATIENT)
Dept: OUTPATIENT SERVICES | Facility: HOSPITAL | Age: 84
LOS: 1 days | End: 2019-07-18
Payer: MEDICARE

## 2019-07-18 DIAGNOSIS — K81.9 CHOLECYSTITIS, UNSPECIFIED: ICD-10-CM

## 2019-07-18 PROCEDURE — 47531 INJECTION FOR CHOLANGIOGRAM: CPT

## 2019-07-23 DIAGNOSIS — Z43.4 ENCOUNTER FOR ATTENTION TO OTHER ARTIFICIAL OPENINGS OF DIGESTIVE TRACT: ICD-10-CM

## 2019-07-23 DIAGNOSIS — K80.10 CALCULUS OF GALLBLADDER WITH CHRONIC CHOLECYSTITIS WITHOUT OBSTRUCTION: ICD-10-CM

## 2019-07-26 ENCOUNTER — APPOINTMENT (OUTPATIENT)
Dept: SURGERY | Facility: CLINIC | Age: 84
End: 2019-07-26
Payer: MEDICARE

## 2019-07-26 VITALS
OXYGEN SATURATION: 98 % | DIASTOLIC BLOOD PRESSURE: 79 MMHG | SYSTOLIC BLOOD PRESSURE: 147 MMHG | TEMPERATURE: 98 F | HEART RATE: 80 BPM | RESPIRATION RATE: 16 BRPM

## 2019-07-26 DIAGNOSIS — Z80.0 FAMILY HISTORY OF MALIGNANT NEOPLASM OF DIGESTIVE ORGANS: ICD-10-CM

## 2019-07-26 PROCEDURE — 99214 OFFICE O/P EST MOD 30 MIN: CPT

## 2019-07-26 RX ORDER — FOLIC ACID-PYRIDOXINE-CYANOCOBALAMIN TAB 2.5-25-2 MG 2.5-25-2 MG
TAB ORAL
Refills: 0 | Status: ACTIVE | COMMUNITY

## 2019-07-26 RX ORDER — TAMSULOSIN HYDROCHLORIDE 0.4 MG/1
0.4 CAPSULE ORAL
Refills: 0 | Status: DISCONTINUED | COMMUNITY

## 2019-07-26 RX ORDER — PSYLLIUM SEED
PACKET (EA) ORAL
Refills: 0 | Status: DISCONTINUED | COMMUNITY

## 2019-07-26 RX ORDER — CHLORDIAZEPOXIDE HYDROCHLORIDE AND CLIDINIUM BROMIDE 5; 2.5 MG/1; MG/1
CAPSULE ORAL
Refills: 0 | Status: DISCONTINUED | COMMUNITY

## 2019-07-26 RX ORDER — CHOLECALCIFEROL (VITAMIN D3) 125 MCG
5000 CAPSULE ORAL
Refills: 0 | Status: ACTIVE | COMMUNITY

## 2019-07-26 RX ORDER — CHOLECALCIFEROL (VITAMIN D3) 25 MCG
TABLET ORAL
Refills: 0 | Status: DISCONTINUED | COMMUNITY

## 2019-07-26 RX ORDER — BLOOD SUGAR DIAGNOSTIC
100 STRIP MISCELLANEOUS
Refills: 0 | Status: DISCONTINUED | COMMUNITY

## 2019-07-26 RX ORDER — LORATADINE 5 MG
TABLET,CHEWABLE ORAL
Refills: 0 | Status: DISCONTINUED | COMMUNITY

## 2019-07-26 RX ORDER — EZETIMIBE 10 MG/1
10 TABLET ORAL
Refills: 0 | Status: DISCONTINUED | COMMUNITY

## 2019-07-26 RX ORDER — PITAVASTATIN CALCIUM 1.04 MG/1
1 TABLET, FILM COATED ORAL
Refills: 0 | Status: DISCONTINUED | COMMUNITY

## 2019-07-26 RX ORDER — ASPIRIN 81 MG
81 TABLET, DELAYED RELEASE (ENTERIC COATED) ORAL
Refills: 0 | Status: DISCONTINUED | COMMUNITY

## 2019-07-26 RX ORDER — MONTELUKAST SODIUM 10 MG/1
10 TABLET, FILM COATED ORAL
Refills: 0 | Status: DISCONTINUED | COMMUNITY

## 2019-07-26 NOTE — HISTORY OF PRESENT ILLNESS
[de-identified] : Patient was hospitalized last month with acute cholecystitis and choledocholithiasis. He underwent ERCP with stone extraction and clearance of the CBD and also underwent percutaneous cholecystostomy for treatment of his acute cholecystitis. Since discharge from the hospital 3 weeks ago he has remained afebrile and pain-free. He has been tolerating his diet well and having regular bowel activity. Cholecystostomy tube has been draining relatively small amounts of clear fluid. [de-identified] : Benito is a 82 y/o male here for evaluation of cholecystitis. Patient was admitted to [facility] on 6/29/19 with RUQ abdominal pain, nausea and vomiting. Sepsis was present on admission, secondary to acute cholecystitis with choledocholithiasis. Patient is status post percutaneous cholecystostomy on 6/30 followed by ERCP with sphincterotomy and removal of stones on 7/2.

## 2019-07-26 NOTE — PHYSICAL EXAM
[de-identified] : Anicteric and in no distress [de-identified] : Soft, nondistended, nontender. Cholecystostomy tube in situ in right upper quadrant with scant, clear mucoid fluid in the bag. No local signs of infection.

## 2019-07-26 NOTE — ASSESSMENT
[FreeTextEntry1] : Status post ERCP for common duct stone extraction and percutaneous cholecystostomy for acute cholecystitis with sepsis. Patient now asymptomatic but cholecystostomy tube without significant bile drainage.

## 2019-07-26 NOTE — PLAN
[FreeTextEntry1] : Patient to undergo cholecystostomy tube study and then schedule elective admission for cholecystectomy. Discussed with patient and daughter nature of, indications for and risks/benefits of surgery. Given patient's prior history of colon resection and incisional hernia repairs with mesh x2, as well as previous episode of acute infection, possibility of need for open cholecystectomy also discussed in detail.

## 2019-07-31 ENCOUNTER — FORM ENCOUNTER (OUTPATIENT)
Age: 84
End: 2019-07-31

## 2019-08-01 ENCOUNTER — OUTPATIENT (OUTPATIENT)
Dept: OUTPATIENT SERVICES | Facility: HOSPITAL | Age: 84
LOS: 1 days | End: 2019-08-01
Payer: MEDICARE

## 2019-08-01 DIAGNOSIS — K80.42 CALCULUS OF BILE DUCT WITH ACUTE CHOLECYSTITIS WITHOUT OBSTRUCTION: ICD-10-CM

## 2019-08-01 PROCEDURE — 47536 EXCHANGE BILIARY DRG CATH: CPT

## 2019-08-01 PROCEDURE — C1729: CPT

## 2019-08-01 PROCEDURE — C1769: CPT

## 2019-08-06 DIAGNOSIS — K80.20 CALCULUS OF GALLBLADDER WITHOUT CHOLECYSTITIS WITHOUT OBSTRUCTION: ICD-10-CM

## 2019-08-06 DIAGNOSIS — Z43.4 ENCOUNTER FOR ATTENTION TO OTHER ARTIFICIAL OPENINGS OF DIGESTIVE TRACT: ICD-10-CM

## 2019-08-15 ENCOUNTER — OUTPATIENT (OUTPATIENT)
Dept: OUTPATIENT SERVICES | Facility: HOSPITAL | Age: 84
LOS: 1 days | End: 2019-08-15
Payer: MEDICARE

## 2019-08-15 VITALS
WEIGHT: 125 LBS | RESPIRATION RATE: 15 BRPM | HEART RATE: 85 BPM | SYSTOLIC BLOOD PRESSURE: 126 MMHG | DIASTOLIC BLOOD PRESSURE: 69 MMHG | OXYGEN SATURATION: 97 % | TEMPERATURE: 98 F | HEIGHT: 62 IN

## 2019-08-15 DIAGNOSIS — Z98.890 OTHER SPECIFIED POSTPROCEDURAL STATES: Chronic | ICD-10-CM

## 2019-08-15 DIAGNOSIS — Z96.641 PRESENCE OF RIGHT ARTIFICIAL HIP JOINT: Chronic | ICD-10-CM

## 2019-08-15 DIAGNOSIS — E11.9 TYPE 2 DIABETES MELLITUS WITHOUT COMPLICATIONS: ICD-10-CM

## 2019-08-15 DIAGNOSIS — K80.20 CALCULUS OF GALLBLADDER WITHOUT CHOLECYSTITIS WITHOUT OBSTRUCTION: ICD-10-CM

## 2019-08-15 DIAGNOSIS — I10 ESSENTIAL (PRIMARY) HYPERTENSION: ICD-10-CM

## 2019-08-15 DIAGNOSIS — K80.42 CALCULUS OF BILE DUCT WITH ACUTE CHOLECYSTITIS WITHOUT OBSTRUCTION: ICD-10-CM

## 2019-08-15 LAB
ALBUMIN SERPL ELPH-MCNC: 4.2 G/DL — SIGNIFICANT CHANGE UP (ref 3.3–5)
ALP SERPL-CCNC: 79 U/L — SIGNIFICANT CHANGE UP (ref 40–120)
ALT FLD-CCNC: 13 U/L — SIGNIFICANT CHANGE UP (ref 10–45)
ANION GAP SERPL CALC-SCNC: 13 MMOL/L — SIGNIFICANT CHANGE UP (ref 5–17)
AST SERPL-CCNC: 21 U/L — SIGNIFICANT CHANGE UP (ref 10–40)
BILIRUB SERPL-MCNC: 0.3 MG/DL — SIGNIFICANT CHANGE UP (ref 0.2–1.2)
BUN SERPL-MCNC: 28 MG/DL — HIGH (ref 7–23)
CALCIUM SERPL-MCNC: 8.9 MG/DL — SIGNIFICANT CHANGE UP (ref 8.4–10.5)
CHLORIDE SERPL-SCNC: 104 MMOL/L — SIGNIFICANT CHANGE UP (ref 96–108)
CO2 SERPL-SCNC: 24 MMOL/L — SIGNIFICANT CHANGE UP (ref 22–31)
CREAT SERPL-MCNC: 1.58 MG/DL — HIGH (ref 0.5–1.3)
GLUCOSE SERPL-MCNC: 138 MG/DL — HIGH (ref 70–99)
HBA1C BLD-MCNC: 5.5 % — SIGNIFICANT CHANGE UP (ref 4–5.6)
HCT VFR BLD CALC: 47.8 % — SIGNIFICANT CHANGE UP (ref 39–50)
HGB BLD-MCNC: 14.5 G/DL — SIGNIFICANT CHANGE UP (ref 13–17)
MCHC RBC-ENTMCNC: 24.5 PG — LOW (ref 27–34)
MCHC RBC-ENTMCNC: 30.3 GM/DL — LOW (ref 32–36)
MCV RBC AUTO: 80.7 FL — SIGNIFICANT CHANGE UP (ref 80–100)
PLATELET # BLD AUTO: 536 K/UL — HIGH (ref 150–400)
POTASSIUM SERPL-MCNC: 4.3 MMOL/L — SIGNIFICANT CHANGE UP (ref 3.5–5.3)
POTASSIUM SERPL-SCNC: 4.3 MMOL/L — SIGNIFICANT CHANGE UP (ref 3.5–5.3)
PROT SERPL-MCNC: 6.4 G/DL — SIGNIFICANT CHANGE UP (ref 6–8.3)
RBC # BLD: 5.92 M/UL — HIGH (ref 4.2–5.8)
RBC # FLD: 19.1 % — HIGH (ref 10.3–14.5)
SODIUM SERPL-SCNC: 141 MMOL/L — SIGNIFICANT CHANGE UP (ref 135–145)
WBC # BLD: 11.98 K/UL — HIGH (ref 3.8–10.5)
WBC # FLD AUTO: 11.98 K/UL — HIGH (ref 3.8–10.5)

## 2019-08-15 PROCEDURE — 80053 COMPREHEN METABOLIC PANEL: CPT

## 2019-08-15 PROCEDURE — 83036 HEMOGLOBIN GLYCOSYLATED A1C: CPT

## 2019-08-15 PROCEDURE — G0463: CPT

## 2019-08-15 PROCEDURE — 85027 COMPLETE CBC AUTOMATED: CPT

## 2019-08-15 RX ORDER — SODIUM CHLORIDE 9 MG/ML
3 INJECTION INTRAMUSCULAR; INTRAVENOUS; SUBCUTANEOUS EVERY 8 HOURS
Refills: 0 | Status: DISCONTINUED | OUTPATIENT
Start: 2019-08-27 | End: 2019-08-27

## 2019-08-15 RX ORDER — CHLORHEXIDINE GLUCONATE 213 G/1000ML
1 SOLUTION TOPICAL DAILY
Refills: 0 | Status: DISCONTINUED | OUTPATIENT
Start: 2019-08-27 | End: 2019-08-27

## 2019-08-15 RX ORDER — LIDOCAINE HCL 20 MG/ML
0.2 VIAL (ML) INJECTION ONCE
Refills: 0 | Status: DISCONTINUED | OUTPATIENT
Start: 2019-08-27 | End: 2019-08-27

## 2019-08-15 RX ORDER — CEFAZOLIN SODIUM 1 G
2000 VIAL (EA) INJECTION ONCE
Refills: 0 | Status: DISCONTINUED | OUTPATIENT
Start: 2019-08-27 | End: 2019-08-28

## 2019-08-15 RX ORDER — FLUTICASONE PROPIONATE 50 MCG
1 SPRAY, SUSPENSION NASAL
Qty: 0 | Refills: 0 | DISCHARGE

## 2019-08-15 NOTE — H&P PST ADULT - GENERAL GENITOURINARY SYMPTOMS
- Endocrinology Follow up -    Reason for consult: DM 1 mangement    HPI: Sitting on the chair, asked how she feels, mentioned, today she does not want to talk.  accompanies her.     Current regimens:  Lantus 20 units bedtime  Novolog carb ratio               1:11 Breakfast                1:11 Lunch                1:10 Dinner  Novolog corrections    Glucose past 24 hour: [phone]    Assessment and Plan  72 year old female with DM-1 managed on ambulatory subcutaneous insulin pump at home, CKD IV, factor V Leiden mutation, HTN, HLD, CAD s/p stent, PVD, carotid artery stenosis, asthma, and newly diagnosed lung adenocarcinoma, s/ p right lower lobectomy and middle wedge 5/10/2017.       - Continue current regimens       Lantus 20 units bedtime       Novolog carb ratio               1:11 Breakfast                1:11 Lunch                1:10 Dinner       Novolog corrections    - Will follow up    Katina Knott MD  Staff Physician  Endocrinology and Metabolism  License: [id number]          Past Medical/Surgical History:  Past Medical History:   Diagnosis Date     Adenocarcinoma, lung (H)      Asthma      CAD (coronary artery disease) 9/17/2014 2006 PCI circumflex territory  Mid LAD lesion      Carotid artery stenosis      CKD (chronic kidney disease) stage 4, GFR 15-29 ml/min (H)      Diabetes mellitus type 1 (H)      H/O factor V Leiden mutation      Hypertension      Mixed hyperlipidemia (DYSLIPIDEMIA) 8/1/2014     Osteopenia      Pneumothorax 4/16/2017     PVD (peripheral vascular disease) (H)      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, non malignant     Past Surgical History:   Procedure Laterality Date     BRONCHOSCOPY FLEXIBLE Right 5/10/2017    Procedure: BRONCHOSCOPY FLEXIBLE;  Flexible Bronchoscopy, esaphogastroduodenoscopy, cervical Mediastinoscopy, Right  Thoracoscopic Surgery, right Lower Lobectomy, Mediastinal Lymph Node Dissection, Right middle lobe wedge  resection*Latex Allergy* (ERAS Patient);  Surgeon: Glenna Hall MD;  Location: [facility] OR     ESOPHAGOSCOPY, GASTROSCOPY, DUODENOSCOPY (EGD), COMBINED N/A 5/10/2017    Procedure: COMBINED ESOPHAGOSCOPY, GASTROSCOPY, DUODENOSCOPY (EGD);;  Surgeon: Glenna Hall MD;  Location: [facility] OR     ganglion cyst removal       hemithyroidectomy  4/13/2012    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     IR biopsy of lung  04/2017     lipoma removal       MEDIASTINOSCOPY N/A 5/10/2017    Procedure: MEDIASTINOSCOPY;;  Surgeon: Glenna Hall MD;  Location: [facility] OR     SHOULDER SURGERY Left      THORACOSCOPIC EXCISE NODE MEDIASTINAL Right 5/10/2017    Procedure: THORACOSCOPIC EXCISE NODE MEDIASTINAL;;  Surgeon: Glenna Hall MD;  Location: [facility] OR     THORACOSCOPIC RESECTION LUNG Right 5/10/2017    Procedure: THORACOSCOPIC RESECTION LUNG;;  Surgeon: Glenna Hall MD;  Location: [facility] OR     TONSILLECTOMY         Allergies:  Allergies   Allergen Reactions     Codeine Other (See Comments)     Makes her sleepless     Loratadine Other (See Comments)     Extreme sleepiness       Sulfa Drugs Unknown     Other reaction(s): Unknown     Terazosin Other (See Comments)     Swellling in legs, improved when went off     Adhesive Tape Rash     Foam tape used during surgery was what caused rash     Latex Rash     Liquid Adhesive Rash       Current Medications   Current Facility-Administered Medications   Medication     metoprolol (LOPRESSOR) injection 5 mg     0.9% sodium chloride infusion     pink lady enema (COMPOUNDED: docusate, magnesium citrate, mineral oil, sodium phosphate)     magnesium hydroxide (MILK OF MAGNESIA) suspension 30 mL     magnesium hydroxide (MILK OF MAGNESIA) suspension 30 mL     levofloxacin (LEVAQUIN) tablet 250 mg     diltiazem (CARDIZEM SR) 12 hr capsule 180 mg     oxyCODONE (ROXICODONE) IR tablet 5 mg     heparin  drip 25,000 units in 0.45% NaCl 250 mL (see additional administration details for dose)     heparin bolus  from infusion pump     metoprolol (LOPRESSOR) tablet 25 mg     insulin glargine (LANTUS) injection 20 Units     lidocaine (LIDODERM) 5 % Patch 1 patch    And     lidocaine (LIDODERM) patch REMOVAL    And     lidocaine (LIDODERM) Patch in Place     artificial saliva (BIOTENE DRY MOUTHWASH) liquid 10 mL     lidocaine 1 % 1 mL     lidocaine (LMX4) kit     sodium chloride (PF) 0.9% PF flush 3 mL     sodium chloride (PF) 0.9% PF flush 3 mL     insulin aspart (NovoLOG) inj (RAPID ACTING)     insulin aspart (NovoLOG) inj (RAPID ACTING)     insulin aspart (NovoLOG) inj (RAPID ACTING)     insulin aspart (NovoLOG) inj (RAPID ACTING)     levalbuterol (XOPENEX) neb solution 0.63 mg     sodium chloride 3 % neb solution 3 mL     guaiFENesin (MUCINEX) 12 hr tablet 600 mg     HYDROmorphone (PF) (DILAUDID) injection 0.3-0.5 mg     pink lady enema     insulin aspart (NovoLOG) inj (RAPID ACTING)     insulin aspart (NovoLOG) inj (RAPID ACTING)     bisacodyl (DULCOLAX) Suppository 10 mg     senna-docusate (SENOKOT-S;PERICOLACE) 8.6-50 MG per tablet 2 tablet     polyethylene glycol (MIRALAX/GLYCOLAX) Packet 17 g     acetaminophen (TYLENOL) tablet 975 mg     melatonin tablet 3 mg     calcium carbonate (TUMS) chewable tablet 500 mg     menthol (ICY HOT) 5 % patch 2 patch    And     menthol (ICY HOT) Patch in Place    And     menthol (ICY HOT) patch REMOVAL     aspirin EC EC tablet 81 mg     cetirizine (zyrTEC) tablet 5 mg     pravastatin (PRAVACHOL) tablet 40 mg     glucose 40 % gel 15-30 g    Or     dextrose 50 % injection 25-50 mL    Or     glucagon injection 1 mg     naloxone (NARCAN) injection 0.1-0.4 mg     labetalol (NORMODYNE/TRANDATE) injection 10-40 mg     albuterol (PROAIR HFA/PROVENTIL HFA/VENTOLIN HFA) Inhaler 4 puff     pantoprazole (PROTONIX) EC tablet 40 mg     potassium chloride SA (K-DUR/KLOR-CON M) CR tablet 20-40 mEq     potassium chloride (KLOR-CON) Packet 20-40 mEq     potassium chloride 10 mEq in 100 mL intermittent  "infusion     potassium chloride 10 mEq in 100 mL intermittent infusion with 10 mg lidocaine     potassium chloride 20 mEq in 50 mL intermittent infusion     magnesium sulfate 2 g in NS intermittent infusion (PharMEDium or FV Cmpd)     magnesium sulfate 4 g in 100 mL sterile water (premade)     sodium phosphate 10 mmol in D5W intermittent infusion     sodium phosphate 15 mmol in D5W intermittent infusion     sodium phosphate 20 mmol in D5W intermittent infusion     sodium phosphate 25 mmol in D5W intermittent infusion     acetaminophen (TYLENOL) tablet 650 mg     ondansetron (ZOFRAN-ODT) ODT tab 4 mg    Or     ondansetron (ZOFRAN) injection 4 mg     fluticasone-vilanterol (BREO ELLIPTA) 100-25 MCG/INH oral inhaler 1 puff       Family History:  Family History   Problem Relation Age of Onset     Cancer - colorectal Father      DIABETES Father      Scleroderma Mother      CEREBROVASCULAR DISEASE Brother      DIABETES Brother      Hypertension Brother      DIABETES Brother      2nd brother       Social History:  Social History   Substance Use Topics     Smoking status: Former Smoker     Packs/day: 0.50     Years: 15.00     Types: Cigarettes     Start date: 1/1/1970     Quit date: 12/31/1985     Smokeless tobacco: Never Used     Alcohol use 0.0 - 0.5 oz/week     0 - 1 Standard drinks or equivalent per week         Physical Exam:   Blood pressure 140/43, pulse 79, temperature 97.7  F (36.5  C), temperature source Axillary, resp. rate 18, height 1.626 m (5' 4\"), weight 62.2 kg (137 lb 1.6 oz), SpO2 98 %,  General:  no acute distress, on the chair,   Mental Status/neuro: alert and oriented  Face: symmetrical, normal facial color    Labs (General):   Lab Results   Component Value Date     05/20/2017      Lab Results   Component Value Date    POTASSIUM 4.2 05/20/2017     Lab Results   Component Value Date    CHLORIDE 98 05/20/2017     Lab Results   Component Value Date    ZAHEER 7.3 05/20/2017     Lab Results   Component " Value Date    CO2 20 05/20/2017     Lab Results   Component Value Date    BUN 53 05/20/2017     Lab Results   Component Value Date    CR 2.20 05/20/2017     Lab Results   Component Value Date     05/20/2017     No results found for: TSH  No results found for: T4  Lab Results   Component Value Date    A1C 7.0 05/03/2017              x 2/nocturia

## 2019-08-15 NOTE — H&P PST ADULT - GASTROINTESTINAL COMMENTS
few episodes of abdominal pain a/w nausea, vomiting in June '2019 des tube in place RLQ abdomen- clean dry intact

## 2019-08-15 NOTE — H&P PST ADULT - NSICDXPASTSURGICALHX_GEN_ALL_CORE_FT
PAST SURGICAL HISTORY:  Anal fistula s/p surgery    Hx of left hemicolectomy 2012    S/P ERCP 6/30/19 (S/P cholecystomy tube placement) 7/2/19 remove gallstones    S/P hernia repair bilateral 2013    S/P hip replacement, right 2017    S/P tonsillectomy childhood    S/P TURP x 2

## 2019-08-15 NOTE — H&P PST ADULT - NSICDXPROBLEM_GEN_ALL_CORE_FT
PROBLEM DIAGNOSES  Problem: Cholelithiasis  Assessment and Plan: Scheduled laparoscopic cholecystectomy on 8/27/2019  Pre-op instructions given to pt, including chlorhexidine soap  Lab work sent at [facility]  Pt was instructed by Dr. Hutchinson to stop ASA 1 wk pre-op, last dose 8/20    Problem: HTN (hypertension)  Assessment and Plan: Instructed pt to take BP med in am of sx with sip of water    Problem: T2DM (type 2 diabetes mellitus)  Assessment and Plan: Instructed pt to hold Actos DOS, last dose 8/26 am  Hga1c sent at [facility]  FS DOS

## 2019-08-15 NOTE — H&P PST ADULT - HISTORY OF PRESENT ILLNESS
84 y/o male with PMHx of HTN, DM, BHP, HLD, diverticulitis s/p LAR (2012, Dr. Alberts), incisional hernia s/p repair with mesh (2014, Dr. Hutchinson), presenting with 3 days of RUQ pain, nausea, and emesis. He has never had pain like this in the past. He has eaten very little since the pain began. Denies constipation, diarrhea, or shortness 82 y/o male with PMHx of HTN, T2DM, BPH, HLD, diverticulitis- s/p LAR (2012, Dr. Alberts), incisional hernia- s/p repair with mesh (2014, Dr. Hutchinson), c/o RUQ pain, nausea, and emesis and was admitted to [facility] on 6/29/19. Patient was diagnosed with acute cholecystitis and choledocholithiasis. S/P ERCP - cholecystostomy tube was placed and gallstone extraction. Currently, pt denies N/V/D/C, abdominal pain, SOB, CP, or fever/chills. Evaluated by Dr. Hutchinson. Presents to [facility] for a scheduled laparoscopic cholecystectomy on 8/27/2019. 84 y/o male with PMHx of HTN, T2DM, BPH, HLD, diverticulitis- s/p LAR (2012, Dr. Alberts), incisional hernia- s/p repair with mesh (2014, Dr. Hutchinson), c/o RUQ pain, nausea, and emesis and was admitted to [facility] on 6/29/19. Patient was diagnosed with acute cholecystitis and choledocholithiasis. S/P ERCP - cholecystostomy tube was placed and gallstones were extracted. Currently, pt denies N/V/D/C, abdominal pain, SOB, CP, or fever/chills. Evaluated by Dr. Hutchinson. Presents to [facility] for a scheduled laparoscopic cholecystectomy on 8/27/2019.

## 2019-08-15 NOTE — H&P PST ADULT - ACTIVITY
walks, climbs steps, stationary bike, housework, slightly limited due to des tube walks, climbs steps, stationary bike, moderate activity, slightly limited due to des tube

## 2019-08-15 NOTE — H&P PST ADULT - NSICDXPASTMEDICALHX_GEN_ALL_CORE_FT
PAST MEDICAL HISTORY:  BPH (Benign Prostatic Hyperplasia)     Cholelithiasis     Darier's disease     Diverticulitis     HTN (hypertension)     Hyperlipidemia     IBS (irritable bowel syndrome)     MVP (mitral valve prolapse)     PVC's (premature ventricular contractions)     Restless leg syndrome     Seasonal allergies     Type 2 diabetes mellitus PAST MEDICAL HISTORY:  BPH (Benign Prostatic Hyperplasia)     Cholelithiasis     Darier's disease     Diverticulitis     HTN (hypertension)     Hyperlipidemia     IBS (irritable bowel syndrome)     MVP (mitral valve prolapse) benign, pt reports last echo done ~3 years ago    Restless leg syndrome     Seasonal allergies     Type 2 diabetes mellitus PAST MEDICAL HISTORY:  BPH (Benign Prostatic Hyperplasia)     Cholelithiasis     Darier's disease     Diverticulitis     HTN (hypertension)     Hyperlipidemia     IBS (irritable bowel syndrome)     Insomnia     MVP (mitral valve prolapse) benign, pt reports last echo done ~3 years ago    Restless leg syndrome     Seasonal allergies     Type 2 diabetes mellitus

## 2019-08-15 NOTE — H&P PST ADULT - OCCUPATION
Pt called and scheduled for 05/21/18 with Saira Torrez NP       Please send refill thru to pharmacy  
Refill request received via interface from pharmacy.    Pt was last seen by julia Mcnulty NP 10/28/2017   Pt does not have a pending appointment with PCP/NP/PA  Pt last saw his PCP 12/6/2014     msg routed to PSR to contact pt to schedule a follow up appointment before refill is given.      
Rx was e-scribed to the pharmacy per protocol    
retired

## 2019-08-26 ENCOUNTER — TRANSCRIPTION ENCOUNTER (OUTPATIENT)
Age: 84
End: 2019-08-26

## 2019-08-26 NOTE — PRE-ANESTHESIA EVALUATION ADULT - NSANTHPMHFT_GEN_ALL_CORE
84 y/o male with PMHx of HTN, T2DM, BPH, HLD, diverticulitis- s/p LAR (2012, Dr. Alberts), incisional hernia- s/p repair with mesh (2014, Dr. Hutchinson), c/o RUQ pain, nausea, and emesis and was admitted to [facility] on 6/29/19. Patient was diagnosed with acute cholecystitis and choledocholithiasis. S/P ERCP - cholecystostomy tube was placed and gallstones were extracted. Currently, pt denies N/V/D/C, abdominal pain, SOB, CP, or fever/chills. Evaluated by Dr. Hutchinson. Presents to [facility] for a scheduled laparoscopic cholecystectomy on 8/27/2019.

## 2019-08-27 ENCOUNTER — RESULT REVIEW (OUTPATIENT)
Age: 84
End: 2019-08-27

## 2019-08-27 ENCOUNTER — OUTPATIENT (OUTPATIENT)
Dept: OUTPATIENT SERVICES | Facility: HOSPITAL | Age: 84
LOS: 1 days | End: 2019-08-27
Payer: MEDICARE

## 2019-08-27 ENCOUNTER — APPOINTMENT (OUTPATIENT)
Dept: SURGERY | Facility: HOSPITAL | Age: 84
End: 2019-08-27
Payer: MEDICARE

## 2019-08-27 VITALS
OXYGEN SATURATION: 97 % | TEMPERATURE: 98 F | HEART RATE: 79 BPM | SYSTOLIC BLOOD PRESSURE: 151 MMHG | HEIGHT: 62 IN | RESPIRATION RATE: 18 BRPM | WEIGHT: 125 LBS | DIASTOLIC BLOOD PRESSURE: 83 MMHG

## 2019-08-27 DIAGNOSIS — K80.42 CALCULUS OF BILE DUCT WITH ACUTE CHOLECYSTITIS WITHOUT OBSTRUCTION: ICD-10-CM

## 2019-08-27 DIAGNOSIS — Z96.641 PRESENCE OF RIGHT ARTIFICIAL HIP JOINT: Chronic | ICD-10-CM

## 2019-08-27 DIAGNOSIS — Z98.890 OTHER SPECIFIED POSTPROCEDURAL STATES: Chronic | ICD-10-CM

## 2019-08-27 PROCEDURE — 88304 TISSUE EXAM BY PATHOLOGIST: CPT | Mod: 26

## 2019-08-27 PROCEDURE — 47562 LAPAROSCOPIC CHOLECYSTECTOMY: CPT

## 2019-08-27 RX ORDER — HYDROMORPHONE HYDROCHLORIDE 2 MG/ML
0.25 INJECTION INTRAMUSCULAR; INTRAVENOUS; SUBCUTANEOUS
Refills: 0 | Status: DISCONTINUED | OUTPATIENT
Start: 2019-08-27 | End: 2019-08-28

## 2019-08-27 RX ORDER — ONDANSETRON 8 MG/1
4 TABLET, FILM COATED ORAL ONCE
Refills: 0 | Status: DISCONTINUED | OUTPATIENT
Start: 2019-08-27 | End: 2019-08-28

## 2019-08-27 NOTE — BRIEF OPERATIVE NOTE - OPERATION/FINDINGS
Densely adhered gallbladder to anterior abdominal wall and retrorectal mesh  Percutaneous cholecystostomy tube tract identified and excised

## 2019-08-27 NOTE — BRIEF OPERATIVE NOTE - NSICDXBRIEFPREOP_GEN_ALL_CORE_FT
PRE-OP DIAGNOSIS:  Cholelithiasis with chronic cholecystitis without biliary obstruction 27-Aug-2019 20:13:11  Alex Burnett

## 2019-08-27 NOTE — BRIEF OPERATIVE NOTE - NSICDXBRIEFPOSTOP_GEN_ALL_CORE_FT
POST-OP DIAGNOSIS:  Cholelithiasis with chronic cholecystitis without biliary obstruction 27-Aug-2019 20:12:42  Alex Burnett

## 2019-08-27 NOTE — ASU DISCHARGE PLAN (ADULT/PEDIATRIC) - CALL YOUR DOCTOR IF YOU HAVE ANY OF THE FOLLOWING:
Bleeding that does not stop/Pain not relieved by Medications/Nausea and vomiting that does not stop/Fever greater than (need to indicate Fahrenheit or Celsius)

## 2019-08-27 NOTE — CHART NOTE - NSCHARTNOTEFT_GEN_A_CORE
POST-OPERATIVE NOTE    Subjective:  Patient is s/p laparscopic cholecystectomy, recovering appropriately. He reports minimal pain since tylenol administration 5 hours ago, denies nausea, and denies vomiting. He has not passed gas or had a bowel movement and has not yet voided. He has a history of BPH. He does not want percoset as it makes him do "loopy things" like waking up and wandering around. He thinks he can control the pain with tylenol alone.     Vital Signs Last 24 Hrs  T(C): 36 (27 Aug 2019 22:00), Max: 36.4 (27 Aug 2019 13:37)  T(F): 96.8 (27 Aug 2019 22:00), Max: 97.5 (27 Aug 2019 13:37)  HR: 96 (27 Aug 2019 22:00) (79 - 96)  BP: 151/71 (27 Aug 2019 22:00) (124/61 - 151/83)  BP(mean): 100 (27 Aug 2019 22:00) (87 - 111)  RR: 12 (27 Aug 2019 22:00) (11 - 18)  SpO2: 98% (27 Aug 2019 22:00) (91% - 98%)  I&O's Detail    27 Aug 2019 07:01  -  27 Aug 2019 23:12  --------------------------------------------------------  IN:    Oral Fluid: 250 mL  Total IN: 250 mL    OUT:  Total OUT: 0 mL    Total NET: 250 mL        ceFAZolin   IVPB 2000  ceFAZolin   IVPB 2000    PAST MEDICAL & SURGICAL HISTORY:  Insomnia  Restless leg syndrome  Seasonal allergies  BPH (Benign Prostatic Hyperplasia)  Cholelithiasis  Type 2 diabetes mellitus  Diverticulitis  IBS (irritable bowel syndrome)  Darier's disease  MVP (mitral valve prolapse): benign, pt reports last echo done ~3 years ago  Hyperlipidemia  HTN (hypertension)  S/P ERCP: 6/30/19 (S/P cholecystomy tube placement) 7/2/19 remove gallstones  S/P hip replacement, right: 2017  Hx of left hemicolectomy: 2012  Anal fistula: s/p surgery  S/P hernia repair: bilateral 2013  S/P TURP: x 2  S/P tonsillectomy: childhood        Physical Exam:  General: NAD, resting comfortably in bed  Pulmonary: Nonlabored breathing, no respiratory distress  Cardiovascular: NSR, no murmurs or rubs  Abdominal: soft, appropriately tender, nondistended. multiple laparoscopic incisions CDI  Extremities: WWP      Assessment:  The patient is a 83y Male who is now several hours post-op from a laparoscopic cholecystectomy, currently awaiting TOV.    Plan:  -d/c home if urinates independently  - Tylenol for pain control. Follow up outpatient for new outpatient pain meds.  - OOB and ambulating as tolerated

## 2019-08-28 ENCOUNTER — TRANSCRIPTION ENCOUNTER (OUTPATIENT)
Age: 84
End: 2019-08-28

## 2019-08-28 VITALS
RESPIRATION RATE: 15 BRPM | HEART RATE: 82 BPM | SYSTOLIC BLOOD PRESSURE: 121 MMHG | DIASTOLIC BLOOD PRESSURE: 61 MMHG | OXYGEN SATURATION: 97 %

## 2019-08-28 PROBLEM — G47.00 INSOMNIA, UNSPECIFIED: Chronic | Status: ACTIVE | Noted: 2019-08-15

## 2019-08-28 PROCEDURE — 82962 GLUCOSE BLOOD TEST: CPT

## 2019-08-28 PROCEDURE — 47562 LAPAROSCOPIC CHOLECYSTECTOMY: CPT

## 2019-08-28 PROCEDURE — 88304 TISSUE EXAM BY PATHOLOGIST: CPT

## 2019-08-28 PROCEDURE — C1889: CPT

## 2019-08-28 RX ORDER — TAMSULOSIN HYDROCHLORIDE 0.4 MG/1
0.4 CAPSULE ORAL DAILY
Refills: 0 | Status: DISCONTINUED | OUTPATIENT
Start: 2019-08-28 | End: 2019-09-11

## 2019-08-28 RX ORDER — FINASTERIDE 5 MG/1
5 TABLET, FILM COATED ORAL DAILY
Refills: 0 | Status: DISCONTINUED | OUTPATIENT
Start: 2019-08-28 | End: 2019-09-11

## 2019-08-28 RX ADMIN — TAMSULOSIN HYDROCHLORIDE 0.4 MILLIGRAM(S): 0.4 CAPSULE ORAL at 07:41

## 2019-08-28 NOTE — PROGRESS NOTE ADULT - ATTENDING COMMENTS
Surgery Attending    Pt seen and examined; agree with above assessment and plan    If pt unable to void, plan for discharge with maldonado to leg bag and f/u with urology as outpatient

## 2019-08-28 NOTE — DISCHARGE NOTE PROVIDER - HOSPITAL COURSE
Mr. Parham is an 82yo male with history of BPH who ambulated into [facility] for a laparoscopic cholecystectomy after previously receiving a percutaneous cholecystostomy tube. He remained in the PACU overnight for concerns with urinary retention. He notes that he typically has this issue after an operation and that he follows up with his Urologist, Dr. Davey. It was discussed with him that we will place a maldonado with a leg bag should he not be able to void this am. He is amenable to the plan and states that he has been discharged in this manner previously. Mr. Parham is an 82yo male with history of BPH who ambulated into [facility] for a laparoscopic cholecystectomy after previously receiving a percutaneous cholecystostomy tube. He remained in the PACU overnight for concerns with urinary retention. He failed a trial of void resulting in straight catheterization. He notes that he typically has this issue after an operation and that he follows up with his Urologist, Dr. Davey. It was discussed with him that we will place a maldonado with a leg bag should he not be able to void this am. He is amenable to the plan and states that he has been discharged in this manner previously. Mr. Parham is an 82yo male with history of BPH who ambulated into [facility] for a laparoscopic cholecystectomy after previously receiving a percutaneous cholecystostomy tube. He remained in the PACU overnight for concerns with urinary retention. He failed a trial of void resulting in straight catheterization. He notes that he typically has this issue after an operation and that he follows up with his Urologist, Dr. Davey. It was discussed with him that we will place a maldonado with a leg bag should he not be able to void this am. He is amenable to the plan and states that he has been discharged in this manner previously.         On day of discharge, the patient was tolerating diet, ambulating well and pain controlled. The patient will be discharged home with outpatient follow up with Dr. Hutchinson and his urologist within 1-2 weeks.

## 2019-08-28 NOTE — DISCHARGE NOTE PROVIDER - NSDCCPCAREPLAN_GEN_ALL_CORE_FT
PRINCIPAL DISCHARGE DIAGNOSIS  Diagnosis: Cholelithiasis with chronic cholecystitis without biliary obstruction  Assessment and Plan of Treatment:       SECONDARY DISCHARGE DIAGNOSES  Diagnosis: Cholelithiasis with chronic cholecystitis without biliary obstruction  Assessment and Plan of Treatment: PRINCIPAL DISCHARGE DIAGNOSIS  Diagnosis: Cholelithiasis with chronic cholecystitis without biliary obstruction  Assessment and Plan of Treatment: You may shower. Pat Dry abdomen. Leave the white steri strips in place, they will fall off on their own in approximately 5-7 days.   BATHING: Please do not submerge wound underwater. You may shower and/or sponge bathe.  ACTIVITY: No heavy lifting anything more than 10-15lbs or straining. Otherwise, you may return to your usual level of physical activity. If you are taking narcotic pain medication (such as Percocet), do NOT drive a car, operate machinery or make important decisions.  DIET: Low fiber diet  NOTIFY YOUR SURGEON IF: You have any bleeding that does not stop, any pus draining from your wound, any fever (over 100.4 F) or chills, persistent nausea/vomiting with inability to tolerate food or liquids, persistent diarrhea, or if your pain is not controlled on your discharge pain medications.  FOLLOW-UP:  1. Please call to make a follow-up appointment within one week of discharge   2. Please follow up with your primary care physician in one week regarding your hospitalization.      SECONDARY DISCHARGE DIAGNOSES  Diagnosis: BPH with urinary obstruction  Assessment and Plan of Treatment: You will be discharged with a maldonado catheter.  Please follow up with your urologist within 1 week for removal.

## 2019-08-28 NOTE — DISCHARGE NOTE PROVIDER - DISCHARGE DATE
28-Aug-2019 W Plasty Text: The lesion was extirpated to the level of the fat with a #15 scalpel blade.  Given the location of the defect, shape of the defect and the proximity to free margins a W-plasty was deemed most appropriate for repair.  Using a sterile surgical marker, the appropriate transposition arms of the W-plasty were drawn incorporating the defect and placing the expected incisions within the relaxed skin tension lines where possible.    The area thus outlined was incised deep to adipose tissue with a #15 scalpel blade.  The skin margins were undermined to an appropriate distance in all directions utilizing iris scissors.  The opposing transposition arms were then transposed into place in opposite direction and anchored with interrupted buried subcutaneous sutures.

## 2019-08-28 NOTE — DISCHARGE NOTE PROVIDER - CARE PROVIDER_API CALL
Rogerio Hutchinson)  Surgery  [address], [address]  [place], [place] [id number]  Phone: [phone]  Fax: [phone]  Follow Up Time: 2 weeks

## 2019-08-28 NOTE — PROGRESS NOTE ADULT - SUBJECTIVE AND OBJECTIVE BOX
POD #: 1    No acute events overnight. Inability to void consistently.   Patient voided 2x 150cc, bladder scan 500.   Patient voided 2 more x200cc, Bladder scan  682  Patient straight catheterized for 730   Patient voided additional times, Bladder scan 800,   Patient voided 150cc,     SUBJECTIVE:  Reports no pain  Denies nausea, vomiting, diarrhea  Is passing gas and having bowel movements  Tolerating diet  Ambulating independently    OBJECTIVE:  Vital Signs Last 24 Hrs  T(C): 36 (27 Aug 2019 22:00), Max: 36.4 (27 Aug 2019 13:37)  T(F): 96.8 (27 Aug 2019 22:00), Max: 97.5 (27 Aug 2019 13:37)  HR: 83 (28 Aug 2019 03:00) (79 - 97)  BP: 147/78 (28 Aug 2019 03:00) (124/61 - 151/83)  BP(mean): 102 (28 Aug 2019 03:00) (87 - 111)  RR: 16 (28 Aug 2019 03:00) (11 - 18)  SpO2: 99% (28 Aug 2019 03:00) (91% - 100%)    I&O's Detail    27 Aug 2019 07:01  -  28 Aug 2019 03:43  --------------------------------------------------------  IN:    Oral Fluid: 250 mL  Total IN: 250 mL    OUT:    Voided: 850 mL  Total OUT: 850 mL    Total NET: -600 mL          Inpatient Medications:  MEDICATIONS  (STANDING):  ceFAZolin   IVPB 2000 milliGRAM(s) IV Intermittent once    MEDICATIONS  (PRN):  HYDROmorphone  Injectable 0.25 milliGRAM(s) IV Push every 10 minutes PRN Moderate Pain (4 - 6)  ondansetron Injectable 4 milliGRAM(s) IV Push once PRN Nausea and/or Vomiting      Physical Exam:  General: NAD, resting comfortably in bed  Pulmonary: Nonlabored breathing, no respiratory distress  Cardiovascular: NSR, no murmurs or rubs  Abdominal: soft, appropriately tender, nondistended. multiple laparoscopic incisions CDI  Extremities: WWP      CULTURES:  .Blood  06-30 @ 23:56   No growth at 5 days.  --  --      .Body Fluid  06-30 @ 16:47   Moderate Klebsiella oxytoca  Numerous Enterococcus faecalis  Numerous Escherichia coli  --  Klebsiella oxytoca  Enterococcus faecalis  Escherichia coli      .Blood  06-29 @ 21:35   No growth at 5 days.  --  --    Assessment:  The patient is a 83y Male who is now POD 1 s/p laparoscopic cholecystectomy, post operative course complicated by inability to urinate consistently. We will watch overnight and address in the morning due to considerable urine production on straight caths    Plan:  -d/c home if urinates independently  - restart home flomax  - Tylenol for pain control. Follow up outpatient for new outpatient pain meds.  - OOB and ambulating as tolerated.

## 2019-08-29 LAB — GLUCOSE BLDC GLUCOMTR-MCNC: 81 MG/DL — SIGNIFICANT CHANGE UP (ref 70–99)

## 2019-09-05 LAB — SURGICAL PATHOLOGY STUDY: SIGNIFICANT CHANGE UP

## 2019-09-20 ENCOUNTER — APPOINTMENT (OUTPATIENT)
Dept: SURGERY | Facility: CLINIC | Age: 84
End: 2019-09-20
Payer: MEDICARE

## 2019-09-20 VITALS
HEART RATE: 80 BPM | DIASTOLIC BLOOD PRESSURE: 89 MMHG | RESPIRATION RATE: 15 BRPM | OXYGEN SATURATION: 98 % | SYSTOLIC BLOOD PRESSURE: 163 MMHG | TEMPERATURE: 97.5 F

## 2019-09-20 DIAGNOSIS — K80.42 CALCULUS OF BILE DUCT WITH ACUTE CHOLECYSTITIS W/OUT OBSTRUCTION: ICD-10-CM

## 2019-09-20 DIAGNOSIS — Z09 ENCOUNTER FOR FOLLOW-UP EXAMINATION AFTER COMPLETED TREATMENT FOR CONDITIONS OTHER THAN MALIGNANT NEOPLASM: ICD-10-CM

## 2019-09-20 PROCEDURE — 99024 POSTOP FOLLOW-UP VISIT: CPT

## 2019-09-20 RX ORDER — OXYCODONE AND ACETAMINOPHEN 5; 325 MG/1; MG/1
5-325 TABLET ORAL
Qty: 10 | Refills: 0 | Status: DISCONTINUED | COMMUNITY
Start: 2019-08-27 | End: 2019-09-20

## 2019-09-20 RX ORDER — MONTELUKAST SODIUM 10 MG/1
10 TABLET, FILM COATED ORAL
Refills: 0 | Status: DISCONTINUED | COMMUNITY
End: 2019-09-20

## 2019-09-20 NOTE — HISTORY OF PRESENT ILLNESS
[de-identified] : Benito is an 84 y/o male here for a 08/27/2019 post-op visit, s/p laparoscopic cholecystectomy. Pathology: acute and chronic cholecystitis. Cholelithiasis. [de-identified] : Status post laparoscopic cholecystectomy on 8/27/19. At this point has no complaint of pain. Tolerating diet well and having regular bowel activity. Required a Suarez catheter postop but since then the catheter has been discontinued and patient is voiding without difficulty.

## 2019-09-20 NOTE — ASSESSMENT
[FreeTextEntry1] : Status post laparoscopic cholecystectomy with postop urinary retention, now resolved.

## 2019-10-11 NOTE — PRE-OP CHECKLIST - NEEDLE GAUGE
Chief Complaints and History of Present Illnesses   Patient presents with     Annual Eye Exam     Chief Complaint(s) and History of Present Illness(es)     Annual Eye Exam     Laterality: both eyes    Onset: unknown    Onset: 6 months ago    Quality: blurred vision    Timing: at random times and throughout the day    Associated symptoms: eye pain (LE), floaters (RE) and photophobia.  Negative for flashes    Treatments tried: no treatments    Pain scale: 8/10              Comments      pain LE x 6 months at times had to go to ER  Using pain meds   Mabel BAL 12:47 PM October 11, 2019                   
20

## 2020-10-24 ENCOUNTER — TRANSCRIPTION ENCOUNTER (OUTPATIENT)
Age: 85
End: 2020-10-24

## 2021-05-26 NOTE — ED ADULT NURSE NOTE - OBJECTIVE STATEMENT
152.4 83y male with hx of recent gallbladder sx with drainage bag presents to the ER c/o missing cap. pt is alert and oriented x 3 and speaking coherently. pt states he noticed something missing off of his drainage bag. pt appears to have cap missing off drainage bag. pt denies cp, sob, n/v/d, fevers, pain associated with gallbladder sx site. family at the bedside. will reassess.

## 2022-05-18 ENCOUNTER — NON-APPOINTMENT (OUTPATIENT)
Age: 87
End: 2022-05-18

## 2022-10-31 ENCOUNTER — APPOINTMENT (OUTPATIENT)
Dept: ORTHOPEDIC SURGERY | Facility: CLINIC | Age: 87
End: 2022-10-31

## 2022-10-31 VITALS — HEIGHT: 62 IN | BODY MASS INDEX: 25.76 KG/M2 | WEIGHT: 140 LBS

## 2022-10-31 DIAGNOSIS — S93.602A UNSPECIFIED SPRAIN OF LEFT FOOT, INITIAL ENCOUNTER: ICD-10-CM

## 2022-10-31 DIAGNOSIS — M79.672 PAIN IN RIGHT FOOT: ICD-10-CM

## 2022-10-31 DIAGNOSIS — M79.671 PAIN IN RIGHT FOOT: ICD-10-CM

## 2022-10-31 PROCEDURE — 99203 OFFICE O/P NEW LOW 30 MIN: CPT

## 2022-10-31 PROCEDURE — 73630 X-RAY EXAM OF FOOT: CPT | Mod: 50

## 2022-10-31 NOTE — HISTORY OF PRESENT ILLNESS
[0] : 0 [de-identified] : 10/31/22: fall 1.5 wks ago w/ foot pain. pain now improving. no prior foot probs. no tx to date. +dm (a1c 5.3). no tob [] : Post Surgical Visit: no [FreeTextEntry1] : ELLIOT feet

## 2022-10-31 NOTE — PHYSICAL EXAM
[Left] : left foot and ankle [Mild] : mild swelling of dorsal foot [5___] : eversion 5[unfilled]/5 [2+] : dorsalis pedis pulse: 2+ [] : patient ambulates without assistive device [FreeTextEntry8] : no point ttp

## 2023-02-25 ENCOUNTER — INPATIENT (INPATIENT)
Facility: HOSPITAL | Age: 88
LOS: 3 days | Discharge: HOME HEALTH | DRG: 91 | End: 2023-03-01
Attending: STUDENT IN AN ORGANIZED HEALTH CARE EDUCATION/TRAINING PROGRAM | Admitting: STUDENT IN AN ORGANIZED HEALTH CARE EDUCATION/TRAINING PROGRAM
Payer: MEDICARE

## 2023-02-25 VITALS
RESPIRATION RATE: 19 BRPM | TEMPERATURE: 98 F | OXYGEN SATURATION: 95 % | DIASTOLIC BLOOD PRESSURE: 65 MMHG | HEART RATE: 86 BPM | SYSTOLIC BLOOD PRESSURE: 126 MMHG | HEIGHT: 62 IN | WEIGHT: 141.1 LBS

## 2023-02-25 DIAGNOSIS — Z96.641 PRESENCE OF RIGHT ARTIFICIAL HIP JOINT: Chronic | ICD-10-CM

## 2023-02-25 DIAGNOSIS — N17.9 ACUTE KIDNEY FAILURE, UNSPECIFIED: ICD-10-CM

## 2023-02-25 DIAGNOSIS — Z98.890 OTHER SPECIFIED POSTPROCEDURAL STATES: Chronic | ICD-10-CM

## 2023-02-25 LAB
ALBUMIN SERPL ELPH-MCNC: 4.3 G/DL — SIGNIFICANT CHANGE UP (ref 3.3–5)
ALP SERPL-CCNC: 72 U/L — SIGNIFICANT CHANGE UP (ref 40–120)
ALT FLD-CCNC: 31 U/L — SIGNIFICANT CHANGE UP (ref 10–45)
ANION GAP SERPL CALC-SCNC: 15 MMOL/L — SIGNIFICANT CHANGE UP (ref 5–17)
ANISOCYTOSIS BLD QL: SLIGHT — SIGNIFICANT CHANGE UP
APPEARANCE UR: CLEAR — SIGNIFICANT CHANGE UP
APTT BLD: 30.9 SEC — SIGNIFICANT CHANGE UP (ref 27.5–35.5)
AST SERPL-CCNC: 77 U/L — HIGH (ref 10–40)
BASE EXCESS BLDV CALC-SCNC: -1.9 MMOL/L — SIGNIFICANT CHANGE UP (ref -2–3)
BASOPHILS # BLD AUTO: 0.59 K/UL — HIGH (ref 0–0.2)
BASOPHILS NFR BLD AUTO: 4.2 % — HIGH (ref 0–2)
BILIRUB SERPL-MCNC: 0.7 MG/DL — SIGNIFICANT CHANGE UP (ref 0.2–1.2)
BILIRUB UR-MCNC: NEGATIVE — SIGNIFICANT CHANGE UP
BLD GP AB SCN SERPL QL: NEGATIVE — SIGNIFICANT CHANGE UP
BUN SERPL-MCNC: 56 MG/DL — HIGH (ref 7–23)
CA-I SERPL-SCNC: 1.67 MMOL/L — CRITICAL HIGH (ref 1.15–1.33)
CALCIUM SERPL-MCNC: 12.9 MG/DL — HIGH (ref 8.4–10.5)
CHLORIDE BLDV-SCNC: 105 MMOL/L — SIGNIFICANT CHANGE UP (ref 96–108)
CHLORIDE SERPL-SCNC: 106 MMOL/L — SIGNIFICANT CHANGE UP (ref 96–108)
CO2 BLDV-SCNC: 26 MMOL/L — SIGNIFICANT CHANGE UP (ref 22–26)
CO2 SERPL-SCNC: 22 MMOL/L — SIGNIFICANT CHANGE UP (ref 22–31)
COLOR SPEC: SIGNIFICANT CHANGE UP
CREAT ?TM UR-MCNC: 78 MG/DL — SIGNIFICANT CHANGE UP
CREAT SERPL-MCNC: 3.4 MG/DL — HIGH (ref 0.5–1.3)
DIFF PNL FLD: ABNORMAL
EGFR: 17 ML/MIN/1.73M2 — LOW
EOSINOPHIL # BLD AUTO: 0 K/UL — SIGNIFICANT CHANGE UP (ref 0–0.5)
EOSINOPHIL NFR BLD AUTO: 0 % — SIGNIFICANT CHANGE UP (ref 0–6)
FLUAV AG NPH QL: SIGNIFICANT CHANGE UP
FLUBV AG NPH QL: SIGNIFICANT CHANGE UP
GAS PNL BLDV: 137 MMOL/L — SIGNIFICANT CHANGE UP (ref 136–145)
GAS PNL BLDV: SIGNIFICANT CHANGE UP
GAS PNL BLDV: SIGNIFICANT CHANGE UP
GLUCOSE BLDV-MCNC: 91 MG/DL — SIGNIFICANT CHANGE UP (ref 70–99)
GLUCOSE SERPL-MCNC: 110 MG/DL — HIGH (ref 70–99)
GLUCOSE UR QL: ABNORMAL
HCO3 BLDV-SCNC: 25 MMOL/L — SIGNIFICANT CHANGE UP (ref 22–29)
HCT VFR BLD CALC: 48.9 % — SIGNIFICANT CHANGE UP (ref 39–50)
HCT VFR BLDA CALC: 47 % — SIGNIFICANT CHANGE UP (ref 39–51)
HGB BLD CALC-MCNC: 15.5 G/DL — SIGNIFICANT CHANGE UP (ref 12.6–17.4)
HGB BLD-MCNC: 15.9 G/DL — SIGNIFICANT CHANGE UP (ref 13–17)
INR BLD: 0.98 RATIO — SIGNIFICANT CHANGE UP (ref 0.88–1.16)
KETONES UR-MCNC: SIGNIFICANT CHANGE UP
LACTATE BLDV-MCNC: 0.9 MMOL/L — SIGNIFICANT CHANGE UP (ref 0.5–2)
LEUKOCYTE ESTERASE UR-ACNC: NEGATIVE — SIGNIFICANT CHANGE UP
LYMPHOCYTES # BLD AUTO: 1.3 K/UL — SIGNIFICANT CHANGE UP (ref 1–3.3)
LYMPHOCYTES # BLD AUTO: 9.2 % — LOW (ref 13–44)
MACROCYTES BLD QL: SIGNIFICANT CHANGE UP
MANUAL SMEAR VERIFICATION: SIGNIFICANT CHANGE UP
MCHC RBC-ENTMCNC: 32.5 GM/DL — SIGNIFICANT CHANGE UP (ref 32–36)
MCHC RBC-ENTMCNC: 34.9 PG — HIGH (ref 27–34)
MCV RBC AUTO: 107.2 FL — HIGH (ref 80–100)
MONOCYTES # BLD AUTO: 1.88 K/UL — HIGH (ref 0–0.9)
MONOCYTES NFR BLD AUTO: 13.3 % — SIGNIFICANT CHANGE UP (ref 2–14)
NEUTROPHILS # BLD AUTO: 10.36 K/UL — HIGH (ref 1.8–7.4)
NEUTROPHILS NFR BLD AUTO: 73.3 % — SIGNIFICANT CHANGE UP (ref 43–77)
NITRITE UR-MCNC: NEGATIVE — SIGNIFICANT CHANGE UP
OSMOLALITY UR: 466 MOS/KG — SIGNIFICANT CHANGE UP (ref 300–900)
PCO2 BLDV: 48 MMHG — SIGNIFICANT CHANGE UP (ref 42–55)
PH BLDV: 7.32 — SIGNIFICANT CHANGE UP (ref 7.32–7.43)
PH UR: 6 — SIGNIFICANT CHANGE UP (ref 5–8)
PLAT MORPH BLD: NORMAL — SIGNIFICANT CHANGE UP
PLATELET # BLD AUTO: 320 K/UL — SIGNIFICANT CHANGE UP (ref 150–400)
PO2 BLDV: 54 MMHG — HIGH (ref 25–45)
POLYCHROMASIA BLD QL SMEAR: SLIGHT — SIGNIFICANT CHANGE UP
POTASSIUM BLDV-SCNC: 3.8 MMOL/L — SIGNIFICANT CHANGE UP (ref 3.5–5.1)
POTASSIUM SERPL-MCNC: 4.5 MMOL/L — SIGNIFICANT CHANGE UP (ref 3.5–5.3)
POTASSIUM SERPL-SCNC: 4.5 MMOL/L — SIGNIFICANT CHANGE UP (ref 3.5–5.3)
POTASSIUM UR-SCNC: 55 MMOL/L — SIGNIFICANT CHANGE UP
PROT ?TM UR-MCNC: 111 MG/DL — HIGH (ref 0–12)
PROT SERPL-MCNC: 6.4 G/DL — SIGNIFICANT CHANGE UP (ref 6–8.3)
PROT UR-MCNC: ABNORMAL
PROT/CREAT UR-RTO: 1.4 RATIO — HIGH (ref 0–0.2)
PROTHROM AB SERPL-ACNC: 11.4 SEC — SIGNIFICANT CHANGE UP (ref 10.5–13.4)
RBC # BLD: 4.56 M/UL — SIGNIFICANT CHANGE UP (ref 4.2–5.8)
RBC # FLD: 14.8 % — HIGH (ref 10.3–14.5)
RBC BLD AUTO: ABNORMAL
RH IG SCN BLD-IMP: NEGATIVE — SIGNIFICANT CHANGE UP
RSV RNA NPH QL NAA+NON-PROBE: SIGNIFICANT CHANGE UP
SAO2 % BLDV: 86.1 % — SIGNIFICANT CHANGE UP (ref 67–88)
SARS-COV-2 RNA SPEC QL NAA+PROBE: SIGNIFICANT CHANGE UP
SODIUM SERPL-SCNC: 143 MMOL/L — SIGNIFICANT CHANGE UP (ref 135–145)
SODIUM UR-SCNC: 68 MMOL/L — SIGNIFICANT CHANGE UP
SP GR SPEC: 1.02 — SIGNIFICANT CHANGE UP (ref 1.01–1.02)
UROBILINOGEN FLD QL: NEGATIVE — SIGNIFICANT CHANGE UP
WBC # BLD: 14.13 K/UL — HIGH (ref 3.8–10.5)
WBC # FLD AUTO: 14.13 K/UL — HIGH (ref 3.8–10.5)

## 2023-02-25 PROCEDURE — 72125 CT NECK SPINE W/O DYE: CPT | Mod: 26,MA

## 2023-02-25 PROCEDURE — ZZZZZ: CPT

## 2023-02-25 PROCEDURE — 71045 X-RAY EXAM CHEST 1 VIEW: CPT | Mod: 26

## 2023-02-25 PROCEDURE — 72170 X-RAY EXAM OF PELVIS: CPT | Mod: 26

## 2023-02-25 PROCEDURE — 99285 EMERGENCY DEPT VISIT HI MDM: CPT | Mod: GC

## 2023-02-25 PROCEDURE — 70450 CT HEAD/BRAIN W/O DYE: CPT | Mod: 26,MA

## 2023-02-25 PROCEDURE — 74176 CT ABD & PELVIS W/O CONTRAST: CPT | Mod: 26,MA

## 2023-02-25 RX ORDER — SODIUM CHLORIDE 9 MG/ML
500 INJECTION INTRAMUSCULAR; INTRAVENOUS; SUBCUTANEOUS ONCE
Refills: 0 | Status: COMPLETED | OUTPATIENT
Start: 2023-02-25 | End: 2023-02-25

## 2023-02-25 RX ADMIN — SODIUM CHLORIDE 500 MILLILITER(S): 9 INJECTION INTRAMUSCULAR; INTRAVENOUS; SUBCUTANEOUS at 17:40

## 2023-02-25 NOTE — ED PROVIDER NOTE - PHYSICAL EXAMINATION
Gen: Patient is well-appearing, NAD, AAOx3  HEENT: NCAT, EOMI, PERRLA, normal conjunctiva, tongue midline, oral mucosa moist, neck is supple, normal ROM  Lung: CTAB, no respiratory distress, no wheezes/rhonchi/rales B/L, speaking in full sentences  CV: RRR, no murmurs, rubs or gallops, distal pulses 2+ b/l  Abd: soft, NT, ND, no guarding, no rigidity, no rebound tenderness, no CVA tenderness   MSK: no visible deformities, ROM normal in UE/LE, no back TTP  Neuro: No focal sensory or motor deficits, normal CN exam, normal coordination   Skin: Warm, well perfused, no rash, no leg swelling  Psych: normal affect, calm

## 2023-02-25 NOTE — ED PROVIDER NOTE - NSICDXPASTMEDICALHX_GEN_ALL_CORE_FT
PAST MEDICAL HISTORY:  BPH (Benign Prostatic Hyperplasia)     Cholelithiasis     Darier's disease     Diverticulitis     HTN (hypertension)     Hyperlipidemia     IBS (irritable bowel syndrome)     Insomnia     MVP (mitral valve prolapse) benign, pt reports last echo done ~3 years ago    Restless leg syndrome     Seasonal allergies     Type 2 diabetes mellitus

## 2023-02-25 NOTE — ED PROVIDER NOTE - CLINICAL SUMMARY MEDICAL DECISION MAKING FREE TEXT BOX
87 y M, hx of BPH, HTN, DM, CHF, presenting from home with concern for frequent fall. No head injury, extremity deformity, chest pain, abdominal pain. Lives by himself, ambulates with walker/cane. VSWNL on arrival. PE as noted above. Patient well appearing, NAD, speaking full sentences, no focal deficit, moving extremities spontaneously. Recent hospitalization due to similar concern. PT/OT appointment on 02/28. Patient may benefit from home health aide. May need to be admitted for PT evaluation. May not be safe discharge.

## 2023-02-25 NOTE — CHART NOTE - NSCHARTNOTEFT_GEN_A_CORE
EMERGENCY : [facility] consulted by Attending MD to provide patient with resources for private hire home health aide. LMSW reviewed patient's chart. As per chart review patient is a " 87 y M, hx of BPH, HTN, DM, CHF, presenting from home with concern for frequent fall." LMSW recommended physical therapy evaluation for safe dispo recommendations. As per Attending MD patient is refusing a physical therapy evaluation and is adamant about returning home where he resides alone. Patient refusing to stay with his children. As per Attending MD patient with A&Ox4 with capacity to refuse physical therapy evaluation and recommendations.     LMSW met with patient at bedside and introduced self and role to which he verbalized understanding. Patient is A&Ox4 at this time, caregiver declines and emergency contact identified as his son Sandoval Parham PH: [phone]. He states his son is outside because they got in a disagreement, patient's son wants patient to stay for P/T eval and recommendations and patient states he does not want to stay. Patient states he was recently in a [place] [facility] where he was not happy and after two weeks was discharged home. He states he lives alone in a private home in [place], [place] with 7 steps to enter. He states he performs his adl's independently and ambulates with a cane, no other DME in the home. Patient has a life alert which he states he always wears. He states after being discharged from rehab, he was set up with outpatient physical therapy services which begin on this upcoming Tuesday. Patient states he drives himself to appointments. LMSW provided much education on importance of safety and safe discharge planning to which he verbalizes understanding but he continues to refuse P/T eval and recs, he refuses to stay with family and is adamant that he will not change his mind and wants to go home today.     Case discussed with case . LMSW provided education on private hire home health aides to which patient was receptive. LMSW provided patient with information on [facility] to which he accepted. Patient states he is considering privately hiring a HHA to come a couple of hours per week. Patient states no further needs for LMSW at this time. LMSW provided contact information and ensured ongoing social work availability. LMSW provided Attending MD with all above information. As per MD, patient's son remains outside as he and patient came to hospital together. MD states patient pending lab work for discharge clearance. Ongoing social work availability ensured, SW continues to remain available for any further needs.

## 2023-02-25 NOTE — ED PROVIDER NOTE - ATTENDING CONTRIBUTION TO CARE
This is a 87-year-old gentleman who has a history of restless leg syndrome CKD, diabetes, IBS, mitral, hypertension, hyperlipidemia, over the last few days has been falling more.  He fell 3 times today.  There is no head strike.  He states that it is all because of his cane and he is tripping over things.  I asked him if there was anything in particular that we can modify about his environment in the house to avoid the falls and he did not have any particular information to assist with that.  He does not live with anyone else in the house however he does have a life alert.  He was able to get up from 2 out of the 3 falls, but the third 1 he required assistance and called for help.  He is getting outpatient PT.  There is no spinal tenderness.  There is no tenderness to the skull.  There is full range of motion to upper and lower extremities.  There is full range of motion of the hips knees shoulders and elbows.  There is no tenderness to the hips.  There is no tenderness to the abdomen.  Clear lungs and no tenderness to chest wall.  At this time will do basic blood work CT had and reassess the patient.  4:08 PM patient's creatinine appears to be 3.  I discussed with this patient and he has had CKD in the past and followed up with a nephrologist who told him that he never has to follow-up with a nephrologist again because he is going to die before the kidney failure becomes an issue.  He did not know what his creatinine was.  I advised him that a creatinine of 3 is more likely to cause him trouble than that he will die from something else from the kidney failure.  For this reason I advised him that it is very important he stays in the hospital for further evaluation and care.  He is now more agreeable.  Initially the patient was very disagreeable to staying in the hospital and was willing to leave against our advice even at the risk of falling and injuring himself.  He values strongly his independence even over self injury.  The patient probably has capacity.  I discussed with his son and his daughter at the bedside my concerns and they share these concerns however they do not think that he is a different person than his usual self.

## 2023-02-25 NOTE — CONSULT NOTE ADULT - ASSESSMENT
87M, hx of BPH, HTN, DM, CHF, s/p lap des and ERCP (2019, Dr. CRISTHIAN Hutchinson) and LAR (2012 Dr. Alberts) presenting from home with concern for frequent fall. Surgery called for an incidental findings of PV gas in liver.    - PV air is most likely 2/2 prior ERCP/sphincterectomy   - Clinically pt is doing well from GI perspective   - No further work up needed   - Rest of care per primary       D/w Dr. Ojeda on behalf of Dr. Jul[facility] [id number] 87M, hx of BPH, HTN, DM, CHF, s/p lap des and ERCP (2019, Dr. CRISTHIAN Hutchinson) and LAR (2012 Dr. Alberts) presenting from home with concern for frequent fall. Surgery called for an incidental findings of gas in liver.    - PV air is most likely not portal venous, and more likely pneumobilia 2/2 prior ERCP/sphincterectomy   - Clinically pt is doing well from GI perspective   - No further work up needed   - Rest of care per primary       D/w Dr. Ojeda on behalf of Dr. Julio [facility] [id number]

## 2023-02-25 NOTE — CONSULT NOTE ADULT - SUBJECTIVE AND OBJECTIVE BOX
General Surgery Consult      HPI: 87M, hx of BPH, HTN, DM, CHF, presenting from home with concern for frequent fall. Patient reports that he had 3 falls today. Reports no head injury. Patient not on any blood thinner, aspirin. Denies headache, vision changes, chest pain, shortness of breath, abdominal pain, leg pain, arm pain. Patient was recently admitted to an outside hospital for similar concern, patient was discharged to rehab center and discharged home 1.5      PAST MEDICAL HISTORY:  HTN (hypertension)    Hyperlipidemia    PVC&#x27;s (premature ventricular contractions)    MVP (mitral valve prolapse)    Darier&#x27;s disease    IBS (irritable bowel syndrome)    Diverticulitis    Type 2 diabetes mellitus    Cholelithiasis    BPH (Benign Prostatic Hyperplasia)    Seasonal allergies    Restless leg syndrome    Insomnia        PAST SURGICAL HISTORY:  S/P tonsillectomy    S/P TURP    S/P hernia repair    Anal fistula    Hx of left hemicolectomy    S/P hip replacement, right    S/P ERCP        MEDICATIONS:      ALLERGIES:  Compazine (Other)  fish (Anaphylaxis)  iodine containing compounds (Rash; Short breath)  sulfa drugs (Hives)  zolpidem (Other)      VITALS & I/Os:  Vital Signs Last 24 Hrs  T(C): 36.3 (2023 19:02), Max: 36.8 (2023 13:29)  T(F): 97.3 (2023 19:02), Max: 98.2 (2023 13:29)  HR: 88 (2023 19:02) (71 - 88)  BP: 149/82 (2023 19:02) (126/65 - 149/82)  BP(mean): --  RR: 19 (2023 15:30) (19 - 19)  SpO2: 97% (2023 19:02) (95% - 97%)    Parameters below as of 2023 15:30  Patient On (Oxygen Delivery Method): room air        I&O's Summary      PHYSICAL EXAM:  General: No acute distress  Respiratory: Nonlabored  Cardiovascular: RRR  Abdominal: Soft, nondistended, nontender. No rebound or guarding. No organomegaly, no palpable mass.  Extremities: Warm    LABS:                        15.9   14.13 )-----------( 320      ( 2023 15:04 )             48.9         143  |  106  |  56<H>  ----------------------------<  110<H>  4.5   |  22  |  3.40<H>    Ca    12.9<H>      2023 15:04    TPro  6.4  /  Alb  4.3  /  TBili  0.7  /  DBili  x   /  AST  77<H>  /  ALT  31  /  AlkPhos  72      Lactate:   @ 20:17  0.9    PT/INR - ( 2023 20:14 )   PT: 11.4 sec;   INR: 0.98 ratio         PTT - ( 2023 20:14 )  PTT:30.9 sec          Urinalysis Basic - ( 2023 17:50 )    Color: Light Yellow / Appearance: Clear / S.017 / pH: x  Gluc: x / Ketone: Trace  / Bili: Negative / Urobili: Negative   Blood: x / Protein: 100 mg/dL / Nitrite: Negative   Leuk Esterase: Negative / RBC: 2 /hpf / WBC 1 /HPF   Sq Epi: x / Non Sq Epi: 1 /hpf / Bacteria: Negative        IMAGING:                                                                                               General Surgery Consult      HPI: 87M, hx of BPH, HTN, DM, CHF, s/p lap des and ERCP (2019, Dr. CRISTHIAN Hutchinson) and LAR ( Dr. Alberts) presenting from home with concern for frequent fall. Surgery called for an incidental findings of PV gas in liver. Pt denies any abd pain, reports tolerating diet, passing gas and had BM. Denies fevers/chills. Denies any recent abd instrumentation.     In ED,         PAST MEDICAL HISTORY:  HTN (hypertension)    Hyperlipidemia    PVC&#x27;s (premature ventricular contractions)    MVP (mitral valve prolapse)    Darier&#x27;s disease    IBS (irritable bowel syndrome)    Diverticulitis    Type 2 diabetes mellitus    Cholelithiasis    BPH (Benign Prostatic Hyperplasia)    Seasonal allergies    Restless leg syndrome    Insomnia        PAST SURGICAL HISTORY:  S/P tonsillectomy    S/P TURP    S/P hernia repair    Anal fistula    Hx of left hemicolectomy    S/P hip replacement, right    S/P ERCP        MEDICATIONS:      ALLERGIES:  Compazine (Other)  fish (Anaphylaxis)  iodine containing compounds (Rash; Short breath)  sulfa drugs (Hives)  zolpidem (Other)      VITALS & I/Os:  Vital Signs Last 24 Hrs  T(C): 36.3 (2023 19:02), Max: 36.8 (2023 13:29)  T(F): 97.3 (2023 19:02), Max: 98.2 (2023 13:29)  HR: 88 (2023 19:02) (71 - 88)  BP: 149/82 (2023 19:02) (126/65 - 149/82)  BP(mean): --  RR: 19 (2023 15:30) (19 - 19)  SpO2: 97% (2023 19:02) (95% - 97%)    Parameters below as of 2023 15:30  Patient On (Oxygen Delivery Method): room air        I&O's Summary      PHYSICAL EXAM:  General: No acute distress  Respiratory: Nonlabored  Cardiovascular: RRR  Abdominal: Soft, nondistended, nontender. No rebound or guarding. No organomegaly, no palpable mass.  Extremities: Warm    LABS:                        15.9   14.13 )-----------( 320      ( 2023 15:04 )             48.9         143  |  106  |  56<H>  ----------------------------<  110<H>  4.5   |  22  |  3.40<H>    Ca    12.9<H>      2023 15:04    TPro  6.4  /  Alb  4.3  /  TBili  0.7  /  DBili  x   /  AST  77<H>  /  ALT  31  /  AlkPhos  72      Lactate:   @ 20:17  0.9    PT/INR - ( 2023 20:14 )   PT: 11.4 sec;   INR: 0.98 ratio         PTT - ( 2023 20:14 )  PTT:30.9 sec          Urinalysis Basic - ( 2023 17:50 )    Color: Light Yellow / Appearance: Clear / S.017 / pH: x  Gluc: x / Ketone: Trace  / Bili: Negative / Urobili: Negative   Blood: x / Protein: 100 mg/dL / Nitrite: Negative   Leuk Esterase: Negative / RBC: 2 /hpf / WBC 1 /HPF   Sq Epi: x / Non Sq Epi: 1 /hpf / Bacteria: Negative        IMAGING:                                                                                               General Surgery Consult      HPI: 87M, hx of BPH, HTN, DM, CHF, s/p lap des and ERCP (2019, Dr. CRISTHIAN Hutchinson) and LAR ( Dr. Alberts) presenting from home with concern for frequent fall. Surgery called for an incidental findings of PV gas in liver. Pt denies any abd pain, reports tolerating diet, passing gas and had BM. Denies fevers/chills. Denies any recent abd instrumentation.     In ED, WBC 14, Lactate 0.9, Ca 12.9 and CR 3.4. CT c/w a small PV gas.          PAST MEDICAL HISTORY:  HTN (hypertension)    Hyperlipidemia    PVC&#x27;s (premature ventricular contractions)    MVP (mitral valve prolapse)    Darier&#x27;s disease    IBS (irritable bowel syndrome)    Diverticulitis    Type 2 diabetes mellitus    Cholelithiasis    BPH (Benign Prostatic Hyperplasia)    Seasonal allergies    Restless leg syndrome    Insomnia        PAST SURGICAL HISTORY:  S/P tonsillectomy    S/P TURP    S/P hernia repair    Anal fistula    Hx of left hemicolectomy    S/P hip replacement, right    S/P ERCP        MEDICATIONS:      ALLERGIES:  Compazine (Other)  fish (Anaphylaxis)  iodine containing compounds (Rash; Short breath)  sulfa drugs (Hives)  zolpidem (Other)      VITALS & I/Os:  Vital Signs Last 24 Hrs  T(C): 36.3 (2023 19:02), Max: 36.8 (2023 13:29)  T(F): 97.3 (2023 19:02), Max: 98.2 (2023 13:29)  HR: 88 (2023 19:02) (71 - 88)  BP: 149/82 (2023 19:02) (126/65 - 149/82)  BP(mean): --  RR: 19 (2023 15:30) (19 - 19)  SpO2: 97% (2023 19:02) (95% - 97%)    Parameters below as of 2023 15:30  Patient On (Oxygen Delivery Method): room air        I&O's Summary      PHYSICAL EXAM:  General: No acute distress  Respiratory: Nonlabored  Cardiovascular: RRR  Abdominal: Soft, nondistended, nontender. No rebound or guarding. No organomegaly, no palpable mass.  Extremities: Warm    LABS:                        15.9   14.13 )-----------( 320      ( 2023 15:04 )             48.9         143  |  106  |  56<H>  ----------------------------<  110<H>  4.5   |  22  |  3.40<H>    Ca    12.9<H>      2023 15:04    TPro  6.4  /  Alb  4.3  /  TBili  0.7  /  DBili  x   /  AST  77<H>  /  ALT  31  /  AlkPhos  72      Lactate:   @ 20:17  0.9    PT/INR - ( 2023 20:14 )   PT: 11.4 sec;   INR: 0.98 ratio         PTT - ( 2023 20:14 )  PTT:30.9 sec          Urinalysis Basic - ( 2023 17:50 )    Color: Light Yellow / Appearance: Clear / S.017 / pH: x  Gluc: x / Ketone: Trace  / Bili: Negative / Urobili: Negative   Blood: x / Protein: 100 mg/dL / Nitrite: Negative   Leuk Esterase: Negative / RBC: 2 /hpf / WBC 1 /HPF   Sq Epi: x / Non Sq Epi: 1 /hpf / Bacteria: Negative        < from: CT Abdomen and Pelvis No Cont (23 @ 19:19) >  INTERPRETATION:  CLINICAL INFORMATION: ANAM MCT    COMPARISON: 19.    CONTRAST/COMPLICATIONS:  IV Contrast: NONE  Oral Contrast: NONE  Complications: None reported at time of study completion    PROCEDURE:  CT of the Abdomen and Pelvis was performed.  Sagittal and coronal reformats were performed.    FINDINGS:    LOWER CHEST: Within normal limits.    LIVER: There are linear foci of air in the liver, greater in the left   lobe.  BILE DUCTS: Normal caliber.  GALLBLADDER: Status post cholecystectomy.  SPLEEN: Within normal limits.  PANCREAS: Within normal limits.  ADRENALS: Within normal limits.  KIDNEYS/URETERS: Cysts and otherlesions too small to characterize.    BLADDER: Suarez catheter in place.  REPRODUCTIVE ORGANS: Within normal limits.    BOWEL: No bowel obstruction. The appendix is normal.  There is moderate   colonic stool.  PERITONEUM: No ascites.  VESSELS:  Withinnormal limits.  RETROPERITONEUM/LYMPH NODES: No lymphadenopathy.  ABDOMINAL WALL: Small fat-containing ventral hernia. Right-sided   fat-containing ventral hernia.  BONES: Right hip arthroplasty. Right posterior eight rib fracture appears   acute to subacute.    IMPRESSION: Linear foci of air in the liver worrisome for portal venous   gas.    Right eighth rib fracture appears acute to subacute.    < end of copied text >                                                                                                 General Surgery Consult      HPI: 87M, hx of BPH, HTN, DM, CHF, s/p lap des and ERCP (2019, Dr. CRISTHIAN Hutchinson) and LAR ( Dr. Alberts) presenting from home with concern for frequent fall. Surgery called for an incidental findings of PV gas in liver. Pt denies any abd pain, reports tolerating diet, passing gas and had BM. Denies fevers/chills. Denies any recent abd instrumentation.     In ED, WBC 14, Lactate 0.9, Ca 12.9 and CR 3.4. CT c/w a small questionable PV gas.          PAST MEDICAL HISTORY:  HTN (hypertension)    Hyperlipidemia    PVC&#x27;s (premature ventricular contractions)    MVP (mitral valve prolapse)    Darier&#x27;s disease    IBS (irritable bowel syndrome)    Diverticulitis    Type 2 diabetes mellitus    Cholelithiasis    BPH (Benign Prostatic Hyperplasia)    Seasonal allergies    Restless leg syndrome    Insomnia        PAST SURGICAL HISTORY:  S/P tonsillectomy    S/P TURP    S/P hernia repair    Anal fistula    Hx of left hemicolectomy    S/P hip replacement, right    S/P ERCP        MEDICATIONS:      ALLERGIES:  Compazine (Other)  fish (Anaphylaxis)  iodine containing compounds (Rash; Short breath)  sulfa drugs (Hives)  zolpidem (Other)      VITALS & I/Os:  Vital Signs Last 24 Hrs  T(C): 36.3 (2023 19:02), Max: 36.8 (2023 13:29)  T(F): 97.3 (2023 19:02), Max: 98.2 (2023 13:29)  HR: 88 (2023 19:02) (71 - 88)  BP: 149/82 (2023 19:02) (126/65 - 149/82)  BP(mean): --  RR: 19 (2023 15:30) (19 - 19)  SpO2: 97% (2023 19:02) (95% - 97%)    Parameters below as of 2023 15:30  Patient On (Oxygen Delivery Method): room air        I&O's Summary      PHYSICAL EXAM:  General: No acute distress  Respiratory: Nonlabored  Cardiovascular: RRR  Abdominal: Soft, nondistended, nontender. No rebound or guarding. No organomegaly, no palpable mass.  Extremities: Warm    LABS:                        15.9   14.13 )-----------( 320      ( 2023 15:04 )             48.9         143  |  106  |  56<H>  ----------------------------<  110<H>  4.5   |  22  |  3.40<H>    Ca    12.9<H>      2023 15:04    TPro  6.4  /  Alb  4.3  /  TBili  0.7  /  DBili  x   /  AST  77<H>  /  ALT  31  /  AlkPhos  72      Lactate:   @ 20:17  0.9    PT/INR - ( 2023 20:14 )   PT: 11.4 sec;   INR: 0.98 ratio         PTT - ( 2023 20:14 )  PTT:30.9 sec          Urinalysis Basic - ( 2023 17:50 )    Color: Light Yellow / Appearance: Clear / S.017 / pH: x  Gluc: x / Ketone: Trace  / Bili: Negative / Urobili: Negative   Blood: x / Protein: 100 mg/dL / Nitrite: Negative   Leuk Esterase: Negative / RBC: 2 /hpf / WBC 1 /HPF   Sq Epi: x / Non Sq Epi: 1 /hpf / Bacteria: Negative        < from: CT Abdomen and Pelvis No Cont (23 @ 19:19) >  INTERPRETATION:  CLINICAL INFORMATION: ANAM MCT    COMPARISON: 19.    CONTRAST/COMPLICATIONS:  IV Contrast: NONE  Oral Contrast: NONE  Complications: None reported at time of study completion    PROCEDURE:  CT of the Abdomen and Pelvis was performed.  Sagittal and coronal reformats were performed.    FINDINGS:    LOWER CHEST: Within normal limits.    LIVER: There are linear foci of air in the liver, greater in the left   lobe.  BILE DUCTS: Normal caliber.  GALLBLADDER: Status post cholecystectomy.  SPLEEN: Within normal limits.  PANCREAS: Within normal limits.  ADRENALS: Within normal limits.  KIDNEYS/URETERS: Cysts and otherlesions too small to characterize.    BLADDER: Suarez catheter in place.  REPRODUCTIVE ORGANS: Within normal limits.    BOWEL: No bowel obstruction. The appendix is normal.  There is moderate   colonic stool.  PERITONEUM: No ascites.  VESSELS:  Withinnormal limits.  RETROPERITONEUM/LYMPH NODES: No lymphadenopathy.  ABDOMINAL WALL: Small fat-containing ventral hernia. Right-sided   fat-containing ventral hernia.  BONES: Right hip arthroplasty. Right posterior eight rib fracture appears   acute to subacute.    IMPRESSION: Linear foci of air in the liver worrisome for portal venous   gas.    Right eighth rib fracture appears acute to subacute.    < end of copied text >

## 2023-02-25 NOTE — CONSULT NOTE ADULT - ATTENDING COMMENTS
Patient seen and examined in ED, chart reviewed.  Admitted after frequent falls, CT A/P revealed gas in liver, questionable small amount PV gas but more likely pneumobilia given history ERCP and without other bowel processes on CT.  Patient without abdominal complaints, tolerating diet.  Abd soft, non tender, non distended.      - no abdominal concerns at this time, no surgical intervention indicated  - care per ED, medicine    Malgorzata Kim MD Patient seen and examined in ED, chart reviewed.  Admitted after frequent falls, CT A/P revealed gas in liver, questionable small amount PV gas but more likely pneumobilia given history ERCP and without other bowel processes on CT.  Patient without abdominal complaints, tolerating diet.  Abd soft, non tender, non distended.      - no abdominal concerns at this time, no surgical intervention indicated  - care per ED, medicine  - surgery will sign off    Malgorzata Kim MD

## 2023-02-25 NOTE — ED PROVIDER NOTE - PROGRESS NOTE DETAILS
Jean-Claude Johnson MD: Patient found to have ANAM with elevated creatinine increased from baseline of 1.5.  Signed out pending imaging prior to admission.  Postvoid residual showed over 600 cc of urine.  Suarez to be placed, will send off urine electrolytes and CT abdomen Noncon to evaluate for ANAM. Jean-Claude Johnson MD: Got a call from radiologist who stated patient with findings of portal venous gas, possibly secondary to ischemic bowel, will obtain preoperative labs and lactic acid level and consult with surgery. Progress note this patient was signed out to me.  Patient had multiple falls at home, not on AC.  Patient found to have an ANAM here and was retaining 600 cc of urine and had a Suarez placed.   CAT scan shows   Concern for possible portal vein gas Jarad Winter, PGY2 this patient was signed out to me.  Patient had multiple falls at home, not on AC.  Patient found to have an ANAM here and was retaining 600 cc of urine and had a Suarez placed.   CAT scan shows   Concern for possible portal vein gas    Surgery was consulted and will see pt Jean-Claude Johnson MD: Got a call from radiologist who stated patient with findings of portal venous gas, possibly secondary to ischemic bowel. Pt re-evaluated and continues to have no abd pain, and no TTP on exam, and no rebound or guarding. will obtain preoperative labs and lactic acid level and consult with surgery. Jarad Winetr, PGY2 Patient was seen by surgery.  Patient's abdomen continues to be benign

## 2023-02-25 NOTE — ED ADULT NURSE REASSESSMENT NOTE - NS ED NURSE REASSESS COMMENT FT1
bladder scanned found 614ml of urine, MD aware  ordered maldonado. Maldonado catheter inserted using sterile technique, draining by gravity, clear yellow urine, secured with StatLock. Second RN present to confirm sterility.

## 2023-02-25 NOTE — ED ADULT NURSE NOTE - OBJECTIVE STATEMENT
86 yo male PMH HTN, DM, CHF, BPH, A&ox3, BIBEMS s/p fall.  Pt reports having multiple mechanical falls today, denies head injury or LOC.  No complaints at this time/pain.  Pt uses cane to ambulate.  Breathing even and unlabored, abdomen soft nontender, no pedal edema, no deformities, contusions, abrasions, lacerations, pulses,motor, sensory intact. Pt denies chest pain, palpitations, shortness of breath, headache, visual disturbances, numbness/tingling, fever, chills, diaphoresis,  nausea, vomiting, constipation, diarrhea, or urinary symptoms. 86 yo male PMH HTN, DM, CHF, BPH, A&ox3, BIBEMS s/p fall.  Pt reports having multiple mechanical falls today, denies head injury or LOC.  No complaints at this time/pain.  Pt uses cane to ambulate.  Breathing even and unlabored, abdomen soft nontender, +1 pedal edema, no deformities, contusions, abrasions, lacerations, pulses,motor, sensory intact. Pt denies chest pain, palpitations, shortness of breath, headache, visual disturbances, numbness/tingling, fever, chills, diaphoresis,  nausea, vomiting, constipation, diarrhea, or urinary symptoms.

## 2023-02-25 NOTE — ED PROVIDER NOTE - OBJECTIVE STATEMENT
87 y M, hx of BPH, HTN, DM, CHF, presenting from home with concern for frequent fall. Patient reports that he had 3 falls today. Reports no head injury. Patient not on any blood thinner, aspirin. Denies headache, vision changes, chest pain, shortness of breath, abdominal pain, leg pain, arm pain. Patient was recently admitted to an outside hospital for similar concern, patient was discharged to rehab center and discharged home 1.5 wk ago. Ambulates with a cane and walker. Lives by himself. Son lives at 10 min away. Son at bedside.

## 2023-02-26 DIAGNOSIS — R29.6 REPEATED FALLS: ICD-10-CM
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
## 2023-02-26 DIAGNOSIS — E83.52 HYPERCALCEMIA: ICD-10-CM

## 2023-02-26 DIAGNOSIS — I10 ESSENTIAL (PRIMARY) HYPERTENSION: ICD-10-CM

## 2023-02-26 DIAGNOSIS — E11.9 TYPE 2 DIABETES MELLITUS WITHOUT COMPLICATIONS: ICD-10-CM

## 2023-02-26 DIAGNOSIS — Z29.9 ENCOUNTER FOR PROPHYLACTIC MEASURES, UNSPECIFIED: ICD-10-CM

## 2023-02-26 DIAGNOSIS — N17.9 ACUTE KIDNEY FAILURE, UNSPECIFIED: ICD-10-CM

## 2023-02-26 LAB
A1C WITH ESTIMATED AVERAGE GLUCOSE RESULT: 5.5 % — SIGNIFICANT CHANGE UP (ref 4–5.6)
ALBUMIN SERPL ELPH-MCNC: 4 G/DL — SIGNIFICANT CHANGE UP (ref 3.3–5)
ALP SERPL-CCNC: 66 U/L — SIGNIFICANT CHANGE UP (ref 40–120)
ALT FLD-CCNC: 28 U/L — SIGNIFICANT CHANGE UP (ref 10–45)
ANION GAP SERPL CALC-SCNC: 15 MMOL/L — SIGNIFICANT CHANGE UP (ref 5–17)
AST SERPL-CCNC: 51 U/L — HIGH (ref 10–40)
BILIRUB SERPL-MCNC: 0.6 MG/DL — SIGNIFICANT CHANGE UP (ref 0.2–1.2)
BUN SERPL-MCNC: 50 MG/DL — HIGH (ref 7–23)
CA-I BLD-SCNC: 1.61 MMOL/L — CRITICAL HIGH (ref 1.15–1.33)
CALCIUM SERPL-MCNC: 12.2 MG/DL — HIGH (ref 8.4–10.5)
CHLORIDE SERPL-SCNC: 108 MMOL/L — SIGNIFICANT CHANGE UP (ref 96–108)
CO2 SERPL-SCNC: 21 MMOL/L — LOW (ref 22–31)
CREAT SERPL-MCNC: 3.06 MG/DL — HIGH (ref 0.5–1.3)
CULTURE RESULTS: NO GROWTH — SIGNIFICANT CHANGE UP
EGFR: 19 ML/MIN/1.73M2 — LOW
ESTIMATED AVERAGE GLUCOSE: 111 MG/DL — SIGNIFICANT CHANGE UP (ref 68–114)
GLUCOSE BLDC GLUCOMTR-MCNC: 104 MG/DL — HIGH (ref 70–99)
GLUCOSE BLDC GLUCOMTR-MCNC: 115 MG/DL — HIGH (ref 70–99)
GLUCOSE BLDC GLUCOMTR-MCNC: 171 MG/DL — HIGH (ref 70–99)
GLUCOSE BLDC GLUCOMTR-MCNC: 83 MG/DL — SIGNIFICANT CHANGE UP (ref 70–99)
GLUCOSE SERPL-MCNC: 87 MG/DL — SIGNIFICANT CHANGE UP (ref 70–99)
HCT VFR BLD CALC: 48.5 % — SIGNIFICANT CHANGE UP (ref 39–50)
HGB BLD-MCNC: 16 G/DL — SIGNIFICANT CHANGE UP (ref 13–17)
MAGNESIUM SERPL-MCNC: 2.3 MG/DL — SIGNIFICANT CHANGE UP (ref 1.6–2.6)
MCHC RBC-ENTMCNC: 33 GM/DL — SIGNIFICANT CHANGE UP (ref 32–36)
MCHC RBC-ENTMCNC: 35.2 PG — HIGH (ref 27–34)
MCV RBC AUTO: 106.6 FL — HIGH (ref 80–100)
NRBC # BLD: 0 /100 WBCS — SIGNIFICANT CHANGE UP (ref 0–0)
OSMOLALITY SERPL: 312 MOSMOL/KG — HIGH (ref 280–301)
PHOSPHATE SERPL-MCNC: 4.7 MG/DL — HIGH (ref 2.5–4.5)
PLATELET # BLD AUTO: 298 K/UL — SIGNIFICANT CHANGE UP (ref 150–400)
POTASSIUM SERPL-MCNC: 4 MMOL/L — SIGNIFICANT CHANGE UP (ref 3.5–5.3)
POTASSIUM SERPL-SCNC: 4 MMOL/L — SIGNIFICANT CHANGE UP (ref 3.5–5.3)
PROT SERPL-MCNC: 6.2 G/DL — SIGNIFICANT CHANGE UP (ref 6–8.3)
RBC # BLD: 4.55 M/UL — SIGNIFICANT CHANGE UP (ref 4.2–5.8)
RBC # FLD: 14.7 % — HIGH (ref 10.3–14.5)
SODIUM SERPL-SCNC: 144 MMOL/L — SIGNIFICANT CHANGE UP (ref 135–145)
SPECIMEN SOURCE: SIGNIFICANT CHANGE UP
UUN UR-MCNC: 569 MG/DL — SIGNIFICANT CHANGE UP
WBC # BLD: 11.02 K/UL — HIGH (ref 3.8–10.5)
WBC # FLD AUTO: 11.02 K/UL — HIGH (ref 3.8–10.5)

## 2023-02-26 PROCEDURE — 99223 1ST HOSP IP/OBS HIGH 75: CPT

## 2023-02-26 RX ORDER — TAMSULOSIN HYDROCHLORIDE 0.4 MG/1
1 CAPSULE ORAL
Qty: 0 | Refills: 0 | DISCHARGE

## 2023-02-26 RX ORDER — UBIDECARENONE 100 MG
1 CAPSULE ORAL
Qty: 0 | Refills: 0 | DISCHARGE

## 2023-02-26 RX ORDER — PRAMIPEXOLE DIHYDROCHLORIDE 0.12 MG/1
1 TABLET ORAL
Qty: 0 | Refills: 0 | DISCHARGE

## 2023-02-26 RX ORDER — SODIUM CHLORIDE 9 MG/ML
1000 INJECTION, SOLUTION INTRAVENOUS
Refills: 0 | Status: DISCONTINUED | OUTPATIENT
Start: 2023-02-26 | End: 2023-03-01

## 2023-02-26 RX ORDER — DEXTROSE 50 % IN WATER 50 %
12.5 SYRINGE (ML) INTRAVENOUS ONCE
Refills: 0 | Status: DISCONTINUED | OUTPATIENT
Start: 2023-02-26 | End: 2023-03-01

## 2023-02-26 RX ORDER — AMLODIPINE BESYLATE 2.5 MG/1
5 TABLET ORAL DAILY
Refills: 0 | Status: DISCONTINUED | OUTPATIENT
Start: 2023-02-26 | End: 2023-02-27

## 2023-02-26 RX ORDER — INSULIN LISPRO 100/ML
VIAL (ML) SUBCUTANEOUS
Refills: 0 | Status: DISCONTINUED | OUTPATIENT
Start: 2023-02-26 | End: 2023-03-01

## 2023-02-26 RX ORDER — LANOLIN ALCOHOL/MO/W.PET/CERES
3 CREAM (GRAM) TOPICAL AT BEDTIME
Refills: 0 | Status: DISCONTINUED | OUTPATIENT
Start: 2023-02-26 | End: 2023-03-01

## 2023-02-26 RX ORDER — DEXTROSE 50 % IN WATER 50 %
15 SYRINGE (ML) INTRAVENOUS ONCE
Refills: 0 | Status: DISCONTINUED | OUTPATIENT
Start: 2023-02-26 | End: 2023-03-01

## 2023-02-26 RX ORDER — AMLODIPINE BESYLATE 2.5 MG/1
5 TABLET ORAL ONCE
Refills: 0 | Status: COMPLETED | OUTPATIENT
Start: 2023-02-26 | End: 2023-02-26

## 2023-02-26 RX ORDER — GLUCAGON INJECTION, SOLUTION 0.5 MG/.1ML
1 INJECTION, SOLUTION SUBCUTANEOUS ONCE
Refills: 0 | Status: DISCONTINUED | OUTPATIENT
Start: 2023-02-26 | End: 2023-03-01

## 2023-02-26 RX ORDER — L.ACIDOPH/B.ANIMALIS/B.LONGUM 15B CELL
1 CAPSULE ORAL
Qty: 0 | Refills: 0 | DISCHARGE

## 2023-02-26 RX ORDER — BNT162B2 ORIGINAL AND OMICRON BA.4/BA.5 .1125; .1125 MG/2.25ML; MG/2.25ML
0.3 INJECTION, SUSPENSION INTRAMUSCULAR ONCE
Refills: 0 | Status: DISCONTINUED | OUTPATIENT
Start: 2023-02-26 | End: 2023-03-01

## 2023-02-26 RX ORDER — DEXTROSE 50 % IN WATER 50 %
25 SYRINGE (ML) INTRAVENOUS ONCE
Refills: 0 | Status: DISCONTINUED | OUTPATIENT
Start: 2023-02-26 | End: 2023-03-01

## 2023-02-26 RX ORDER — FINASTERIDE 5 MG/1
1 TABLET, FILM COATED ORAL
Qty: 0 | Refills: 0 | DISCHARGE

## 2023-02-26 RX ORDER — INSULIN LISPRO 100/ML
VIAL (ML) SUBCUTANEOUS AT BEDTIME
Refills: 0 | Status: DISCONTINUED | OUTPATIENT
Start: 2023-02-26 | End: 2023-03-01

## 2023-02-26 RX ORDER — ASPIRIN/CALCIUM CARB/MAGNESIUM 324 MG
1 TABLET ORAL
Qty: 0 | Refills: 0 | DISCHARGE

## 2023-02-26 RX ORDER — HEPARIN SODIUM 5000 [USP'U]/ML
5000 INJECTION INTRAVENOUS; SUBCUTANEOUS EVERY 8 HOURS
Refills: 0 | Status: DISCONTINUED | OUTPATIENT
Start: 2023-02-26 | End: 2023-03-01

## 2023-02-26 RX ORDER — AMLODIPINE BESYLATE 2.5 MG/1
1 TABLET ORAL
Qty: 0 | Refills: 0 | DISCHARGE

## 2023-02-26 RX ORDER — SODIUM CHLORIDE 9 MG/ML
500 INJECTION, SOLUTION INTRAVENOUS
Refills: 0 | Status: DISCONTINUED | OUTPATIENT
Start: 2023-02-26 | End: 2023-03-01

## 2023-02-26 RX ADMIN — SODIUM CHLORIDE 70 MILLILITER(S): 9 INJECTION, SOLUTION INTRAVENOUS at 06:21

## 2023-02-26 RX ADMIN — HEPARIN SODIUM 5000 UNIT(S): 5000 INJECTION INTRAVENOUS; SUBCUTANEOUS at 05:07

## 2023-02-26 RX ADMIN — SODIUM CHLORIDE 75 MILLILITER(S): 9 INJECTION, SOLUTION INTRAVENOUS at 15:06

## 2023-02-26 RX ADMIN — AMLODIPINE BESYLATE 5 MILLIGRAM(S): 2.5 TABLET ORAL at 14:05

## 2023-02-26 RX ADMIN — Medication 3 MILLIGRAM(S): at 23:21

## 2023-02-26 RX ADMIN — HEPARIN SODIUM 5000 UNIT(S): 5000 INJECTION INTRAVENOUS; SUBCUTANEOUS at 15:06

## 2023-02-26 RX ADMIN — HEPARIN SODIUM 5000 UNIT(S): 5000 INJECTION INTRAVENOUS; SUBCUTANEOUS at 22:03

## 2023-02-26 NOTE — H&P ADULT - PROBLEM SELECTOR PLAN 4
- Fingersticks pre meal and at bedtime  - Assess for need for sliding scale insulin once patient is eating  - F/U A1C - Hemodynamically stable currently with regard to BP  - Will need medication rec for current BP meds as patient does not remember. Can then observe for need to restart while inpatient based on patient's trend

## 2023-02-26 NOTE — PHYSICAL THERAPY INITIAL EVALUATION ADULT - FUNCTIONAL LIMITATIONS, PT EVAL
1-We will stop OCP, pt will use another contraception method, may consult with GYNE   ER precautions reviewed for any increased SOB  May do labs again when off OCP (ordered LSH/)  Reviewed potential premenopausal sx    2-Anxiety/Depression/insomnia  Psych and counseling names given again  We will renew Sertaline and Ambien  Advised on sleep study, pt declines  Due for annual 9/2019    3-SOB  EKG reviewed, clinical exam reviewed  Pt declines xr today  We did review OCP SE, ER precautions reviewed with pt.  URI is contributing as is anxiety, refer to clinical exam  Self care measures reviewed    Pt verbalized understanding and agreeable to plan  
self-care/home management

## 2023-02-26 NOTE — PROGRESS NOTE ADULT - SUBJECTIVE AND OBJECTIVE BOX
PROGRESS NOTE:     Patient is a 87y old  Male who presents with a chief complaint of ANAM (2023 03:29)      ---------------------------------------------------  Tonia May  PGY-1, Internal Medicine  Available on [employer] Teams  Pager: [phone] ([facility]), or [phone] ([facility])  ---------------------------------------------------    SUBJECTIVE / OVERNIGHT EVENTS:    No acute events overnight. Patient examined at bedside with no acute complaints.     Pain:  Bowel Movements:  Urination:  OOB:  PT:    REVIEW OF SYSTEMS:    CONSTITUTIONAL: No weakness, fevers or chills  EYES/ENT: No visual changes;  No vertigo or throat pain   NECK: No pain or stiffness  RESPIRATORY: No cough, wheezing, hemoptysis; No shortness of breath  CARDIOVASCULAR: No chest pain or palpitations  GASTROINTESTINAL: No abdominal or epigastric pain. No nausea, vomiting, or hematemesis; No diarrhea or constipation. No melena or hematochezia.  GENITOURINARY: No dysuria, frequency or hematuria  NEUROLOGICAL: No numbness or weakness  SKIN: No itching, rashes      MEDICATIONS  (STANDING):  dextrose 5%. 1000 milliLiter(s) (50 mL/Hr) IV Continuous <Continuous>  dextrose 5%. 1000 milliLiter(s) (100 mL/Hr) IV Continuous <Continuous>  dextrose 50% Injectable 25 Gram(s) IV Push once  dextrose 50% Injectable 12.5 Gram(s) IV Push once  dextrose 50% Injectable 25 Gram(s) IV Push once  glucagon  Injectable 1 milliGRAM(s) IntraMuscular once  heparin   Injectable 5000 Unit(s) SubCutaneous every 8 hours  lactated ringers. 500 milliLiter(s) (70 mL/Hr) IV Continuous <Continuous>    MEDICATIONS  (PRN):  dextrose Oral Gel 15 Gram(s) Oral once PRN Blood Glucose LESS THAN 70 milliGRAM(s)/deciliter      CAPILLARY BLOOD GLUCOSE      POCT Blood Glucose.: 83 mg/dL (2023 00:29)    I&O's Summary    2023 07:01  -  2023 07:00  --------------------------------------------------------  IN: 0 mL / OUT: 1280 mL / NET: -1280 mL        VITALS:   T(C): 36.7 (23 @ 04:10), Max: 37 (23 @ 00:04)  HR: 85 (23 @ 04:10) (71 - 88)  BP: 136/70 (23 @ 04:10) (126/65 - 149/82)  RR: 18 (23 @ 04:10) (16 - 19)  SpO2: 93% (23 @ 04:10) (93% - 97%)    GENERAL: NAD, lying in bed comfortably  HEAD:  Atraumatic, normocephalic  EYES: EOMI, PERRLA, conjunctiva and sclera clear  ENT: Moist mucous membranes  NECK: Supple, no JVD  HEART: Regular rate and rhythm, no murmurs, rubs, or gallops  LUNGS: Unlabored respirations.  Clear to auscultation bilaterally, no crackles, wheezing, or rhonchi  ABDOMEN: Soft, nontender, nondistended, +BS  EXTREMITIES: 2+ peripheral pulses bilaterally. No clubbing, cyanosis, or edema  NERVOUS SYSTEM:  A&Ox3, no focal deficits   SKIN: No rashes or lesions    LABS:                        16.0   11.02 )-----------( 298      ( 2023 06:27 )             48.5         143  |  106  |  56<H>  ----------------------------<  110<H>  4.5   |  22  |  3.40<H>    Ca    12.9<H>      2023 15:04    TPro  6.4  /  Alb  4.3  /  TBili  0.7  /  DBili  x   /  AST  77<H>  /  ALT  31  /  AlkPhos  72      PT/INR - ( 2023 20:14 )   PT: 11.4 sec;   INR: 0.98 ratio         PTT - ( 2023 20:14 )  PTT:30.9 sec      Urinalysis Basic - ( 2023 17:50 )    Color: Light Yellow / Appearance: Clear / S.017 / pH: x  Gluc: x / Ketone: Trace  / Bili: Negative / Urobili: Negative   Blood: x / Protein: 100 mg/dL / Nitrite: Negative   Leuk Esterase: Negative / RBC: 2 /hpf / WBC 1 /HPF   Sq Epi: x / Non Sq Epi: 1 /hpf / Bacteria: Negative          RADIOLOGY & ADDITIONAL TESTS:  Results Reviewed:   Imaging Personally Reviewed:  Electrocardiogram Personally Reviewed:    COORDINATION OF CARE:  Care Discussed with Consultants/Other Providers [Y/N]:  Prior or Outpatient Records Reviewed [Y/N]:     PROGRESS NOTE:     Patient is a 87y old  Male who presents with a chief complaint of ANAM (2023 03:29)      ---------------------------------------------------  Tonia May  PGY-1, Internal Medicine  Available on [employer] Teams  Pager: [phone] ([facility]), or [phone] ([facility])  ---------------------------------------------------    SUBJECTIVE / OVERNIGHT EVENTS:    No acute events overnight. Patient examined at bedside with no acute complaints. Admitted overnight for 3 falls over the past 2 days in the setting of chronic bilateral lower extremity weakness. Patient has had recent       MEDICATIONS  (STANDING):  dextrose 5%. 1000 milliLiter(s) (50 mL/Hr) IV Continuous <Continuous>  dextrose 5%. 1000 milliLiter(s) (100 mL/Hr) IV Continuous <Continuous>  dextrose 50% Injectable 25 Gram(s) IV Push once  dextrose 50% Injectable 12.5 Gram(s) IV Push once  dextrose 50% Injectable 25 Gram(s) IV Push once  glucagon  Injectable 1 milliGRAM(s) IntraMuscular once  heparin   Injectable 5000 Unit(s) SubCutaneous every 8 hours  lactated ringers. 500 milliLiter(s) (70 mL/Hr) IV Continuous <Continuous>    MEDICATIONS  (PRN):  dextrose Oral Gel 15 Gram(s) Oral once PRN Blood Glucose LESS THAN 70 milliGRAM(s)/deciliter      CAPILLARY BLOOD GLUCOSE      POCT Blood Glucose.: 83 mg/dL (2023 00:29)    I&O's Summary    2023 07:01  -  2023 07:00  --------------------------------------------------------  IN: 0 mL / OUT: 1280 mL / NET: -1280 mL        VITALS:   T(C): 36.7 (23 @ 04:10), Max: 37 (23 @ 00:04)  HR: 85 (23 @ 04:10) (71 - 88)  BP: 136/70 (23 @ 04:10) (126/65 - 149/82)  RR: 18 (23 @ 04:10) (16 - 19)  SpO2: 93% (23 @ 04:10) (93% - 97%)    GENERAL: NAD, lying in bed comfortably  HEAD:  Atraumatic, normocephalic  EYES: EOMI, PERRLA, conjunctiva and sclera clear  ENT: Moist mucous membranes  NECK: Supple, no JVD  HEART: Regular rate and rhythm, no murmurs, rubs, or gallops  LUNGS: Unlabored respirations.  Clear to auscultation bilaterally, no crackles, wheezing, or rhonchi  ABDOMEN: Soft, nontender, nondistended, +BS  EXTREMITIES: 2+ peripheral pulses bilaterally. No clubbing, cyanosis, or edema  NERVOUS SYSTEM:  A&Ox3, no focal deficits   SKIN: No rashes or lesions      LABS:                        16.0   11.02 )-----------( 298      ( 2023 06:27 )             48.5         143  |  106  |  56<H>  ----------------------------<  110<H>  4.5   |  22  |  3.40<H>    Ca    12.9<H>      2023 15:04    TPro  6.4  /  Alb  4.3  /  TBili  0.7  /  DBili  x   /  AST  77<H>  /  ALT  31  /  AlkPhos  72  02-    PT/INR - ( 2023 20:14 )   PT: 11.4 sec;   INR: 0.98 ratio         PTT - ( 2023 20:14 )  PTT:30.9 sec      Urinalysis Basic - ( 2023 17:50 )    Color: Light Yellow / Appearance: Clear / S.017 / pH: x  Gluc: x / Ketone: Trace  / Bili: Negative / Urobili: Negative   Blood: x / Protein: 100 mg/dL / Nitrite: Negative   Leuk Esterase: Negative / RBC: 2 /hpf / WBC 1 /HPF   Sq Epi: x / Non Sq Epi: 1 /hpf / Bacteria: Negative          RADIOLOGY & ADDITIONAL TESTS:  Results Reviewed:   Imaging Personally Reviewed:  Electrocardiogram Personally Reviewed:    COORDINATION OF CARE:  Care Discussed with Consultants/Other Providers [Y/N]:  Prior or Outpatient Records Reviewed [Y/N]:     PROGRESS NOTE:     Patient is a 87y old  Male who presents with a chief complaint of ANAM (2023 03:29)      ---------------------------------------------------  Tonia May  PGY-1, Internal Medicine  Available on [employer] Teams  Pager: [phone] ([facility]), or [phone] ([facility])  ---------------------------------------------------    SUBJECTIVE / OVERNIGHT EVENTS:    No acute events overnight. Patient examined at bedside with no acute complaints. Admitted overnight for 3 falls over the past 2 days in the setting of chronic bilateral lower extremity weakness. Patient has good recollection of all 3 events and denies LOC, prodromal symptoms, or seizure like activity. Patient adamant that these were strictly mechanical falls, or instances where he sat down and was unable to stand up due to deconditioning and chronic weakness.      MEDICATIONS  (STANDING):  dextrose 5%. 1000 milliLiter(s) (50 mL/Hr) IV Continuous <Continuous>  dextrose 5%. 1000 milliLiter(s) (100 mL/Hr) IV Continuous <Continuous>  dextrose 50% Injectable 25 Gram(s) IV Push once  dextrose 50% Injectable 12.5 Gram(s) IV Push once  dextrose 50% Injectable 25 Gram(s) IV Push once  glucagon  Injectable 1 milliGRAM(s) IntraMuscular once  heparin   Injectable 5000 Unit(s) SubCutaneous every 8 hours  lactated ringers. 500 milliLiter(s) (70 mL/Hr) IV Continuous <Continuous>    MEDICATIONS  (PRN):  dextrose Oral Gel 15 Gram(s) Oral once PRN Blood Glucose LESS THAN 70 milliGRAM(s)/deciliter      CAPILLARY BLOOD GLUCOSE      POCT Blood Glucose.: 83 mg/dL (2023 00:29)    I&O's Summary    2023 07:01  -  2023 07:00  --------------------------------------------------------  IN: 0 mL / OUT: 1280 mL / NET: -1280 mL        VITALS:   T(C): 36.7 (23 @ 04:10), Max: 37 (23 @ 00:04)  HR: 85 (23 @ 04:10) (71 - 88)  BP: 136/70 (23 @ 04:10) (126/65 - 149/82)  RR: 18 (23 @ 04:10) (16 - 19)  SpO2: 93% (23 @ 04:10) (93% - 97%)    GENERAL: NAD, lying in bed comfortably  HEAD:  Atraumatic, normocephalic  EYES: EOMI, PERRLA, conjunctiva and sclera clear  ENT: Moist mucous membranes  NECK: Supple, no JVD  HEART: Regular rate and rhythm, no murmurs, rubs, or gallops  LUNGS: Unlabored respirations.  Clear to auscultation bilaterally, no crackles, wheezing, or rhonchi  ABDOMEN: Soft, nontender, nondistended, +BS  EXTREMITIES: 2+ peripheral pulses bilaterally. No clubbing, cyanosis, or edema  NERVOUS SYSTEM:  A&Ox3, no focal deficits   SKIN: No rashes or lesions      LABS:                        16.0   11.02 )-----------( 298      ( 2023 06:27 )             48.5         143  |  106  |  56<H>  ----------------------------<  110<H>  4.5   |  22  |  3.40<H>    Ca    12.9<H>      2023 15:04    TPro  6.4  /  Alb  4.3  /  TBili  0.7  /  DBili  x   /  AST  77<H>  /  ALT  31  /  AlkPhos  72  02    PT/INR - ( 2023 20:14 )   PT: 11.4 sec;   INR: 0.98 ratio         PTT - ( 2023 20:14 )  PTT:30.9 sec      Urinalysis Basic - ( 2023 17:50 )    Color: Light Yellow / Appearance: Clear / S.017 / pH: x  Gluc: x / Ketone: Trace  / Bili: Negative / Urobili: Negative   Blood: x / Protein: 100 mg/dL / Nitrite: Negative   Leuk Esterase: Negative / RBC: 2 /hpf / WBC 1 /HPF   Sq Epi: x / Non Sq Epi: 1 /hpf / Bacteria: Negative          RADIOLOGY & ADDITIONAL TESTS:  Results Reviewed:   Imaging Personally Reviewed:  Electrocardiogram Personally Reviewed:    COORDINATION OF CARE:  Care Discussed with Consultants/Other Providers [Y/N]:  Prior or Outpatient Records Reviewed [Y/N]:

## 2023-02-26 NOTE — PATIENT PROFILE ADULT - FALL HARM RISK - HARM RISK INTERVENTIONS

## 2023-02-26 NOTE — PROGRESS NOTE ADULT - PROBLEM SELECTOR PLAN 2
- Cr on admission of 3.40, Cr of 1.58 in 2019. Likely patient has CKD history, and based on his endorsement of having only one functional kidney  - Had moderate blood on UA with only 2 RBCs/hpf, possibly representing myoglobinuria, which could lead to ATN as cause of ANAM. Received 500cc bolus of NS while in the ED. Will F/U CK level  - Also found to be retaining 1000cc urine, which could represent a post-renal cause of patient's ANAM. Suarez now in place  - FENa appears indicative of intrinsic cause. Possibly medication related  - Monitor I&O, dose meds per eGFR, avoid nephrotoxic agents  - Trend creatinine  - Monitor for signs of post-obstructive diuresis. Per nursing, since draining 1000cc patient has put out 280cc  - TOV when appropriate  - Would possibly need renal consult if no improvement in creatinine  - F/U renal ultrasound - Cr on admission of 3.40, Cr of 1.58 in 2019. 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 Possibly medication related  - Monitor I&O, dose meds per eGFR, avoid nephrotoxic agents  - Trend creatinine  - Monitor for signs of post-obstructive diuresis. Per nursing, since draining 1000cc patient has put out 280cc  - TOV when appropriate  - F/U renal ultrasound

## 2023-02-26 NOTE — H&P ADULT - NSHPLABSRESULTS_GEN_ALL_CORE
15.9   14.13 )-----------( 320      ( 2023 15:04 )             48.9         143  |  106  |  56<H>  ----------------------------<  110<H>  4.5   |  22  |  3.40<H>    Ca    12.9<H>      2023 15:04    TPro  6.4  /  Alb  4.3  /  TBili  0.7  /  DBili  x   /  AST  77<H>  /  ALT  31  /  AlkPhos  72      PT/INR - ( 2023 20:14 )   PT: 11.4 sec;   INR: 0.98 ratio    PTT - ( 2023 20:14 )  PTT:30.9 sec    20:17 - VBG - pH: 7.32  | pCO2: 48    | pO2: 54    | Lactate: 0.9      Urinalysis Basic - ( 2023 17:50 )    Color: Light Yellow / Appearance: Clear / S.017 / pH: x  Gluc: x / Ketone: Trace  / Bili: Negative / Urobili: Negative   Blood: x / Protein: 100 mg/dL / Nitrite: Negative   Leuk Esterase: Negative / RBC: 2 /hpf / WBC 1 /HPF   Sq Epi: x / Non Sq Epi: 1 /hpf / Bacteria: Negative    EKG: Appears to be sinus rhythm with PACs     < from: CT Head No Cont (23 @ 19:19) >    ACC: [id number] EXAM:  CT CERVICAL SPINE   ORDERED BY: MARIA ISABEL CHRISTIANSEN     ACC: [id number] EXAM:  CT BRAIN   ORDERED BY: MARIA ISABEL CHRISITANSEN     PROCEDURE DATE:  2023          INTERPRETATION:  CLINICAL INDICATION:  Falls, question fracture.    TECHNIQUE : Axial CT scanning of the brain was obtained from the skull   base to the vertex without the administration of intravenous contrast.   Coronal and sagittal reformatted images were subsequently obtained.   Additionally, axial images were obtained through the cervical spine using   multislice helical technique. Reformatted coronal and sagittal images   were subsequently obtained and reviewed.    COMPARISON: No prior imaging available for comparison.    FINDINGS:    CT HEAD:  Chronic left JUANIS distribution infarct. No evidence of acute transcortical   infarction.    There is sulcal and ventricular prominence consistent with age   appropriate involutional change. There are periventricular and   subcortical white matter hypodensities, consistent withmild   microvascular changes.    There is no acute intracranial hemorrhage, mass effect, hydrocephalus, or   midline shift.  There are no extra-axial collections.    The visualized paranasal sinuses and mastoid air cells are clear.    The calvarium is intact.    CT CERVICAL SPINE:    There is grade 1 anterolisthesis of C4 on C5 and C7 on T1. There is no   prevertebral soft tissue swelling. There is no splaying of the spinous   processes. The occipital condyles are normal. Lateral masses of C1 align   normally with C2. No lucent fracture line is identified. There is no   traumatic spondylolisthesis. Facet joint alignments are maintained.    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 Findings include marginal osteophytes, uncovertebral   spurring, loss of normal disc space height and endplate sclerosis, and   facet joint space compartment narrowing with subchondral sclerosis and   hypertrophic osteophytes at multiple levels. The C5-6 and C6-7 disc  spaces appeared partly fused with associated osteophyte formation. Canal   contents are suboptimally evaluated inherent to CT technique.    The lung apices are clear. The surrounding soft tissues are unremarkable.    IMPRESSION:  CT Head:  No acuteintra-cranial hemorrhage, mass effect, or midline shift.    CT Cervical Spine:  No acute fracture or traumatic subluxation.  There are multi-level degenerative changes of the cervical spine.    --- End of Report ---

## 2023-02-26 NOTE — H&P ADULT - ASSESSMENT
87 year old man with history of BPH, HTN, T2DM presenting to the hospital with frequent falls found to have ANAM now s/p maldonado initially draining 1000cc of urine. Patient with endorsement of longstanding weakness in his lower extremities possibly contributing to his recent falls, though no obvious deficits on examination. Has evidence of urinary retention based on bladder scan and resultant drainage after maldonado catheter. Could represent product of his BPH versus spinal cord involvement though seems less likely iso his subjective lower extremity weakness being longstanding.

## 2023-02-26 NOTE — H&P ADULT - NSHPPHYSICALEXAM_GEN_ALL_CORE
Vital Signs Last 24 Hrs  T(C): 36.3 (26 Feb 2023 00:18), Max: 37 (26 Feb 2023 00:04)  T(F): 97.3 (26 Feb 2023 00:18), Max: 98.6 (26 Feb 2023 00:04)  HR: 86 (26 Feb 2023 00:18) (71 - 88)  BP: 132/77 (26 Feb 2023 00:18) (126/65 - 149/82)  BP(mean): --  RR: 18 (26 Feb 2023 00:18) (16 - 19)  SpO2: 93% (26 Feb 2023 00:18) (93% - 97%)    Parameters below as of 26 Feb 2023 00:18  Patient On (Oxygen Delivery Method): room air    CONSTITUTIONAL: NAD  HEENT: Moist oral mucosa  RESPIRATORY: Normal respiratory effort, lungs are clear to auscultation bilaterally  CARDIOVASCULAR: Regular rate and irregular rhythm, normal S1 and S2, no murmur/rub/gallop, no lower extremity edema, peripheral pulses are palpable  ABDOMEN: Soft, nondistended, nontender to palpation, normoactive bowel sounds, no rebound/guarding  : Suarez catheter in place  PSYCH: A+O to person, place, and time  NEUROLOGY: Facial expression symmetric, Moves all extremities spontaneously. CNII-XII intact on examination. Motor function with 5/5 strength in the lower extremities b/l. Sensation appears equal in the b/l lower extremities. No obvious deficit appreciated  SKIN: No rashes, no palpable lesions Vital Signs Last 24 Hrs  T(C): 36.3 (26 Feb 2023 00:18), Max: 37 (26 Feb 2023 00:04)  T(F): 97.3 (26 Feb 2023 00:18), Max: 98.6 (26 Feb 2023 00:04)  HR: 86 (26 Feb 2023 00:18) (71 - 88)  BP: 132/77 (26 Feb 2023 00:18) (126/65 - 149/82)  BP(mean): --  RR: 18 (26 Feb 2023 00:18) (16 - 19)  SpO2: 93% (26 Feb 2023 00:18) (93% - 97%)    Parameters below as of 26 Feb 2023 00:18  Patient On (Oxygen Delivery Method): room air    CONSTITUTIONAL: NAD  HEENT: Moist oral mucosa  RESPIRATORY: Normal respiratory effort, lungs are clear to auscultation bilaterally  CARDIOVASCULAR: Regular rate and irregular rhythm, normal S1 and S2, no murmur/rub/gallop, no lower extremity edema, peripheral pulses are palpable  CHEST: No tenderness to palpation along right chest wall at sight of noted rib fracture on CT A/P. No chest wall deformity appreciated  ABDOMEN: Soft, nondistended, nontender to palpation, normoactive bowel sounds, no rebound/guarding  : Suarez catheter in place  PSYCH: A+O to person, place, and time  NEUROLOGY: Facial expression symmetric, Moves all extremities spontaneously. CNII-XII intact on examination. Motor function with 5/5 strength in the lower extremities b/l. Sensation appears equal in the b/l lower extremities. No obvious deficit appreciated  SKIN: No rashes, no palpable lesions

## 2023-02-26 NOTE — H&P ADULT - PROBLEM SELECTOR PLAN 2
- Cr on admission of 3.40, currently unknown baseline. Would need follow up with patient's PCP for outpatient records  - Had moderate blood on UA with only 2 RBCs/hpf, possibly representing myoglobinuria, which could lead to ATN as cause of ANAM. Received 500cc bolus of NS while in the ED. Will F/U CK level  - Also found to be retaining 1000cc urine, which could represent a post-renal cause of patient's ANAM. Daniela now in place  - FENa appears indicative of intrinsic cause  - Monitor I&O, dose meds per eGFR, avoid nephrotoxic agents  - Trend creatinine - Cr on admission of 3.40, Cr of 1.58 in 2019. Likely patient has CKD history, and based on his endorsement of having only one functional kidney  - Had moderate blood on UA with only 2 RBCs/hpf, possibly representing myoglobinuria, which could lead to ATN as cause of ANAM. Received 500cc bolus of NS while in the ED. Will F/U CK level  - Also found to be retaining 1000cc urine, which could represent a post-renal cause of patient's ANAM. Suarez now in place  - FENa appears indicative of intrinsic cause. Possibly medication related  - Monitor I&O, dose meds per eGFR, avoid nephrotoxic agents  - Trend creatinine  - Monitor for signs of post-obstructive diuresis. Per nursing, since draining 1000cc patient has put out 280cc  - TOV when appropriate  - Would possibly need renal consult if no improvement in creatinine  - F/U renal ultrasound

## 2023-02-26 NOTE — PHYSICAL THERAPY INITIAL EVALUATION ADULT - PERTINENT HX OF CURRENT PROBLEM, REHAB EVAL
Pt is an 87 y/p M adm on 2/25/23 with history of BPH, HTN, T2DM presenting to the hospital with frequent falls. Per patient he has fallen 3 times over the past 2 days. Patient endorses that the most recent today was a trip and fall and that the one prior was from bending over to pick things up off the floor. Patient endorses that he has had weakness in his legs for "as long as he can remember", but that no one was able to find out why he has it. He denied loss of consciousness. States he uses a cane to ambulate at baseline and endorses that he would be getting new one with the four prongs to help him more. Endorses he doesn't feel like his balance is worse than usual. Denied chest pain, headaches, shortness of breath, abdominal pain, nausea or vomiting. Endorsed his appetite has never been great and that he isn't a big eater at baseline. Endorsed good intake of fluids. States he has one kidney that doesn't function properly at baseline. He stated he has still been having bowel movements, and said he couldn't say whether he had a change in frequency of urination. Pt is an 87 y/p M adm on 2/25/23 with history of BPH, HTN, T2DM presenting to the hospital with frequent falls. Per patient he has fallen 3 times over the past 2 days. Patient endorses that the most recent today was a trip and fall and that the one prior was from bending over to pick things up off the floor. Patient endorses that he has had weakness in his legs for "as long as he can remember", but that no one was able to find out why he has it. He denied loss of consciousness. States he uses a cane to ambulate at baseline and endorses that he would be getting new one with the four prongs to help him more. Endorses he doesn't feel like his balance is worse than usual. Denied chest pain, headaches, shortness of breath, abdominal pain, nausea or vomiting. Endorsed his appetite has never been great and that he isn't a big eater at baseline. Endorsed good intake of fluids. States he has one kidney that doesn't function properly at baseline. He stated he has still been having bowel movements, and said he couldn't say whether he had a change in frequency of urination. HC: xrays pelvis: No acute fracture or dislocation. Right VERONA. CT Head: No acuteintra-cranial hemorrhage, mass effect, or midline shift. CT Cervical Spine:  No acute fracture or traumatic subluxation. There are multi-level degenerative changes of the cervical spine.

## 2023-02-26 NOTE — H&P ADULT - PROBLEM SELECTOR PLAN 1
- Endorsed by patient to have been three falls in the last two days with most recent being a trip and fall while at home  - He endorsed a longstanding weakness in his legs that he states was never able to be explained. He denied numbness, tingling sensation or pain in the legs. On exam strength equal in the lower extremities  - Despite not having anemia does have elevated MCV, possibly indicating B12/folate deficiency. Will order with AM labs to assess, though given no endorsement of numbness/tingling, less likely a peripheral neuropathy contributing  - Will need collateral from family to further explore patient's history and his falls. Unable to reach them at time of admission  - Fall precautions  - PT consult placed  - Social work consult placed in ED - Endorsed by patient to have been three falls in the last two days with most recent being a trip and fall while at home  - He endorsed a longstanding weakness in his legs that he states was never able to be explained. He denied numbness, tingling sensation or pain in the legs. On exam strength equal in the lower extremities  - Despite not having anemia does have elevated MCV, possibly indicating B12/folate deficiency. Will order with AM labs to assess, though given no endorsement of numbness/tingling, less likely a peripheral neuropathy contributing  - CT head/C-spine without acute findings  - Will need collateral from family to further explore patient's history and his falls. Unable to reach them at time of admission  - Fall precautions  - PT consult placed  - Social work consult placed in ED - Endorsed by patient to have been three falls in the last two days with most recent being a trip and fall while at home  - He endorsed a longstanding weakness in his legs that he states was never able to be explained. He denied numbness, tingling sensation or pain in the legs. On exam strength equal in the lower extremities  - Despite not having anemia does have elevated MCV, possibly indicating B12/folate deficiency. Will order with AM labs to assess, though given no endorsement of numbness/tingling, less likely a peripheral neuropathy contributing  - F/U TSH  - Followed by EP. Had exercise stress test significant for runs of NSVT  - Possibly due to polypharmacy. Noted to be on farxiga, ramipril, amlodipine, per EP note from this month  - F/U orthostatic testing  - CT head/C-spine without acute findings  - Will need collateral from family to further explore patient's history and his falls. Unable to reach them at time of admission  - Fall precautions  - PT consult placed  - Social work consult placed in ED - Endorsed by patient to have been three falls in the last two days with most recent being a trip and fall while at home  - He endorsed a longstanding weakness in his legs that he states was never able to be explained. He denied numbness, tingling sensation or pain in the legs. On exam strength equal in the lower extremities  - Despite not having anemia does have elevated MCV, possibly indicating B12/folate deficiency. Will order with AM labs to assess, though given no endorsement of numbness/tingling, less likely a peripheral neuropathy contributing  - F/U TSH  - Followed by EP. Had exercise stress test significant for runs of NSVT  - Possibly due to polypharmacy. Noted to be on farxiga, ramipril, amlodipine, tamsulosin  - F/U orthostatic testing  - CT head/C-spine without acute findings  - Will need collateral from family to further explore patient's history and his falls. Unable to reach them at time of admission  - Fall precautions  - PT consult placed  - Social work consult placed in ED

## 2023-02-26 NOTE — H&P ADULT - PROBLEM SELECTOR PLAN 6
- DVT: Heparin 5000U q8  - Diet: DASH/TLC  - 70cc/hr LR for 4 hours, then will encourage PO intake   - Pharmacy: [facility] in [place] per patient

## 2023-02-26 NOTE — PROGRESS NOTE ADULT - PROBLEM SELECTOR PLAN 1
- Endorsed by patient to have been three falls in the last two days with most recent being a trip and fall while at home  - He endorsed a longstanding weakness in his legs that he states was never able to be explained. He denied numbness, tingling sensation or pain in the legs. On exam strength equal in the lower extremities  - Despite not having anemia does have elevated MCV, possibly indicating B12/folate deficiency. Will order with AM labs to assess, though given no endorsement of numbness/tingling, less likely a peripheral neuropathy contributing  - F/U TSH  - Followed by EP. Had exercise stress test significant for runs of NSVT  - Possibly due to polypharmacy. Noted to be on farxiga, ramipril, amlodipine, tamsulosin  - F/U orthostatic testing  - CT head/C-spine without acute findings  - Will need collateral from family to further explore patient's history and his falls. Unable to reach them at time of admission  - Fall precautions  - PT consult placed  - Social work consult placed in ED - Endorsed by patient to have been three falls in the last two days with most recent being a trip and fall while at home  - He endorsed a longstanding weakness in his legs that he states was never able to be explained. He denied numbness, tingling sensation or pain in the legs. On exam strength equal in the lower extremities  - Patient has good recollection of events, all seem to be mechanical in nature or 2/2 to lower extremity weakness; no LOC or seizure like activity  - Likely due to polypharmacy. Patient on multiple medications that may be contributory; acebutolol, actos, farxiga, livalo, ramipril, amlodipine, tamsulosin, finasteride. Of note, many of these medications are not standard of care, and some have been recalled. Will encourage patine to follow with a new PCP and will refer to an in-house geriatrician  - Orhtostatics negative  - CT head/C-spine without acute findings  - Follow up B12/folate given macrocytosis  - Fall precautions  - PT recommending home with walker

## 2023-02-26 NOTE — H&P ADULT - PROBLEM SELECTOR PLAN 5
- DVT: Heparin 5000U q8  - Diet: DASH/TLC    - Pharmacy: [facility] in [place] per patient - Fingersticks pre meal and at bedtime  - Assess for need for sliding scale insulin once patient is eating  - F/U A1C

## 2023-02-26 NOTE — H&P ADULT - NSICDXPASTSURGICALHX_GEN_ALL_CORE_FT
yes
PAST SURGICAL HISTORY:  Anal fistula s/p surgery    Hx of left hemicolectomy 2012    S/P ERCP 6/30/19 (S/P cholecystomy tube placement) 7/2/19 remove gallstones    S/P hernia repair bilateral 2013    S/P hip replacement, right 2017    S/P tonsillectomy childhood    S/P TURP x 2

## 2023-02-26 NOTE — PROGRESS NOTE ADULT - PROBLEM SELECTOR PLAN 5
- Fingersticks pre meal and at bedtime  - Assess for need for sliding scale insulin once patient is eating  - F/U A1C Patient reports well controlled T2DM, A1C of 5.2  - Patient is on dapagiflozin and pioglitazone at home despite well controlled T2DM  - Fingersticks pre meal and at bedtime  - Assess for need for sliding scale insulin once patient is eating  - F/U repeat A1C

## 2023-02-26 NOTE — PHYSICAL THERAPY INITIAL EVALUATION ADULT - ADDITIONAL COMMENTS
Pt lives alone in split level house with 6 steps to enter and 4 steps up/down. Bathroom, bedroom, living room and kitchen all on 1 level. Only need to go downstairs to check email.

## 2023-02-26 NOTE — PROGRESS NOTE ADULT - PROBLEM SELECTOR PLAN 6
- DVT: Heparin 5000U q8  - Diet: DASH/TLC  - 70cc/hr LR for 4 hours, then will encourage PO intake   - Pharmacy: [facility] in [place] per patient - DVT: Heparin 5000U q8  - Diet: DASH/TLC  - [facility] [place] per patient

## 2023-02-26 NOTE — PROGRESS NOTE ADULT - PROBLEM SELECTOR PLAN 3
- Calcium of 12.5 on admission  - F/U PTH, ionized calcium, 1,25 vitamin D - Calcium of 12.5 on admission; possibly indicative of advancing CKD  - F/U PTH, ionized calcium, 1,25 vitamin D

## 2023-02-26 NOTE — PATIENT PROFILE ADULT - LIVING ENVIRONMENT
Received refill request from [facility] for Albuterol (PROVENTIL HFA) 108 (90 Base) MCG/ACT   Last refill: 9/14/17 #3 R-3  Last office visit: 9/14/17  Pt was advised to RTC in 4 months.      Contacted [facility] pharmacy and pt has refills available on file.  This request will be closed.      no

## 2023-02-26 NOTE — H&P ADULT - NSHPREVIEWOFSYSTEMS_GEN_ALL_CORE
Constitutional/General: No acute distress   Eyes: No changes in vision, double vision, blurry vision  ENT: No nasal congestion or rhinorrhea, changes in hearing, hearing aids, odynophagia or dysphagia   Skin: No rashes  Cardiovascular: No chest pain, heart palpitations, orthopnea/PND  Pulmonary: No shortness of breath, cough, pleuritic chest pain, hemoptysis   Gastrointestinal: No nausea, vomiting, diarrhea, bloating, constipation, abdominal pain  Genitourinary: No dysuria, frequency, change in urine odor/appearance   Musculoskeletal: No changes in strength, joint tenderness or swelling  Neurologic: No changes in memory, headache, weakness, paresthesias, imbalance   Endocrine: No heat/cold intolerance, weight change, excessive sweating, polydipsia/polyuria  Psychology: No changes in mood, anxiety, depression.  Heme/Lymph: No easy bruising

## 2023-02-26 NOTE — H&P ADULT - HISTORY OF PRESENT ILLNESS
87 year old man with history of BPH, HTN, T2DM presenting to the hospital with frequent falls. Per patient he has fallen 3 times over the past 2 days. Patient endorses that the most recent today was a trip and fall and that the one prior was from bending over to pick things up off the floor. Patient endorses that he has had weakness in his legs for "as long as he can remember", but that no one was able to find out why he has it. He denied loss of consciousness. States he uses a cane to ambulate at baseline and endorses that he would be getting new one with the four prongs to help him more. Endorses he doesn't feel like his balance is worse than usual. Denied chest pain, headaches, shortness of breath, abdominal pain, nausea or vomiting. Endorsed his appetite has never been great and that he isn't a big eater at baseline. Endorsed good intake of fluids. States he has one kidney that doesn't function properly at baseline. He stated he has still been having bowel movements, and said he couldn't say whether he had a change in frequency of urination.     Attempted to contact family members (son and daughter) for collateral at listed number without success    In the ED, patient hemodynamically stable and afebrile. Found to have leukocytosis to 14, and elevated BUN/Cr of 56/3.40 with no prior records for baseline comparison. UA significant for glucose and protein, with moderate blood on dip, but only 2 RBCs/hpf. Bladder scan showed 600cc urine, had maldonado placed, drained 1000cc. Had CT head/C-spine done which demonstrated no acute bleed, mass effect or midline shift, no acute fractures or traumatic subluxation, and chronic degenerative changes of the c-spine. CT A/P demonstrated acute vs subacute right 8th rib fracture and and possible portal venus gas. Given 500cc NS bolus.

## 2023-02-26 NOTE — H&P ADULT - PROBLEM SELECTOR PLAN 3
- Hemodynamically stable currently with regard to BP  - Will need medication rec for current BP meds as patient does not remember. Can then observe for need to restart while inpatient based on patient's trend - Calcium of 12.5 on admission  - F/U PTH, ionized calcium, 1,25 vitamin D

## 2023-02-26 NOTE — H&P ADULT - ATTENDING COMMENTS
87 y M PW: 3 falls today   On arrival to ED VS: Afebrile, HR: 86, BP: 126/65, saturating 95% on RA   Significant labs: WBC 14.13, BUN 56, Scr 3.40, Calcium 12.9, GFR 17  Imaging:   CT H: No acute intra-cranial hemorrhage, mass effect, or midline shift.  CT Cervical Spine:  No acute fracture or traumatic subluxation, multi-level degenerative changes of the cervical spine.  CTAP:  Linear foci of air in the liver worrisome for portal venous gas. Right eighth rib fracture appears acute to subacute.  XR Hip: No acute fracture or dislocation. Right VERONA.  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 Patient endorsed 1-2 falls mechanical, also endorsed feeling lightheaded when standing- takes farxiga at home.   -Check orthostatics, obtain collateral from EP doctor (outpatient)  -Gentle hydration   -PT eval    #Hypercalcemia   -Check ionized calcium, PTH, vitamin D levels  -gentle hydration     Resume home meds once confirmed   Rest as above 87 y M PW: 3 over 2-3 days (does not clearly elaborate, states 1 fall he tripped, 2nd he bent over to pick something, 3rd did not give more details)     On arrival to ED VS: Afebrile, HR: 86, BP: 126/65, saturating 95% on RA   Significant labs: WBC 14.13, BUN 56, Scr 3.40, Calcium 12.9, GFR 17  Imaging:   CT H: No acute intra-cranial hemorrhage, mass effect, or midline shift.  CT Cervical Spine:  No acute fracture or traumatic subluxation, multi-level degenerative changes of the cervical spine.  CTAP:  Linear foci of air in the liver worrisome for portal venous gas. Right eighth rib fracture appears acute to subacute.  XR Hip: No acute fracture or dislocation. Right VERONA.  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 Patient endorsed 1-2 falls mechanical, also endorsed feeling lightheaded when standing- takes farxiga at home.   -Check orthostatics, obtain collateral from EP doctor (outpatient)  -Gentle hydration   -PT eval    #Hypercalcemia   -Check ionized calcium, PTH, vitamin D levels  -gentle hydration     Resume home meds once confirmed   Rest as above 87 y M PW: 3 over 2-3 days (does not clearly elaborate, states 1 fall he tripped, 2nd he bent over to pick something, 3rd did not give more details)     On arrival to ED VS: Afebrile, HR: 86, BP: 126/65, saturating 95% on RA   Significant labs: WBC 14.13, BUN 56, Scr 3.40, Calcium 12.9, GFR 17  Imaging:   CT H: No acute intra-cranial hemorrhage, mass effect, or midline shift.  CT Cervical Spine:  No acute fracture or traumatic subluxation, multi-level degenerative changes of the cervical spine.  CTAP:  Linear foci of air in the liver worrisome for portal venous gas. Right eighth rib fracture appears acute to subacute.  XR Hip: No acute fracture or dislocation. Right VERONA.  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 Patient endorsed 1-2 falls mechanical, also endorsed feeling lightheaded when standing- takes farxiga at home.   -Check orthostatics, obtain collateral from EP doctor (outpatient)  -Gentle hydration   -PT eval    #Hypercalcemia   -Check ionized calcium, PTH, vitamin D levels  -gentle hydration     Resume home meds once confirmed   Rest as above

## 2023-02-26 NOTE — PROGRESS NOTE ADULT - PROBLEM SELECTOR PLAN 4
- Hemodynamically stable currently with regard to BP  - Will need medication rec for current BP meds as patient does not remember. Can then observe for need to restart while inpatient based on patient's trend Patient on home amlodipine 5 mg qd, and ramipril 2.5 qd  - Patient's SBP 150s  - Resume home amlodipine

## 2023-02-27 LAB
ANION GAP SERPL CALC-SCNC: 13 MMOL/L — SIGNIFICANT CHANGE UP (ref 5–17)
BUN SERPL-MCNC: 45 MG/DL — HIGH (ref 7–23)
CALCIUM SERPL-MCNC: 10.7 MG/DL — HIGH (ref 8.4–10.5)
CHLORIDE SERPL-SCNC: 106 MMOL/L — SIGNIFICANT CHANGE UP (ref 96–108)
CO2 SERPL-SCNC: 21 MMOL/L — LOW (ref 22–31)
CREAT SERPL-MCNC: 2.34 MG/DL — HIGH (ref 0.5–1.3)
EGFR: 27 ML/MIN/1.73M2 — LOW
FOLATE SERPL-MCNC: >20 NG/ML — SIGNIFICANT CHANGE UP
GLUCOSE BLDC GLUCOMTR-MCNC: 100 MG/DL — HIGH (ref 70–99)
GLUCOSE BLDC GLUCOMTR-MCNC: 104 MG/DL — HIGH (ref 70–99)
GLUCOSE BLDC GLUCOMTR-MCNC: 118 MG/DL — HIGH (ref 70–99)
GLUCOSE BLDC GLUCOMTR-MCNC: 119 MG/DL — HIGH (ref 70–99)
GLUCOSE SERPL-MCNC: 122 MG/DL — HIGH (ref 70–99)
HCT VFR BLD CALC: 48.6 % — SIGNIFICANT CHANGE UP (ref 39–50)
HGB BLD-MCNC: 15.6 G/DL — SIGNIFICANT CHANGE UP (ref 13–17)
MAGNESIUM SERPL-MCNC: 1.8 MG/DL — SIGNIFICANT CHANGE UP (ref 1.6–2.6)
MCHC RBC-ENTMCNC: 32.1 GM/DL — SIGNIFICANT CHANGE UP (ref 32–36)
MCHC RBC-ENTMCNC: 34.1 PG — HIGH (ref 27–34)
MCV RBC AUTO: 106.1 FL — HIGH (ref 80–100)
NRBC # BLD: 0 /100 WBCS — SIGNIFICANT CHANGE UP (ref 0–0)
PHOSPHATE SERPL-MCNC: 2.1 MG/DL — LOW (ref 2.5–4.5)
PLATELET # BLD AUTO: 299 K/UL — SIGNIFICANT CHANGE UP (ref 150–400)
POTASSIUM SERPL-MCNC: 3.9 MMOL/L — SIGNIFICANT CHANGE UP (ref 3.5–5.3)
POTASSIUM SERPL-SCNC: 3.9 MMOL/L — SIGNIFICANT CHANGE UP (ref 3.5–5.3)
PTH-INTACT FLD-MCNC: 44 PG/ML — SIGNIFICANT CHANGE UP (ref 15–65)
RBC # BLD: 4.58 M/UL — SIGNIFICANT CHANGE UP (ref 4.2–5.8)
RBC # FLD: 14.7 % — HIGH (ref 10.3–14.5)
SODIUM SERPL-SCNC: 140 MMOL/L — SIGNIFICANT CHANGE UP (ref 135–145)
TSH SERPL-MCNC: 2.26 UIU/ML — SIGNIFICANT CHANGE UP (ref 0.27–4.2)
VIT B12 SERPL-MCNC: 1810 PG/ML — HIGH (ref 232–1245)
VIT D25+D1,25 OH+D1,25 PNL SERPL-MCNC: 19.4 PG/ML — LOW (ref 19.9–79.3)
WBC # BLD: 9.58 K/UL — SIGNIFICANT CHANGE UP (ref 3.8–10.5)
WBC # FLD AUTO: 9.58 K/UL — SIGNIFICANT CHANGE UP (ref 3.8–10.5)

## 2023-02-27 PROCEDURE — 76770 US EXAM ABDO BACK WALL COMP: CPT | Mod: 26

## 2023-02-27 PROCEDURE — 99232 SBSQ HOSP IP/OBS MODERATE 35: CPT | Mod: GC

## 2023-02-27 RX ORDER — SODIUM,POTASSIUM PHOSPHATES 278-250MG
1 POWDER IN PACKET (EA) ORAL ONCE
Refills: 0 | Status: COMPLETED | OUTPATIENT
Start: 2023-02-27 | End: 2023-02-27

## 2023-02-27 RX ORDER — SENNA PLUS 8.6 MG/1
2 TABLET ORAL AT BEDTIME
Refills: 0 | Status: DISCONTINUED | OUTPATIENT
Start: 2023-02-27 | End: 2023-03-01

## 2023-02-27 RX ORDER — TAMSULOSIN HYDROCHLORIDE 0.4 MG/1
0.4 CAPSULE ORAL AT BEDTIME
Refills: 0 | Status: DISCONTINUED | OUTPATIENT
Start: 2023-02-27 | End: 2023-03-01

## 2023-02-27 RX ORDER — POLYETHYLENE GLYCOL 3350 17 G/17G
17 POWDER, FOR SOLUTION ORAL
Refills: 0 | Status: DISCONTINUED | OUTPATIENT
Start: 2023-02-27 | End: 2023-03-01

## 2023-02-27 RX ORDER — AMLODIPINE BESYLATE 2.5 MG/1
10 TABLET ORAL DAILY
Refills: 0 | Status: DISCONTINUED | OUTPATIENT
Start: 2023-02-27 | End: 2023-03-01

## 2023-02-27 RX ADMIN — HEPARIN SODIUM 5000 UNIT(S): 5000 INJECTION INTRAVENOUS; SUBCUTANEOUS at 13:23

## 2023-02-27 RX ADMIN — POLYETHYLENE GLYCOL 3350 17 GRAM(S): 17 POWDER, FOR SOLUTION ORAL at 17:23

## 2023-02-27 RX ADMIN — AMLODIPINE BESYLATE 5 MILLIGRAM(S): 2.5 TABLET ORAL at 05:14

## 2023-02-27 RX ADMIN — Medication 3 MILLIGRAM(S): at 22:44

## 2023-02-27 RX ADMIN — HEPARIN SODIUM 5000 UNIT(S): 5000 INJECTION INTRAVENOUS; SUBCUTANEOUS at 05:14

## 2023-02-27 RX ADMIN — SENNA PLUS 2 TABLET(S): 8.6 TABLET ORAL at 22:44

## 2023-02-27 RX ADMIN — HEPARIN SODIUM 5000 UNIT(S): 5000 INJECTION INTRAVENOUS; SUBCUTANEOUS at 22:44

## 2023-02-27 RX ADMIN — Medication 1 PACKET(S): at 14:06

## 2023-02-27 RX ADMIN — TAMSULOSIN HYDROCHLORIDE 0.4 MILLIGRAM(S): 0.4 CAPSULE ORAL at 22:43

## 2023-02-27 RX ADMIN — AMLODIPINE BESYLATE 10 MILLIGRAM(S): 2.5 TABLET ORAL at 08:51

## 2023-02-27 NOTE — PROGRESS NOTE ADULT - PROBLEM SELECTOR PLAN 6
- DVT: Heparin 5000U q8  - Diet: DASH/TLC  - [facility] [place] per [facility] DVT PPx: heparin subq  Diet: DASH/TLC  Code: FULL  Dispo: pending clinical improvement  Communication: discussed with patient's son at bedside 2/27    Discharge information:  Pharmacy: [facility] in [place] per patient  PCP: Rufino Dobbins, patient willing to transition care to [facility]

## 2023-02-27 NOTE — PROGRESS NOTE ADULT - PROBLEM SELECTOR PLAN 5
Patient reports well controlled T2DM, A1C of 5.2  - Patient is on dapagiflozin and pioglitazone at home despite well controlled T2DM  - Fingersticks pre meal and at bedtime  - Assess for need for sliding scale insulin once patient is eating  - F/U repeat A1C Patient reports well controlled T2DM, reports A1C of 5.2  - Patient is on dapagiflozin and pioglitazone at home despite well controlled T2DM  - Fingersticks pre meal and at bedtime  - Assess for need for sliding scale insulin once patient is eating  - F/U repeat A1C

## 2023-02-27 NOTE — PROGRESS NOTE ADULT - PROBLEM SELECTOR PLAN 1
- Endorsed by patient to have been three falls in the last two days with most recent being a trip and fall while at home  - He endorsed a longstanding weakness in his legs that he states was never able to be explained. He denied numbness, tingling sensation or pain in the legs. On exam strength equal in the lower extremities  - Patient has good recollection of events, all seem to be mechanical in nature or 2/2 to lower extremity weakness; no LOC or seizure like activity  - Likely due to polypharmacy. Patient on multiple medications that may be contributory; acebutolol, actos, farxiga, livalo, ramipril, amlodipine, tamsulosin, finasteride. Of note, many of these medications are not standard of care, and some have been recalled. Will encourage patine to follow with a new PCP and will refer to an in-house geriatrician  - Orhtostatics negative  - CT head/C-spine without acute findings  - Follow up B12/folate given macrocytosis  - Fall precautions  - PT recommending home with walker - Endorsed by patient to have been three falls in the last two days with most recent being a trip and fall while at home  - He endorsed a longstanding weakness in his legs that he states was never able to be explained. He denied numbness, tingling sensation or pain in the legs. On exam strength equal in the lower extremities  - Patient has good recollection of events, all seem to be mechanical in nature or 2/2 to lower extremity weakness; no LOC or seizure like activity  - Likely due to polypharmacy. Patient on multiple medications that may be contributory; acebutolol, actos, farxiga, livalo, ramipril, amlodipine, tamsulosin, finasteride. Of note, many of these medications are not standard of care, and some have been recalled. Will encourage patine to follow with a new PCP and will refer to an in-house geriatrician  - Orthostatics negative  - Fall precautions  - PT recommending home with walker

## 2023-02-27 NOTE — PROGRESS NOTE ADULT - PROBLEM SELECTOR PLAN 2
- Cr on admission of 3.40, Cr of 1.58 in 2019. 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 Possibly medication related  - Monitor I&O, dose meds per eGFR, avoid nephrotoxic agents  - Trend creatinine  - Monitor for signs of post-obstructive diuresis. Per nursing, since draining 1000cc patient has put out 280cc  - TOV when appropriate  - F/U renal ultrasound - Cr on admission of 3.40, Cr of 1.58 in 2019. 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 Possibly medication related  - Monitor I&O, dose meds per eGFR, avoid nephrotoxic agents  - Monitor for signs of post-obstructive diuresis. Per nursing, since draining 1000cc patient has put out 280cc  - TOV when appropriate  - Renal ultrasound consistent with chronic medical renal disease

## 2023-02-27 NOTE — PROGRESS NOTE ADULT - SUBJECTIVE AND OBJECTIVE BOX
PROGRESS NOTE:     Patient is a 87y old  Male who presents with a chief complaint of ANAM (2023 07:03)      ---------------------------------------------------  Tonia May  PGY-1, Internal Medicine  Available on [employer] Teams  Pager: [phone] (SAM), or [phone] (NS)  ---------------------------------------------------    SUBJECTIVE / OVERNIGHT EVENTS:    No acute events overnight. Patient examined at bedside with no acute complaints.     Pain:  Bowel Movements:  Urination:  OOB:  PT:    REVIEW OF SYSTEMS:    CONSTITUTIONAL: No weakness, fevers or chills  EYES/ENT: No visual changes;  No vertigo or throat pain   NECK: No pain or stiffness  RESPIRATORY: No cough, wheezing, hemoptysis; No shortness of breath  CARDIOVASCULAR: No chest pain or palpitations  GASTROINTESTINAL: No abdominal or epigastric pain. No nausea, vomiting, or hematemesis; No diarrhea or constipation. No melena or hematochezia.  GENITOURINARY: No dysuria, frequency or hematuria  NEUROLOGICAL: No numbness or weakness  SKIN: No itching, rashes      MEDICATIONS  (STANDING):  amLODIPine   Tablet 5 milliGRAM(s) Oral daily  coronavirus bivalent (EUA) Booster Vaccine (PFIZER) 0.3 milliLiter(s) IntraMuscular once  dextrose 5% + lactated ringers. 1000 milliLiter(s) (75 mL/Hr) IV Continuous <Continuous>  dextrose 5%. 1000 milliLiter(s) (50 mL/Hr) IV Continuous <Continuous>  dextrose 5%. 1000 milliLiter(s) (100 mL/Hr) IV Continuous <Continuous>  dextrose 5%. 1000 milliLiter(s) (50 mL/Hr) IV Continuous <Continuous>  dextrose 5%. 1000 milliLiter(s) (100 mL/Hr) IV Continuous <Continuous>  dextrose 50% Injectable 25 Gram(s) IV Push once  dextrose 50% Injectable 12.5 Gram(s) IV Push once  dextrose 50% Injectable 25 Gram(s) IV Push once  dextrose 50% Injectable 25 Gram(s) IV Push once  dextrose 50% Injectable 12.5 Gram(s) IV Push once  dextrose 50% Injectable 25 Gram(s) IV Push once  glucagon  Injectable 1 milliGRAM(s) IntraMuscular once  glucagon  Injectable 1 milliGRAM(s) IntraMuscular once  heparin   Injectable 5000 Unit(s) SubCutaneous every 8 hours  insulin lispro (ADMELOG) corrective regimen sliding scale   SubCutaneous three times a day before meals  insulin lispro (ADMELOG) corrective regimen sliding scale   SubCutaneous at bedtime  lactated ringers. 500 milliLiter(s) (70 mL/Hr) IV Continuous <Continuous>    MEDICATIONS  (PRN):  dextrose Oral Gel 15 Gram(s) Oral once PRN Blood Glucose LESS THAN 70 milliGRAM(s)/deciliter  dextrose Oral Gel 15 Gram(s) Oral once PRN Blood Glucose LESS THAN 70 milliGRAM(s)/deciliter  melatonin 3 milliGRAM(s) Oral at bedtime PRN Sleep      CAPILLARY BLOOD GLUCOSE      POCT Blood Glucose.: 171 mg/dL (2023 21:39)  POCT Blood Glucose.: 115 mg/dL (2023 17:49)    I&O's Summary    2023 07:01  -  2023 07:00  --------------------------------------------------------  IN: 690 mL / OUT: 1800 mL / NET: -1110 mL        VITALS:   T(C): 36.7 (23 @ 05:29), Max: 36.7 (23 @ 21:42)  HR: 77 (23 @ 05:29) (77 - 95)  BP: 157/81 (23 @ 05:29) (149/72 - 159/83)  RR: 18 (23 @ 05:29) (18 - 18)  SpO2: 92% (23 @ 05:29) (91% - 95%)    GENERAL: NAD, lying in bed comfortably  HEAD:  Atraumatic, normocephalic  EYES: EOMI, PERRLA, conjunctiva and sclera clear  ENT: Moist mucous membranes  NECK: Supple, no JVD  HEART: Regular rate and rhythm, no murmurs, rubs, or gallops  LUNGS: Unlabored respirations.  Clear to auscultation bilaterally, no crackles, wheezing, or rhonchi  ABDOMEN: Soft, nontender, nondistended, +BS  EXTREMITIES: 2+ peripheral pulses bilaterally. No clubbing, cyanosis, or edema  NERVOUS SYSTEM:  A&Ox3, no focal deficits   SKIN: No rashes or lesions    LABS:                        16.0   11.02 )-----------( 298      ( 2023 06:27 )             48.5         144  |  108  |  50<H>  ----------------------------<  87  4.0   |  21<L>  |  3.06<H>    Ca    12.2<H>      2023 06:27  Phos  4.7       Mg     2.3         TPro  6.2  /  Alb  4.0  /  TBili  0.6  /  DBili  x   /  AST  51<H>  /  ALT  28  /  AlkPhos  66  02-26    PT/INR - ( 2023 20:14 )   PT: 11.4 sec;   INR: 0.98 ratio         PTT - ( 2023 20:14 )  PTT:30.9 sec      Urinalysis Basic - ( 2023 17:50 )    Color: Light Yellow / Appearance: Clear / S.017 / pH: x  Gluc: x / Ketone: Trace  / Bili: Negative / Urobili: Negative   Blood: x / Protein: 100 mg/dL / Nitrite: Negative   Leuk Esterase: Negative / RBC: 2 /hpf / WBC 1 /HPF   Sq Epi: x / Non Sq Epi: 1 /hpf / Bacteria: Negative        Culture - Urine (collected 2023 17:53)  Source: Clean Catch Clean Catch (Midstream)  Final Report (2023 20:57):    No growth        RADIOLOGY & ADDITIONAL TESTS:  Results Reviewed:   Imaging Personally Reviewed:  Electrocardiogram Personally Reviewed:    COORDINATION OF CARE:  Care Discussed with Consultants/Other Providers [Y/N]:  Prior or Outpatient Records Reviewed [Y/N]:     PROGRESS NOTE:     Patient is a 87y old  Male who presents with a chief complaint of ANAM (2023 07:03)      ---------------------------------------------------  Tonia May  PGY-1, Internal Medicine  Available on [employer] Teams  Pager: [phone] (SAM), or [phone] (NS)  ---------------------------------------------------    SUBJECTIVE / OVERNIGHT EVENTS:    No acute events overnight. Patient examined at bedside with no acute complaints. Reports that he was able to walk to the comode with his walker and felt steady on his feet while doing so.      MEDICATIONS  (STANDING):  amLODIPine   Tablet 5 milliGRAM(s) Oral daily  coronavirus bivalent (EUA) Booster Vaccine (PFIZER) 0.3 milliLiter(s) IntraMuscular once  dextrose 5% + lactated ringers. 1000 milliLiter(s) (75 mL/Hr) IV Continuous <Continuous>  dextrose 5%. 1000 milliLiter(s) (50 mL/Hr) IV Continuous <Continuous>  dextrose 5%. 1000 milliLiter(s) (100 mL/Hr) IV Continuous <Continuous>  dextrose 5%. 1000 milliLiter(s) (50 mL/Hr) IV Continuous <Continuous>  dextrose 5%. 1000 milliLiter(s) (100 mL/Hr) IV Continuous <Continuous>  dextrose 50% Injectable 25 Gram(s) IV Push once  dextrose 50% Injectable 12.5 Gram(s) IV Push once  dextrose 50% Injectable 25 Gram(s) IV Push once  dextrose 50% Injectable 25 Gram(s) IV Push once  dextrose 50% Injectable 12.5 Gram(s) IV Push once  dextrose 50% Injectable 25 Gram(s) IV Push once  glucagon  Injectable 1 milliGRAM(s) IntraMuscular once  glucagon  Injectable 1 milliGRAM(s) IntraMuscular once  heparin   Injectable 5000 Unit(s) SubCutaneous every 8 hours  insulin lispro (ADMELOG) corrective regimen sliding scale   SubCutaneous three times a day before meals  insulin lispro (ADMELOG) corrective regimen sliding scale   SubCutaneous at bedtime  lactated ringers. 500 milliLiter(s) (70 mL/Hr) IV Continuous <Continuous>    MEDICATIONS  (PRN):  dextrose Oral Gel 15 Gram(s) Oral once PRN Blood Glucose LESS THAN 70 milliGRAM(s)/deciliter  dextrose Oral Gel 15 Gram(s) Oral once PRN Blood Glucose LESS THAN 70 milliGRAM(s)/deciliter  melatonin 3 milliGRAM(s) Oral at bedtime PRN Sleep      CAPILLARY BLOOD GLUCOSE      POCT Blood Glucose.: 171 mg/dL (2023 21:39)  POCT Blood Glucose.: 115 mg/dL (2023 17:49)    I&O's Summary    2023 07:01  -  2023 07:00  --------------------------------------------------------  IN: 690 mL / OUT: 1800 mL / NET: -1110 mL        VITALS:   T(C): 36.7 (23 @ 05:29), Max: 36.7 (23 @ 21:42)  HR: 77 (23 @ 05:29) (77 - 95)  BP: 157/81 (23 @ 05:29) (149/72 - 159/83)  RR: 18 (23 @ 05:29) (18 - 18)  SpO2: 92% (23 @ 05:29) (91% - 95%)    GENERAL: NAD, lying in bed comfortably  HEAD:  Atraumatic, normocephalic  EYES: EOMI, PERRLA, conjunctiva and sclera clear  ENT: Moist mucous membranes  NECK: Supple, no JVD  HEART: Regular rate and rhythm, no murmurs, rubs, or gallops  LUNGS: Unlabored respirations.  Clear to auscultation bilaterally, no crackles, wheezing, or rhonchi  ABDOMEN: Soft, nontender, nondistended, +BS  EXTREMITIES: 2+ peripheral pulses bilaterally. No clubbing, cyanosis, or edema  NERVOUS SYSTEM:  A&Ox3, no focal deficits   SKIN: No rashes or lesions          LABS:                          15.6   9.58  )-----------( 299      ( 2023 12:12 )             48.6         140  |  106  |  45<H>  ----------------------------<  122<H>  3.9   |  21<L>  |  2.34<H>    Ca    10.7<H>      2023 12:12  Phos  2.1       Mg     1.8         TPro  6.2  /  Alb  4.0  /  TBili  0.6  /  DBili  x   /  AST  51<H>  /  ALT  28  /  AlkPhos  66      PT/INR - ( 2023 20:14 )   PT: 11.4 sec;   INR: 0.98 ratio         PTT - ( 2023 20:14 )  PTT:30.9 sec            Urinalysis Basic - ( 2023 17:50 )    Color: Light Yellow / Appearance: Clear / S.017 / pH: x  Gluc: x / Ketone: Trace  / Bili: Negative / Urobili: Negative   Blood: x / Protein: 100 mg/dL / Nitrite: Negative   Leuk Esterase: Negative / RBC: 2 /hpf / WBC 1 /HPF   Sq Epi: x / Non Sq Epi: 1 /hpf / Bacteria: Negative        Culture - Urine (collected 2023 17:53)  Source: Clean Catch Clean Catch (Midstream)  Final Report (2023 20:57):    No growth        RADIOLOGY & ADDITIONAL TESTS:    < from: US Kidney and Bladder (23 @ 16:53) >  IMPRESSION:  No evidence for bilateral hydronephrosis. Bilateral renal cysts. Renal   parenchymal thinning with lobulated morphology identified. Increased   echotexture of the renal parenchyma suggests underlying chronic medical   renal disease.    < end of copied text >

## 2023-02-27 NOTE — PROGRESS NOTE ADULT - PROBLEM SELECTOR PLAN 4
Patient on home amlodipine 5 mg qd, and ramipril 2.5 qd  - Patient's SBP 150s  - Resume home amlodipine Patient on home amlodipine 5 mg qd, and ramipril 2.5 qd  - Patient's SBP 150s  - Resume home amlodipine, dose increased to 10 qd

## 2023-02-27 NOTE — PROGRESS NOTE ADULT - ASSESSMENT
87 year old man with history of BPH, HTN, T2DM presenting to the hospital with frequent falls found to have ANAM now s/p maldonado initially draining 1000cc of urine. Patient with endorsement of longstanding weakness in his lower extremities possibly contributing to his recent falls, though no obvious deficits on examination. Has evidence of urinary retention based on bladder scan and resultant drainage after maldonado catheter. Could represent product of his BPH versus spinal cord involvement though seems less likely iso his subjective lower extremity weakness being longstanding. 87 year old man with history of BPH, HTN, T2DM presenting to the hospital with frequent falls found to have ANAM likely due obstructive nephropathy. Patient with endorsement of longstanding weakness in his lower extremities likely due to polypharmacy.

## 2023-02-27 NOTE — PROVIDER CONTACT NOTE (MEDICATION) - ASSESSMENT
Patient A&Ox4, denies s/s of pain, SOB or dizziness.  VSS, request in a sleep aide. States he takes Melatonin at home.

## 2023-02-27 NOTE — PROGRESS NOTE ADULT - PROBLEM SELECTOR PLAN 3
- Calcium of 12.5 on admission; possibly indicative of advancing CKD  - F/U PTH, ionized calcium, 1,25 vitamin D - Calcium of 12.5 on admission; possibly indicative of advancing CKD  - Ionized Ca: 1.66  - PTH: 44  - VitD: 19

## 2023-02-27 NOTE — PROVIDER CONTACT NOTE (MEDICATION) - ACTION/TREATMENT ORDERED:
Dr. Johnathan Zamora, Team 3 notified and aware patient requesting Melatonin as sleep aide.  Melatonin ordered, will administer as per ordered.

## 2023-02-28 ENCOUNTER — TRANSCRIPTION ENCOUNTER (OUTPATIENT)
Age: 88
End: 2023-02-28

## 2023-02-28 LAB
ALBUMIN SERPL ELPH-MCNC: 3.6 G/DL — SIGNIFICANT CHANGE UP (ref 3.3–5)
ALP SERPL-CCNC: 65 U/L — SIGNIFICANT CHANGE UP (ref 40–120)
ALT FLD-CCNC: 29 U/L — SIGNIFICANT CHANGE UP (ref 10–45)
ANION GAP SERPL CALC-SCNC: 10 MMOL/L — SIGNIFICANT CHANGE UP (ref 5–17)
AST SERPL-CCNC: 31 U/L — SIGNIFICANT CHANGE UP (ref 10–40)
BILIRUB SERPL-MCNC: 0.5 MG/DL — SIGNIFICANT CHANGE UP (ref 0.2–1.2)
BUN SERPL-MCNC: 39 MG/DL — HIGH (ref 7–23)
CALCIUM SERPL-MCNC: 10.4 MG/DL — SIGNIFICANT CHANGE UP (ref 8.4–10.5)
CHLORIDE SERPL-SCNC: 106 MMOL/L — SIGNIFICANT CHANGE UP (ref 96–108)
CO2 SERPL-SCNC: 23 MMOL/L — SIGNIFICANT CHANGE UP (ref 22–31)
CREAT SERPL-MCNC: 2.36 MG/DL — HIGH (ref 0.5–1.3)
EGFR: 26 ML/MIN/1.73M2 — LOW
GLUCOSE BLDC GLUCOMTR-MCNC: 110 MG/DL — HIGH (ref 70–99)
GLUCOSE BLDC GLUCOMTR-MCNC: 118 MG/DL — HIGH (ref 70–99)
GLUCOSE BLDC GLUCOMTR-MCNC: 87 MG/DL — SIGNIFICANT CHANGE UP (ref 70–99)
GLUCOSE SERPL-MCNC: 104 MG/DL — HIGH (ref 70–99)
HCT VFR BLD CALC: 46.9 % — SIGNIFICANT CHANGE UP (ref 39–50)
HGB BLD-MCNC: 15.6 G/DL — SIGNIFICANT CHANGE UP (ref 13–17)
MAGNESIUM SERPL-MCNC: 1.6 MG/DL — SIGNIFICANT CHANGE UP (ref 1.6–2.6)
MCHC RBC-ENTMCNC: 33.3 GM/DL — SIGNIFICANT CHANGE UP (ref 32–36)
MCHC RBC-ENTMCNC: 34.6 PG — HIGH (ref 27–34)
MCV RBC AUTO: 104 FL — HIGH (ref 80–100)
NRBC # BLD: 0 /100 WBCS — SIGNIFICANT CHANGE UP (ref 0–0)
PHOSPHATE SERPL-MCNC: 2.2 MG/DL — LOW (ref 2.5–4.5)
PLATELET # BLD AUTO: 273 K/UL — SIGNIFICANT CHANGE UP (ref 150–400)
POTASSIUM SERPL-MCNC: 3.6 MMOL/L — SIGNIFICANT CHANGE UP (ref 3.5–5.3)
POTASSIUM SERPL-SCNC: 3.6 MMOL/L — SIGNIFICANT CHANGE UP (ref 3.5–5.3)
PROT SERPL-MCNC: 5.7 G/DL — LOW (ref 6–8.3)
RBC # BLD: 4.51 M/UL — SIGNIFICANT CHANGE UP (ref 4.2–5.8)
RBC # FLD: 14.2 % — SIGNIFICANT CHANGE UP (ref 10.3–14.5)
SODIUM SERPL-SCNC: 139 MMOL/L — SIGNIFICANT CHANGE UP (ref 135–145)
WBC # BLD: 9.64 K/UL — SIGNIFICANT CHANGE UP (ref 3.8–10.5)
WBC # FLD AUTO: 9.64 K/UL — SIGNIFICANT CHANGE UP (ref 3.8–10.5)

## 2023-02-28 PROCEDURE — 99232 SBSQ HOSP IP/OBS MODERATE 35: CPT | Mod: GC

## 2023-02-28 RX ORDER — SODIUM,POTASSIUM PHOSPHATES 278-250MG
1 POWDER IN PACKET (EA) ORAL ONCE
Refills: 0 | Status: COMPLETED | OUTPATIENT
Start: 2023-02-28 | End: 2023-02-28

## 2023-02-28 RX ORDER — OLANZAPINE 15 MG/1
2.5 TABLET, FILM COATED ORAL ONCE
Refills: 0 | Status: COMPLETED | OUTPATIENT
Start: 2023-02-28 | End: 2023-02-28

## 2023-02-28 RX ORDER — MAGNESIUM SULFATE 500 MG/ML
1 VIAL (ML) INJECTION ONCE
Refills: 0 | Status: COMPLETED | OUTPATIENT
Start: 2023-02-28 | End: 2023-02-28

## 2023-02-28 RX ORDER — OLANZAPINE 15 MG/1
2.5 TABLET, FILM COATED ORAL
Refills: 0 | Status: DISCONTINUED | OUTPATIENT
Start: 2023-02-28 | End: 2023-03-01

## 2023-02-28 RX ADMIN — Medication 100 GRAM(S): at 09:39

## 2023-02-28 RX ADMIN — Medication 1 PACKET(S): at 08:40

## 2023-02-28 RX ADMIN — HEPARIN SODIUM 5000 UNIT(S): 5000 INJECTION INTRAVENOUS; SUBCUTANEOUS at 15:28

## 2023-02-28 RX ADMIN — OLANZAPINE 2.5 MILLIGRAM(S): 15 TABLET, FILM COATED ORAL at 03:39

## 2023-02-28 RX ADMIN — AMLODIPINE BESYLATE 10 MILLIGRAM(S): 2.5 TABLET ORAL at 05:33

## 2023-02-28 RX ADMIN — HEPARIN SODIUM 5000 UNIT(S): 5000 INJECTION INTRAVENOUS; SUBCUTANEOUS at 05:33

## 2023-02-28 NOTE — PROGRESS NOTE ADULT - PROBLEM SELECTOR PLAN 4
Patient on home amlodipine 5 mg qd, and ramipril 2.5 qd  - Patient's SBP 150s  - Resume home amlodipine, dose increased to 10 qd

## 2023-02-28 NOTE — DISCHARGE NOTE PROVIDER - NSFOLLOWUPCLINICS_GEN_ALL_ED_FT
[facility]  Geriatrics  [address]  [place] [address]  Phone: [phone]  Fax:      [facility]  Geriatrics  [address]  [place] [address]  Phone: [phone]  Fax:     [facility]  Nephrology  [address] [place] [address]  Phone: [phone]  Fax:

## 2023-02-28 NOTE — PROGRESS NOTE ADULT - TIME BILLING
Review of tests, imaging, labs, documents, medical management, coordination of care and counseling.
Review of tests, imaging, labs, documents, medical management, coordination of care and counseling.

## 2023-02-28 NOTE — PROGRESS NOTE ADULT - PROBLEM SELECTOR PLAN 6
DVT PPx: heparin subq  Diet: DASH/TLC  Code: FULL  Dispo: pending clinical improvement  Communication: discussed with patient's son at bedside 2/27    Discharge information:  Pharmacy: [facility] in [place] per patient  PCP: Rufino Dobbins, patient willing to transition care to [facility]

## 2023-02-28 NOTE — DISCHARGE NOTE PROVIDER - CARE PROVIDER_API CALL
EMILY PAVON  Urology  [address] [place] [address]  Phone: [phone]  Fax: ([phone]  Established Patient  Follow Up Time: 2 weeks

## 2023-02-28 NOTE — DISCHARGE NOTE PROVIDER - NSDCCPCAREPLAN_GEN_ALL_CORE_FT
PRINCIPAL DISCHARGE DIAGNOSIS  Diagnosis: Recurrent falls  Assessment and Plan of Treatment: You came to the hospital because you had multiple falls over the past few days. It was determined that your falls are likely because of polypharmacy, meaning that you were unfortunately prescribed several unnecessary medications. Several medications may interact and cause generalized weakness, as well as other adverse effects. As such, it is VERY important that you ONLY take the medications that we are prescribing upon discharge until you see [facility].         SECONDARY DISCHARGE DIAGNOSES  Diagnosis: ANAM (acute kidney injury)  Assessment and Plan of Treatment: You were found to have an acute kidney injury which was from urinary retention. You were likely retaining more than usual urine because you were constipated. Please take finasteride and flomax as prescribed for urinary retention. Please take senna and miralax as prescribed if you are constipated. Please follow up with your established urologist Dr. Davey for further management of your urinary retention.     PRINCIPAL DISCHARGE DIAGNOSIS  Diagnosis: Recurrent falls  Assessment and Plan of Treatment: You came to the hospital because you had multiple falls over the past few days. It was determined that your falls are likely because of polypharmacy, meaning that you were unfortunately prescribed several unnecessary medications. Several medications may interact and cause generalized weakness, as well as other adverse effects. As such, it is VERY important that you ONLY take the medications that we are prescribing upon discharge until you see your new PCP at [facility].         SECONDARY DISCHARGE DIAGNOSES  Diagnosis: ANAM (acute kidney injury)  Assessment and Plan of Treatment: You were found to have an acute kidney injury which was from urinary retention. You were likely retaining more than usual urine because you were constipated. Please take finasteride and flomax as prescribed for urinary retention. Please take senna and miralax as prescribed if you are constipated. Please follow up with your established urologist Dr. Davey for further management of your urinary retention. Additionally, your Ramipril is being held due to the acute kidney injury, if your kidney function improves on discharge it can be restarted by your PCP.

## 2023-02-28 NOTE — PROGRESS NOTE ADULT - ASSESSMENT
87 year old man with history of BPH, HTN, T2DM presenting to the hospital with frequent falls found to have ANAM likely due obstructive nephropathy. Patient with endorsement of longstanding weakness in his lower extremities likely due to polypharmacy.

## 2023-02-28 NOTE — DISCHARGE NOTE PROVIDER - NSDCFUADDAPPT_GEN_ALL_CORE_FT
Please follow up with [facility]. If they do not give you or your son a call within the next 24 hours, call the number listed above and tell them that Dr. May called for an appointment for me. They should be able to give you an appointment within 1-2 weeks of discharge. If the above number does not work, please try     Please follow up with your urologist Dr. Davey as you were found to be retaining a large amount of urine this admission.    Please call the nephrology clinic at [phone] to schedule an appointment with a new nephrologist. Please follow up with [facility]. If they do not give you or your son a call within the next 24 hours, call the number listed above and tell them that Dr. May called for an appointment for me. They should be able to give you an appointment within 1-2 weeks of discharge.    Please follow up with your urologist Dr. Davey as you were found to be retaining a large amount of urine this admission.    Please call the nephrology clinic at [phone] to schedule an appointment with a new nephrologist.

## 2023-02-28 NOTE — PROGRESS NOTE ADULT - PROBLEM SELECTOR PLAN 3
- Calcium of 12.5 on admission; possibly indicative of advancing CKD  - Ionized Ca: 1.66  - PTH: 44  - VitD: 19

## 2023-02-28 NOTE — PROGRESS NOTE ADULT - PROBLEM SELECTOR PLAN 5
Patient reports well controlled T2DM, reports A1C of 5.2  - Patient is on dapagiflozin and pioglitazone at home despite well controlled T2DM  - Fingersticks pre meal and at bedtime  - Assess for need for sliding scale insulin once patient is eating  - F/U repeat A1C Patient reports well controlled T2DM, reports A1C of 5.2  - Patient is on dapagiflozin and pioglitazone at home despite well controlled T2DM  - Fingersticks pre meal and at bedtime  - Assess for need for sliding scale insulin once patient is eating  - A1C 5.5

## 2023-02-28 NOTE — PROGRESS NOTE ADULT - PROBLEM SELECTOR PLAN 1
- Endorsed by patient to have been three falls in the last two days with most recent being a trip and fall while at home  - He endorsed a longstanding weakness in his legs that he states was never able to be explained. He denied numbness, tingling sensation or pain in the legs. On exam strength equal in the lower extremities  - Patient has good recollection of events, all seem to be mechanical in nature or 2/2 to lower extremity weakness; no LOC or seizure like activity  - Likely due to polypharmacy. Patient on multiple medications that may be contributory; acebutolol, actos, farxiga, livalo, ramipril, amlodipine, tamsulosin, finasteride. Of note, many of these medications are not standard of care, and some have been recalled. Will encourage patine to follow with a new PCP and will refer to an in-house geriatrician  - Orthostatics negative  - Fall precautions  - PT recommending home with walker

## 2023-02-28 NOTE — DISCHARGE NOTE PROVIDER - HOSPITAL COURSE
87 year old man with history of BPH, HTN, T2DM presenting to the hospital with frequent falls. Per patient he has fallen 3 times over the past 2 days. Patient endorses that the most recent today was a trip and fall and that the one prior was from bending over to pick things up off the floor. Patient endorses that he has had weakness in his legs for "as long as he can remember", but that no one was able to find out why he has it. He denied loss of consciousness. States he uses a cane to ambulate at baseline and endorses that he would be getting new one with the four prongs to help him more. Endorses he doesn't feel like his balance is worse than usual. Denied chest pain, headaches, shortness of breath, abdominal pain, nausea or vomiting. Endorsed his appetite has never been great and that he isn't a big eater at baseline. Endorsed good intake of fluids. States he has one kidney that doesn't function properly at baseline. He stated he has still been having bowel movements, and said he couldn't say whether he had a change in frequency of urination.     Attempted to contact family members (son and daughter) for collateral at listed number without success    In the ED, patient hemodynamically stable and afebrile. Found to have leukocytosis to 14, and elevated BUN/Cr of 56/3.40 with no prior records for baseline comparison. UA significant for glucose and protein, with moderate blood on dip, but only 2 RBCs/hpf. Bladder scan showed 600cc urine, had maldonado placed, drained 1000cc. Had CT head/C-spine done which demonstrated no acute bleed, mass effect or midline shift, no acute fractures or traumatic subluxation, and chronic degenerative changes of the c-spine. CT A/P demonstrated acute vs subacute right 8th rib fracture and and possible portal venus gas. Given 500cc NS bolus.    Hospital Course:  A maldonado catheter was placed after the patient was found to be retaining approx 1L of urine, with good drainage. The patient continued to have adequate drainage and his creatinine improved significanntly. The patient also underwent an ultrasound which was remarkable for bilateral cysts and for chronic medical renal disease. The patient's falls were determined to be secondary to weakness which is likely due to polypharmacy. Upon reviewing the patient's home medications, it was determined that he is on several medications unnecessarily, some of which are inappropriate for his age and some of which have been recalled nationwide. The patient and his family were made aware of this, and were amenable to following up with one of the [facility] geriatricians for better primary care. See below for the admission and discharge medications.    Admission:  acebutolol 200 mg BID  pioglitazone 15 mg QD  allopurinol 300 mg QD  amlodipine 5 mg QD  coenzyme q10 200 mg QD  finasteride 5 mg QD  flomax 0.4 mg QD  librax 5mg-2.5mg BID  pitavastatin 2mg QD  pramipexole 0.25 mg QHS  ramipril 2.5 mg QD  montelukast 10 mg QD  ezetimibe 10 mg QD    Discharge:  flomax 0.4 mg QD  allopurinol 300 mg QD  amlodipine 10 mg QD  prampipexole 0.25 mg QHS  montelukast 10 mg QD       87 year old man with history of BPH, HTN, T2DM presenting to the hospital with frequent falls. Per patient he has fallen 3 times over the past 2 days. Patient endorses that the most recent today was a trip and fall and that the one prior was from bending over to pick things up off the floor. Patient endorses that he has had weakness in his legs for "as long as he can remember", but that no one was able to find out why he has it. He denied loss of consciousness. States he uses a cane to ambulate at baseline and endorses that he would be getting new one with the four prongs to help him more. Endorses he doesn't feel like his balance is worse than usual. Denied chest pain, headaches, shortness of breath, abdominal pain, nausea or vomiting. Endorsed his appetite has never been great and that he isn't a big eater at baseline. Endorsed good intake of fluids. States he has one kidney that doesn't function properly at baseline. He stated he has still been having bowel movements, and said he couldn't say whether he had a change in frequency of urination.     In the ED, patient hemodynamically stable and afebrile. Found to have leukocytosis to 14, and elevated BUN/Cr of 56/3.40 with no prior records for baseline comparison. UA significant for glucose and protein, with moderate blood on dip, but only 2 RBCs/hpf. Bladder scan showed 600cc urine, had maldonado placed, drained 1000cc. Had CT head/C-spine done which demonstrated no acute bleed, mass effect or midline shift, no acute fractures or traumatic subluxation, and chronic degenerative changes of the c-spine. CT A/P demonstrated acute vs subacute right 8th rib fracture and and possible portal venus gas. Given 500cc NS bolus.    Hospital Course:  A maldonado catheter was placed after the patient was found to be retaining approx 1L of urine, with good drainage. The patient continued to have adequate drainage and his creatinine improved significanntly. The patient also underwent an ultrasound which was remarkable for bilateral cysts and for chronic medical renal disease. The patient's falls were determined to be secondary to weakness which is likely due to polypharmacy. Upon reviewing the patient's home medications, it was determined that he is on several medications unnecessarily, some of which are inappropriate for his age and some of which have been recalled nationwide. The patient and his family were made aware of this, and were amenable to following up with one of the [facility] geriatricians for better primary care. See below for the admission and discharge medications.     Admission:  acebutolol 200 mg BID (unclear why prescribed)  pioglitazone 15 mg QD (unclear why prescribed, A1C is 5.5)  Farxiga (unclear why prescribed, A1C is 5.5)  allopurinol 300 mg QD   amlodipine 5 mg QD  coenzyme q10 200 mg QD  finasteride 5 mg QD  flomax 0.4 mg QD  librax 5mg-2.5mg BID (unclear why prescribed, benzodiazepine containing agent)  pitavastatin 2mg QD (unclear why prescribed, maybe statin myopathy previously? but this is not covered by his insurance)  pramipexole 0.25 mg QHS (for restless leg syndrome)  ramipril 2.5 mg QD  montelukast 10 mg QD  ezetimibe 10 mg QD    Discharge Medications:  flomax 0.4 mg QD  allopurinol 300 mg QD  amlodipine 5 mg QD  pramipexole 0.25 mg QHS  montelukast 10 mg QD  **ramipril 2.5 mg QD held given kidney function, can resume per PCP if kidney function improves**  **statin held given unclear lipid profile and age > 75, can resume per PCP pending repeat lipid profile**       87 year old man with history of BPH, HTN, T2DM presenting to the hospital with frequent falls. Per patient he has fallen 3 times over the past 2 days. Patient endorses that the most recent today was a trip and fall and that the one prior was from bending over to pick things up off the floor. Patient endorses that he has had weakness in his legs for "as long as he can remember", but that no one was able to find out why he has it. He denied loss of consciousness. States he uses a cane to ambulate at baseline and endorses that he would be getting new one with the four prongs to help him more. Endorses he doesn't feel like his balance is worse than usual. Denied chest pain, headaches, shortness of breath, abdominal pain, nausea or vomiting. Endorsed his appetite has never been great and that he isn't a big eater at baseline. Endorsed good intake of fluids. States he has one kidney that doesn't function properly at baseline. He stated he has still been having bowel movements, and said he couldn't say whether he had a change in frequency of urination.     In the ED, patient hemodynamically stable and afebrile. Found to have leukocytosis to 14, and elevated BUN/Cr of 56/3.40 with no prior records for baseline comparison. UA significant for glucose and protein, with moderate blood on dip, but only 2 RBCs/hpf. Bladder scan showed 600cc urine, had maldonado placed, drained 1000cc. Had CT head/C-spine done which demonstrated no acute bleed, mass effect or midline shift, no acute fractures or traumatic subluxation, and chronic degenerative changes of the c-spine. CT A/P demonstrated acute vs subacute right 8th rib fracture and and possible portal venus gas. Given 500cc NS bolus.    Hospital Course:  A maldonado catheter was placed after the patient was found to be retaining approx 1L of urine, with good drainage. The patient continued to have adequate drainage and his creatinine improved significanntly. The patient also underwent an ultrasound which was remarkable for bilateral cysts and for chronic medical renal disease. The patient's falls were determined to be secondary to weakness which is likely due to polypharmacy. Upon reviewing the patient's home medications, it was determined that he is on several medications unnecessarily, some of which are inappropriate for his age and some of which have been recalled nationwide. The patient and his family were made aware of this, and were amenable to following up with one of the [facility] geriatricians for better primary care. See below for the admission and discharge medications.     Admission:  acebutolol 200 mg BID (unclear why prescribed)  pioglitazone 15 mg QD (unclear why prescribed, A1C is 5.5)  Farxiga (unclear why prescribed, A1C is 5.5)  allopurinol 300 mg QD  amlodipine 5 mg QD  coenzyme q10 200 mg QD  finasteride 5 mg QD  flomax 0.4 mg QD  librax 5mg-2.5mg BID (unclear why prescribed, benzodiazepine containing agent)  pitavastatin 2mg QD (unclear why prescribed, maybe statin myopathy previously? but this is not covered by his insurance)  pramipexole 0.25 mg QHS (for restless leg syndrome)  ramipril 2.5 mg QD  montelukast 10 mg QD  ezetimibe 10 mg QD    Discharge Medications:  flomax 0.4 mg QD  allopurinol 300 mg QD  amlodipine 5 mg QD  pramipexole 0.25 mg QHS  montelukast 10 mg QD  Senna 2 tabs at bedtime  Miralax 17.5 BID  **ramipril 2.5 mg QD held given kidney function, can resume per PCP if kidney function improves**  **statin held given unclear lipid profile and age > 75, can resume per PCP pending repeat lipid profile** 87 year old man with history of BPH, HTN, T2DM presenting to the hospital with frequent falls. Per patient he has fallen 3 times over the past 2 days. Patient endorses that the most recent today was a trip and fall and that the one prior was from bending over to pick things up off the floor. Patient endorses that he has had weakness in his legs for "as long as he can remember", but that no one was able to find out why he has it. He denied loss of consciousness. States he uses a cane to ambulate at baseline and endorses that he would be getting new one with the four prongs to help him more. Endorses he doesn't feel like his balance is worse than usual. Denied chest pain, headaches, shortness of breath, abdominal pain, nausea or vomiting. Endorsed his appetite has never been great and that he isn't a big eater at baseline. Endorsed good intake of fluids. States he has one kidney that doesn't function properly at baseline. He stated he has still been having bowel movements, and said he couldn't say whether he had a change in frequency of urination.     In the ED, patient hemodynamically stable and afebrile. Found to have leukocytosis to 14, and elevated BUN/Cr of 56/3.40 with no prior records for baseline comparison. UA significant for glucose and protein, with moderate blood on dip, but only 2 RBCs/hpf. Bladder scan showed 600cc urine, had maldonado placed, drained 1000cc. Had CT head/C-spine done which demonstrated no acute bleed, mass effect or midline shift, no acute fractures or traumatic subluxation, and chronic degenerative changes of the c-spine. CT A/P demonstrated acute vs subacute right 8th rib fracture and and possible portal venus gas. Given 500cc NS bolus.    Hospital Course:  A maldonado catheter was placed after the patient was found to be retaining approx 1L of urine, with good drainage. The patient continued to have adequate drainage and his creatinine improved significanntly. The patient also underwent an ultrasound which was remarkable for bilateral cysts and for chronic medical renal disease. The patient's falls were determined to be secondary to weakness which is likely due to polypharmacy. Upon reviewing the patient's home medications, it was determined that he is on several medications unnecessarily, some of which are inappropriate for his age and some of which have been recalled nationwide. The patient and his family were made aware of this, and were amenable to following up with one of the [facility] geriatricians for better primary care. See below for the admission and discharge medications.     Admission:  acebutolol 200 mg BID (unclear why prescribed)  pioglitazone 15 mg QD (unclear why prescribed, A1C is 5.5)  Farxiga (unclear why prescribed, A1C is 5.5)  allopurinol 300 mg QD  amlodipine 5 mg QD  coenzyme q10 200 mg QD  finasteride 5 mg QD  flomax 0.4 mg QD  librax 5mg-2.5mg BID (unclear why prescribed, benzodiazepine containing agent)  pitavastatin 2mg QD (unclear why prescribed, maybe statin myopathy previously? but this is not covered by his insurance)  pramipexole 0.25 mg QHS (for restless leg syndrome)  ramipril 2.5 mg QD  montelukast 10 mg QD  ezetimibe 10 mg QD    Discharge Medications:  flomax 0.4 mg QD  allopurinol 300 mg QD  amlodipine 5 mg QD  pramipexole 0.25 mg QHS  montelukast 10 mg QD  Senna 2 tabs at bedtime  Miralax 17.5 BID  **ramipril 2.5 mg QD held given kidney function, can resume per PCP if kidney function improves**  **statin held given unclear lipid profile and age > 75, can resume per PCP pending repeat lipid profile**    Discharge Diagnoses  #Frequent falls  #Acute kidney injury  #Hypercalcemia  #Essential HTN  #Restless leg syndrome  #Benign prostatic hyperplasia

## 2023-02-28 NOTE — DISCHARGE NOTE PROVIDER - NSDCFUSCHEDAPPT_GEN_ALL_CORE_FT
Rukhsana Poon  [place] [facility]  GERIATRICS [address] [place] [address]   Scheduled Appointment: 03/14/2023

## 2023-02-28 NOTE — DISCHARGE NOTE PROVIDER - NSDCCPTREATMENT_GEN_ALL_CORE_FT
PRINCIPAL PROCEDURE  Procedure: CT head wo con  Findings and Treatment:   IMPRESSION:  CT Head:  No acuteintra-cranial hemorrhage, mass effect, or midline shift.  CT Cervical Spine:  No acute fracture or traumatic subluxation.  There are multi-level degenerative changes of the cervical spine.        SECONDARY PROCEDURE  Procedure: CT abdomen pelvis  Findings and Treatment: IMPRESSION: Linear foci of air in the liver worrisome for portal venous   gas.  Right eighth rib fracture appears acute to subacute.  The findings were discussed with Dr. EVER BRYAN on 2/25/2023 7:40 PM.      Procedure: Ultrasound of kidney and bladder  Findings and Treatment: ACC: [id number] EXAM:  US KIDNEYS AND BLADDER   ORDERED BY:  SKYE OSEGUERA   PROCEDURE DATE:  02/27/2023    INTERPRETATION:  CLINICAL INFORMATION: Renal insufficiency. Urinary   retention.  COMPARISON: CT abdomen/pelvis 2/25/2023.  TECHNIQUE: Sonography of the kidneys and bladder.  FINDINGS: Renal parenchymal thinning with lobulated morphology   identified. Increased echotexture of the renal parenchyma suggests   underlying chronic medical renal disease.  Right kidney: 12.0 cm.. No hydronephrosis or calculi. Multiple cysts   identified measuring up to 3 cm.  Left kidney: 10.8 cm.. No hydronephrosis or calculi. Multiple cysts   identified measuring up to 5.1 cm.  Urinary bladder: Not distended, likely related to indwellingFoley   catheter, precluding assessment.  IMPRESSION:  No evidence for bilateral hydronephrosis. Bilateral renal cysts. Renal   parenchymal thinning with lobulated morphology identified. Increased   echotexture of the renal parenchyma suggests underlying chronic medical   renal disease.

## 2023-02-28 NOTE — PROGRESS NOTE ADULT - SUBJECTIVE AND OBJECTIVE BOX
PROGRESS NOTE:     Patient is a 87y old  Male who presents with a chief complaint of Recurrent falls, ANAM (27 Feb 2023 07:31)      ---------------------------------------------------  Tonia May  PGY-1, Internal Medicine  Available on [employer] Teams  Pager: [phone] (SAM), or [phone] (NS)  ---------------------------------------------------    SUBJECTIVE / OVERNIGHT EVENTS:    No acute events overnight. Patient examined at bedside with no acute complaints.     Pain:  Bowel Movements:  Urination:  OOB:  PT:    REVIEW OF SYSTEMS:    CONSTITUTIONAL: No weakness, fevers or chills  EYES/ENT: No visual changes;  No vertigo or throat pain   NECK: No pain or stiffness  RESPIRATORY: No cough, wheezing, hemoptysis; No shortness of breath  CARDIOVASCULAR: No chest pain or palpitations  GASTROINTESTINAL: No abdominal or epigastric pain. No nausea, vomiting, or hematemesis; No diarrhea or constipation. No melena or hematochezia.  GENITOURINARY: No dysuria, frequency or hematuria  NEUROLOGICAL: No numbness or weakness  SKIN: No itching, rashes      MEDICATIONS  (STANDING):  amLODIPine   Tablet 10 milliGRAM(s) Oral daily  coronavirus bivalent (EUA) Booster Vaccine (PFIZER) 0.3 milliLiter(s) IntraMuscular once  dextrose 5% + lactated ringers. 1000 milliLiter(s) (75 mL/Hr) IV Continuous <Continuous>  dextrose 5%. 1000 milliLiter(s) (50 mL/Hr) IV Continuous <Continuous>  dextrose 5%. 1000 milliLiter(s) (100 mL/Hr) IV Continuous <Continuous>  dextrose 5%. 1000 milliLiter(s) (100 mL/Hr) IV Continuous <Continuous>  dextrose 5%. 1000 milliLiter(s) (50 mL/Hr) IV Continuous <Continuous>  dextrose 50% Injectable 25 Gram(s) IV Push once  dextrose 50% Injectable 12.5 Gram(s) IV Push once  dextrose 50% Injectable 25 Gram(s) IV Push once  dextrose 50% Injectable 25 Gram(s) IV Push once  dextrose 50% Injectable 12.5 Gram(s) IV Push once  dextrose 50% Injectable 25 Gram(s) IV Push once  glucagon  Injectable 1 milliGRAM(s) IntraMuscular once  glucagon  Injectable 1 milliGRAM(s) IntraMuscular once  heparin   Injectable 5000 Unit(s) SubCutaneous every 8 hours  insulin lispro (ADMELOG) corrective regimen sliding scale   SubCutaneous three times a day before meals  insulin lispro (ADMELOG) corrective regimen sliding scale   SubCutaneous at bedtime  lactated ringers. 500 milliLiter(s) (70 mL/Hr) IV Continuous <Continuous>  polyethylene glycol 3350 17 Gram(s) Oral two times a day  senna 2 Tablet(s) Oral at bedtime  tamsulosin 0.4 milliGRAM(s) Oral at bedtime    MEDICATIONS  (PRN):  dextrose Oral Gel 15 Gram(s) Oral once PRN Blood Glucose LESS THAN 70 milliGRAM(s)/deciliter  dextrose Oral Gel 15 Gram(s) Oral once PRN Blood Glucose LESS THAN 70 milliGRAM(s)/deciliter  melatonin 3 milliGRAM(s) Oral at bedtime PRN Sleep      CAPILLARY BLOOD GLUCOSE      POCT Blood Glucose.: 119 mg/dL (27 Feb 2023 21:16)  POCT Blood Glucose.: 104 mg/dL (27 Feb 2023 16:57)  POCT Blood Glucose.: 118 mg/dL (27 Feb 2023 12:17)  POCT Blood Glucose.: 100 mg/dL (27 Feb 2023 08:19)    I&O's Summary    27 Feb 2023 07:01  -  28 Feb 2023 07:00  --------------------------------------------------------  IN: 0 mL / OUT: 1100 mL / NET: -1100 mL        VITALS:   T(C): 36.7 (02-28-23 @ 05:12), Max: 36.8 (02-27-23 @ 15:26)  HR: 80 (02-28-23 @ 05:12) (78 - 82)  BP: 146/79 (02-28-23 @ 05:12) (141/74 - 152/76)  RR: 16 (02-28-23 @ 05:12) (16 - 18)  SpO2: 94% (02-28-23 @ 05:12) (92% - 94%)    GENERAL: NAD, lying in bed comfortably  HEAD:  Atraumatic, normocephalic  EYES: EOMI, PERRLA, conjunctiva and sclera clear  ENT: Moist mucous membranes  NECK: Supple, no JVD  HEART: Regular rate and rhythm, no murmurs, rubs, or gallops  LUNGS: Unlabored respirations.  Clear to auscultation bilaterally, no crackles, wheezing, or rhonchi  ABDOMEN: Soft, nontender, nondistended, +BS  EXTREMITIES: 2+ peripheral pulses bilaterally. No clubbing, cyanosis, or edema  NERVOUS SYSTEM:  A&Ox3, no focal deficits   SKIN: No rashes or lesions    LABS:                        15.6   9.64  )-----------( 273      ( 28 Feb 2023 06:43 )             46.9     02-27    140  |  106  |  45<H>  ----------------------------<  122<H>  3.9   |  21<L>  |  2.34<H>    Ca    10.7<H>      27 Feb 2023 12:12  Phos  2.1     02-27  Mg     1.8     02-27                Culture - Urine (collected 25 Feb 2023 17:53)  Source: Clean Catch Clean Catch (Midstream)  Final Report (26 Feb 2023 20:57):    No growth        RADIOLOGY & ADDITIONAL TESTS:  Results Reviewed:   Imaging Personally Reviewed:  Electrocardiogram Personally Reviewed:    COORDINATION OF CARE:  Care Discussed with Consultants/Other Providers [Y/N]:  Prior or Outpatient Records Reviewed [Y/N]:     PROGRESS NOTE:     Patient is a 87y old  Male who presents with a chief complaint of Recurrent falls, ANAM (27 Feb 2023 07:31)      ---------------------------------------------------  Tonia May  PGY-1, Internal Medicine  Available on [employer] Teams  Pager: [phone] ([facility]), or [phone] (NS)  ---------------------------------------------------    SUBJECTIVE / OVERNIGHT EVENTS:    Overnight patient agitated and restless, fixating on "there's poop under my bed". Patient repeatedly climbing out of bed to check under his bed, despite reassurance from RN and provider. Patient given 2.5 mg zyprexa x 1 out of concern for patient's safety given fall risk. Patient examined at bedside with no acute complaints.       MEDICATIONS  (STANDING):  amLODIPine   Tablet 10 milliGRAM(s) Oral daily  coronavirus bivalent (EUA) Booster Vaccine (PFIZER) 0.3 milliLiter(s) IntraMuscular once  dextrose 5% + lactated ringers. 1000 milliLiter(s) (75 mL/Hr) IV Continuous <Continuous>  dextrose 5%. 1000 milliLiter(s) (50 mL/Hr) IV Continuous <Continuous>  dextrose 5%. 1000 milliLiter(s) (100 mL/Hr) IV Continuous <Continuous>  dextrose 5%. 1000 milliLiter(s) (100 mL/Hr) IV Continuous <Continuous>  dextrose 5%. 1000 milliLiter(s) (50 mL/Hr) IV Continuous <Continuous>  dextrose 50% Injectable 25 Gram(s) IV Push once  dextrose 50% Injectable 12.5 Gram(s) IV Push once  dextrose 50% Injectable 25 Gram(s) IV Push once  dextrose 50% Injectable 25 Gram(s) IV Push once  dextrose 50% Injectable 12.5 Gram(s) IV Push once  dextrose 50% Injectable 25 Gram(s) IV Push once  glucagon  Injectable 1 milliGRAM(s) IntraMuscular once  glucagon  Injectable 1 milliGRAM(s) IntraMuscular once  heparin   Injectable 5000 Unit(s) SubCutaneous every 8 hours  insulin lispro (ADMELOG) corrective regimen sliding scale   SubCutaneous three times a day before meals  insulin lispro (ADMELOG) corrective regimen sliding scale   SubCutaneous at bedtime  lactated ringers. 500 milliLiter(s) (70 mL/Hr) IV Continuous <Continuous>  polyethylene glycol 3350 17 Gram(s) Oral two times a day  senna 2 Tablet(s) Oral at bedtime  tamsulosin 0.4 milliGRAM(s) Oral at bedtime    MEDICATIONS  (PRN):  dextrose Oral Gel 15 Gram(s) Oral once PRN Blood Glucose LESS THAN 70 milliGRAM(s)/deciliter  dextrose Oral Gel 15 Gram(s) Oral once PRN Blood Glucose LESS THAN 70 milliGRAM(s)/deciliter  melatonin 3 milliGRAM(s) Oral at bedtime PRN Sleep      CAPILLARY BLOOD GLUCOSE      POCT Blood Glucose.: 119 mg/dL (27 Feb 2023 21:16)  POCT Blood Glucose.: 104 mg/dL (27 Feb 2023 16:57)  POCT Blood Glucose.: 118 mg/dL (27 Feb 2023 12:17)  POCT Blood Glucose.: 100 mg/dL (27 Feb 2023 08:19)    I&O's Summary    27 Feb 2023 07:01  -  28 Feb 2023 07:00  --------------------------------------------------------  IN: 0 mL / OUT: 1100 mL / NET: -1100 mL        VITALS:   T(C): 36.7 (02-28-23 @ 05:12), Max: 36.8 (02-27-23 @ 15:26)  HR: 80 (02-28-23 @ 05:12) (78 - 82)  BP: 146/79 (02-28-23 @ 05:12) (141/74 - 152/76)  RR: 16 (02-28-23 @ 05:12) (16 - 18)  SpO2: 94% (02-28-23 @ 05:12) (92% - 94%)    GENERAL: NAD, lying in bed comfortably  HEAD:  Atraumatic, normocephalic  EYES: EOMI, PERRLA, conjunctiva and sclera clear  ENT: Moist mucous membranes  NECK: Supple, no JVD  HEART: Regular rate and rhythm, no murmurs, rubs, or gallops  LUNGS: Unlabored respirations.  Clear to auscultation bilaterally, no crackles, wheezing, or rhonchi  ABDOMEN: Soft, nontender, nondistended, +BS  EXTREMITIES: 2+ peripheral pulses bilaterally. No clubbing, cyanosis, or edema  NERVOUS SYSTEM:  A&Ox3, no focal deficits   SKIN: No rashes or lesions      LABS:                        15.6   9.64  )-----------( 273      ( 28 Feb 2023 06:43 )             46.9     02-27    140  |  106  |  45<H>  ----------------------------<  122<H>  3.9   |  21<L>  |  2.34<H>    Ca    10.7<H>      27 Feb 2023 12:12  Phos  2.1     02-27  Mg     1.8     02-27      Culture - Urine (collected 25 Feb 2023 17:53)  Source: Clean Catch Clean Catch (Midstream)  Final Report (26 Feb 2023 20:57):    No growth      RADIOLOGY & ADDITIONAL TESTS:    < from: US Kidney and Bladder (02.27.23 @ 16:53) >    IMPRESSION:  No evidence for bilateral hydronephrosis. Bilateral renal cysts. Renal   parenchymal thinning with lobulated morphology identified. Increased   echotexture of the renal parenchyma suggests underlying chronic medical   renal disease.    < end of copied text >

## 2023-02-28 NOTE — DISCHARGE NOTE PROVIDER - NSDCMRMEDTOKEN_GEN_ALL_CORE_FT
acebutolol 200 mg oral capsule: 1 cap(s) orally 2 times a day  Actos 15 mg oral tablet: 1 tab(s) orally once a day  allopurinol 300 mg oral tablet: 1 tab(s) orally once a day  amLODIPine 5 mg oral tablet: 1 tab(s) orally once a day  CoQ10: 200 milligram(s) orally once a day  finasteride 5 mg oral tablet: 1 tab(s) orally once a day  Flomax 0.4 mg oral capsule: 1 cap(s) orally once a day  Librax 5 mg-2.5 mg oral capsule: 1 cap(s) orally 2 times a day  Livalo 2 mg oral tablet: 1 tab(s) orally once a day (at bedtime)  Mirapex 0.25 mg oral tablet: 1 tab(s) orally once a day (at bedtime)  ramipril 2.5 mg oral tablet: 1 tab(s) orally once a day  Singulair 10 mg oral tablet: 1 tab(s) orally once a day (in the evening)  Zetia 10 mg oral tablet: 1 tab(s) orally once a day   allopurinol 300 mg oral tablet: 1 tab(s) orally once a day  amLODIPine 5 mg oral tablet: 1 tab(s) orally once a day  finasteride 5 mg oral tablet: 1 tab(s) orally once a day  Flomax 0.4 mg oral capsule: 1 cap(s) orally once a day  Mirapex 0.25 mg oral tablet: 1 tab(s) orally once a day (at bedtime)  polyethylene glycol 3350 oral powder for reconstitution: 17 gram(s) orally 2 times a day  senna leaf extract oral tablet: 2 tab(s) orally once a day (at bedtime)  Singulair 10 mg oral tablet: 1 tab(s) orally once a day (in the evening)

## 2023-02-28 NOTE — PROGRESS NOTE ADULT - PROBLEM SELECTOR PLAN 2
- Cr on admission of 3.40, Cr of 1.58 in 2019. 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 Possibly medication related  - Monitor I&O, dose meds per eGFR, avoid nephrotoxic agents  - Monitor for signs of post-obstructive diuresis. Per nursing, since draining 1000cc patient has put out 280cc  - TOV when appropriate  - Renal ultrasound consistent with chronic medical renal disease - Cr on admission of 3.40, Cr of 1.58 in 2019. Patient has CKD history, and based on his endorsement of having only one functional kidney  - Per discussion with patient's PCP, Cr has been 1.76 - 2.35 over the past year  - Significant improvement in repeat Cr with adequate drainage via maldonado, likely post renal ANAM with resulting ATN  - FENa appears indicative of intrinsic cause. Possibly medication related  - Monitor I&O, dose meds per eGFR, avoid nephrotoxic agents  - Monitor for signs of post-obstructive diuresis. Per nursing, since draining 1000cc patient has put out 280cc  - TOV today 2/28  - Renal ultrasound consistent with chronic medical renal disease

## 2023-03-01 ENCOUNTER — FORM ENCOUNTER (OUTPATIENT)
Age: 88
End: 2023-03-01

## 2023-03-01 ENCOUNTER — TRANSCRIPTION ENCOUNTER (OUTPATIENT)
Age: 88
End: 2023-03-01

## 2023-03-01 VITALS
SYSTOLIC BLOOD PRESSURE: 138 MMHG | HEART RATE: 77 BPM | TEMPERATURE: 98 F | RESPIRATION RATE: 16 BRPM | OXYGEN SATURATION: 93 % | DIASTOLIC BLOOD PRESSURE: 75 MMHG

## 2023-03-01 LAB
ANION GAP SERPL CALC-SCNC: 15 MMOL/L — SIGNIFICANT CHANGE UP (ref 5–17)
BUN SERPL-MCNC: 39 MG/DL — HIGH (ref 7–23)
CALCIUM SERPL-MCNC: 10.3 MG/DL — SIGNIFICANT CHANGE UP (ref 8.4–10.5)
CHLORIDE SERPL-SCNC: 107 MMOL/L — SIGNIFICANT CHANGE UP (ref 96–108)
CO2 SERPL-SCNC: 19 MMOL/L — LOW (ref 22–31)
CREAT SERPL-MCNC: 2.42 MG/DL — HIGH (ref 0.5–1.3)
EGFR: 26 ML/MIN/1.73M2 — LOW
GLUCOSE BLDC GLUCOMTR-MCNC: 133 MG/DL — HIGH (ref 70–99)
GLUCOSE BLDC GLUCOMTR-MCNC: 155 MG/DL — HIGH (ref 70–99)
GLUCOSE SERPL-MCNC: 98 MG/DL — SIGNIFICANT CHANGE UP (ref 70–99)
HCT VFR BLD CALC: 49.8 % — SIGNIFICANT CHANGE UP (ref 39–50)
HGB BLD-MCNC: 16.3 G/DL — SIGNIFICANT CHANGE UP (ref 13–17)
MAGNESIUM SERPL-MCNC: 2 MG/DL — SIGNIFICANT CHANGE UP (ref 1.6–2.6)
MCHC RBC-ENTMCNC: 32.7 GM/DL — SIGNIFICANT CHANGE UP (ref 32–36)
MCHC RBC-ENTMCNC: 34.2 PG — HIGH (ref 27–34)
MCV RBC AUTO: 104.6 FL — HIGH (ref 80–100)
NRBC # BLD: 0 /100 WBCS — SIGNIFICANT CHANGE UP (ref 0–0)
PHOSPHATE SERPL-MCNC: 2.4 MG/DL — LOW (ref 2.5–4.5)
PLATELET # BLD AUTO: 295 K/UL — SIGNIFICANT CHANGE UP (ref 150–400)
POTASSIUM SERPL-MCNC: 3.6 MMOL/L — SIGNIFICANT CHANGE UP (ref 3.5–5.3)
POTASSIUM SERPL-SCNC: 3.6 MMOL/L — SIGNIFICANT CHANGE UP (ref 3.5–5.3)
RBC # BLD: 4.76 M/UL — SIGNIFICANT CHANGE UP (ref 4.2–5.8)
RBC # FLD: 14.1 % — SIGNIFICANT CHANGE UP (ref 10.3–14.5)
SODIUM SERPL-SCNC: 141 MMOL/L — SIGNIFICANT CHANGE UP (ref 135–145)
WBC # BLD: 9.77 K/UL — SIGNIFICANT CHANGE UP (ref 3.8–10.5)
WBC # FLD AUTO: 9.77 K/UL — SIGNIFICANT CHANGE UP (ref 3.8–10.5)

## 2023-03-01 PROCEDURE — 85018 HEMOGLOBIN: CPT

## 2023-03-01 PROCEDURE — 85025 COMPLETE CBC W/AUTO DIFF WBC: CPT

## 2023-03-01 PROCEDURE — 82746 ASSAY OF FOLIC ACID SERUM: CPT

## 2023-03-01 PROCEDURE — 76770 US EXAM ABDO BACK WALL COMP: CPT

## 2023-03-01 PROCEDURE — 84540 ASSAY OF URINE/UREA-N: CPT

## 2023-03-01 PROCEDURE — 84132 ASSAY OF SERUM POTASSIUM: CPT

## 2023-03-01 PROCEDURE — 83970 ASSAY OF PARATHORMONE: CPT

## 2023-03-01 PROCEDURE — 82962 GLUCOSE BLOOD TEST: CPT

## 2023-03-01 PROCEDURE — 83036 HEMOGLOBIN GLYCOSYLATED A1C: CPT

## 2023-03-01 PROCEDURE — 84300 ASSAY OF URINE SODIUM: CPT

## 2023-03-01 PROCEDURE — 80048 BASIC METABOLIC PNL TOTAL CA: CPT

## 2023-03-01 PROCEDURE — 87086 URINE CULTURE/COLONY COUNT: CPT

## 2023-03-01 PROCEDURE — 72125 CT NECK SPINE W/O DYE: CPT | Mod: MA

## 2023-03-01 PROCEDURE — 72170 X-RAY EXAM OF PELVIS: CPT

## 2023-03-01 PROCEDURE — 74176 CT ABD & PELVIS W/O CONTRAST: CPT | Mod: MA

## 2023-03-01 PROCEDURE — 87637 SARSCOV2&INF A&B&RSV AMP PRB: CPT

## 2023-03-01 PROCEDURE — 86901 BLOOD TYPING SEROLOGIC RH(D): CPT

## 2023-03-01 PROCEDURE — 82570 ASSAY OF URINE CREATININE: CPT

## 2023-03-01 PROCEDURE — 84100 ASSAY OF PHOSPHORUS: CPT

## 2023-03-01 PROCEDURE — 86850 RBC ANTIBODY SCREEN: CPT

## 2023-03-01 PROCEDURE — 82947 ASSAY GLUCOSE BLOOD QUANT: CPT

## 2023-03-01 PROCEDURE — 82607 VITAMIN B-12: CPT

## 2023-03-01 PROCEDURE — 84295 ASSAY OF SERUM SODIUM: CPT

## 2023-03-01 PROCEDURE — 82435 ASSAY OF BLOOD CHLORIDE: CPT

## 2023-03-01 PROCEDURE — 80053 COMPREHEN METABOLIC PANEL: CPT

## 2023-03-01 PROCEDURE — 83930 ASSAY OF BLOOD OSMOLALITY: CPT

## 2023-03-01 PROCEDURE — 82803 BLOOD GASES ANY COMBINATION: CPT

## 2023-03-01 PROCEDURE — 86900 BLOOD TYPING SEROLOGIC ABO: CPT

## 2023-03-01 PROCEDURE — 85014 HEMATOCRIT: CPT

## 2023-03-01 PROCEDURE — 99285 EMERGENCY DEPT VISIT HI MDM: CPT

## 2023-03-01 PROCEDURE — 83935 ASSAY OF URINE OSMOLALITY: CPT

## 2023-03-01 PROCEDURE — 81001 URINALYSIS AUTO W/SCOPE: CPT

## 2023-03-01 PROCEDURE — 83735 ASSAY OF MAGNESIUM: CPT

## 2023-03-01 PROCEDURE — 36415 COLL VENOUS BLD VENIPUNCTURE: CPT

## 2023-03-01 PROCEDURE — 85610 PROTHROMBIN TIME: CPT

## 2023-03-01 PROCEDURE — 82330 ASSAY OF CALCIUM: CPT

## 2023-03-01 PROCEDURE — 82652 VIT D 1 25-DIHYDROXY: CPT

## 2023-03-01 PROCEDURE — 70450 CT HEAD/BRAIN W/O DYE: CPT | Mod: MA

## 2023-03-01 PROCEDURE — 99239 HOSP IP/OBS DSCHRG MGMT >30: CPT

## 2023-03-01 PROCEDURE — 84443 ASSAY THYROID STIM HORMONE: CPT

## 2023-03-01 PROCEDURE — 71045 X-RAY EXAM CHEST 1 VIEW: CPT

## 2023-03-01 PROCEDURE — 84133 ASSAY OF URINE POTASSIUM: CPT

## 2023-03-01 PROCEDURE — 85730 THROMBOPLASTIN TIME PARTIAL: CPT

## 2023-03-01 PROCEDURE — 84156 ASSAY OF PROTEIN URINE: CPT

## 2023-03-01 PROCEDURE — 85027 COMPLETE CBC AUTOMATED: CPT

## 2023-03-01 PROCEDURE — 97161 PT EVAL LOW COMPLEX 20 MIN: CPT

## 2023-03-01 PROCEDURE — 82310 ASSAY OF CALCIUM: CPT

## 2023-03-01 PROCEDURE — 83605 ASSAY OF LACTIC ACID: CPT

## 2023-03-01 RX ORDER — CHLORDIAZEPOXIDE/CLIDINIUM BR 5 MG-2.5MG
1 CAPSULE ORAL
Qty: 0 | Refills: 0 | DISCHARGE

## 2023-03-01 RX ORDER — UBIDECARENONE 100 MG
200 CAPSULE ORAL
Qty: 0 | Refills: 0 | DISCHARGE

## 2023-03-01 RX ORDER — SENNA PLUS 8.6 MG/1
2 TABLET ORAL
Qty: 0 | Refills: 0 | DISCHARGE
Start: 2023-03-01

## 2023-03-01 RX ORDER — RAMIPRIL 5 MG
1 CAPSULE ORAL
Qty: 0 | Refills: 0 | DISCHARGE

## 2023-03-01 RX ORDER — PITAVASTATIN CALCIUM 1.04 MG/1
1 TABLET, FILM COATED ORAL
Qty: 0 | Refills: 0 | DISCHARGE

## 2023-03-01 RX ORDER — POLYETHYLENE GLYCOL 3350 17 G/17G
17 POWDER, FOR SOLUTION ORAL
Qty: 0 | Refills: 0 | DISCHARGE
Start: 2023-03-01

## 2023-03-01 RX ADMIN — AMLODIPINE BESYLATE 10 MILLIGRAM(S): 2.5 TABLET ORAL at 06:24

## 2023-03-01 RX ADMIN — Medication 1: at 12:41

## 2023-03-01 RX ADMIN — HEPARIN SODIUM 5000 UNIT(S): 5000 INJECTION INTRAVENOUS; SUBCUTANEOUS at 06:24

## 2023-03-01 NOTE — PROGRESS NOTE ADULT - PROBLEM SELECTOR PLAN 5
Patient reports well controlled T2DM, reports A1C of 5.2  - Patient is on dapagiflozin and pioglitazone at home despite well controlled T2DM  - Fingersticks pre meal and at bedtime  - Assess for need for sliding scale insulin once patient is eating  - A1C 5.5

## 2023-03-01 NOTE — PROGRESS NOTE ADULT - PROBLEM SELECTOR PLAN 2
- Cr on admission of 3.40, Cr of 1.58 in 2019. Patient has CKD history, and based on his endorsement of having only one functional kidney  - Per discussion with patient's PCP, Cr has been 1.76 - 2.35 over the past year  - Significant improvement in repeat Cr with adequate drainage via maldonado, likely post renal ANAM with resulting ATN  - FENa appears indicative of intrinsic cause. Possibly medication related  - Monitor I&O, dose meds per eGFR, avoid nephrotoxic agents  - Monitor for signs of post-obstructive diuresis. Per nursing, since draining 1000cc patient has put out 280cc  - TOV today 2/28  - Renal ultrasound consistent with chronic medical renal disease

## 2023-03-01 NOTE — PROGRESS NOTE ADULT - ATTENDING COMMENTS
87 year old male with PMH BPH, HTN and DM2 who initially presented to the hospital with frequent falls. His falls are likely due to polypharmacy as he is on numerous medications that are not standard of care and have been recalled. We have encouraged the patient to stop some of these medications upon discharge and will set him up with [facility] on discharge. He was seen by PT and they are recommending home PT. His course his been complicated by ANAM due to urinary obstruction. His initial creatinine was 3.40 and is down to 2.42 after Suarez catheter placement which is about his baseline per his PCP. Renal ultrasound was significant for medical renal disease. Patient passed TOV and is medically ready for discharge. Rest of plan as above. Total time spent discharge planning 41 minutes.
87 year old male with PMH BPH, HTN and DM2 who initially presented to the hospital with frequent falls. His falls are likely due to polypharmacy as he is on numerous medications that are not standard of care and have been recalled. We have encouraged the patient to stop some of these medications upon discharge. He was seen by PT and they are recommending home PT. His course his been complicated by ANAM due to obstruction. His initial creatinine was 3.40 and is down to 2.34 after Suarez catheter placement which is about his baseline per his PCP. Renal ultrasound was significant for medical renal disease. Will perform TOV today. Rest of plan as above.
87 year old male with PMH BPH, HTN and DM2 who initially presented to the hospital with frequent falls. His falls are likely due to polypharmacy as he is on numerous medications that are not standard of care and have been recalled. We have encouraged the patient to stop some of these medications upon discharge. He was seen by PT and they are recommending home PT. His course his been complicated by ANAM due to obstruction. His initial creatinine was 3.40 and is down to 2.34 after Suarez catheter placement. Renal ultrasound was significant for medical renal disease. He likely has ANAM on CKD, will follow up with his PCP to obtain baseline creatinine. Rest of plan as above.

## 2023-03-01 NOTE — DISCHARGE NOTE NURSING/CASE MANAGEMENT/SOCIAL WORK - NSDCFUADDAPPT_GEN_ALL_CORE_FT
Please follow up with [facility]. If they do not give you or your son a call within the next 24 hours, call the number listed above and tell them that Dr. May called for an appointment for me. They should be able to give you an appointment within 1-2 weeks of discharge. If the above number does not work, please try     Please follow up with your urologist Dr. Davey as you were found to be retaining a large amount of urine this admission.    Please call the nephrology clinic at [phone] to schedule an appointment with a new nephrologist.

## 2023-03-01 NOTE — PROGRESS NOTE ADULT - PROBLEM SELECTOR PLAN 3
- Calcium of 12.5 on admission; possibly indicative of advancing CKD  - Ionized Ca: 1.66  - PTH: 44  - VitD: 19 - Calcium of 12.5 on admission; possibly indicative of advancing CKD  - Ionized Ca: 1.66  - PTH: 44  - VitD: 19    Resolved

## 2023-03-01 NOTE — PROGRESS NOTE ADULT - PROBLEM SELECTOR PROBLEM 2
ANAM (acute kidney injury)

## 2023-03-01 NOTE — PROGRESS NOTE ADULT - PROBLEM SELECTOR PLAN 6
DVT PPx: heparin subq  Diet: DASH/TLC  Code: FULL  Dispo: pending clinical improvement  Communication: discussed with patient's son at bedside 2/27    Discharge information:  Pharmacy: [facility] in [place] per patient  PCP: Rufino Dobbins, patient willing to transition care to [facility] DVT PPx: heparin subq  Diet: DASH/TLC  Code: FULL  Dispo: pending clinical improvement  Communication: discussed with patient's son at bedside 2/27    Discharge information:  PCP: Rufino Dobbins, patient willing to transition care to [facility]

## 2023-03-01 NOTE — PROGRESS NOTE ADULT - SUBJECTIVE AND OBJECTIVE BOX
PROGRESS NOTE:     Patient is a 87y old  Male who presents with a chief complaint of ANAM (28 Feb 2023 13:16)      ---------------------------------------------------  Tonia May  PGY-1, Internal Medicine  Available on [employer] Teams  Pager: [phone] (LITORSTEN), or [phone] (NS)  ---------------------------------------------------    SUBJECTIVE / OVERNIGHT EVENTS:    No acute events overnight. Patient examined at bedside with no acute complaints.     Pain:  Bowel Movements:  Urination:  OOB:  PT:    REVIEW OF SYSTEMS:    CONSTITUTIONAL: No weakness, fevers or chills  EYES/ENT: No visual changes;  No vertigo or throat pain   NECK: No pain or stiffness  RESPIRATORY: No cough, wheezing, hemoptysis; No shortness of breath  CARDIOVASCULAR: No chest pain or palpitations  GASTROINTESTINAL: No abdominal or epigastric pain. No nausea, vomiting, or hematemesis; No diarrhea or constipation. No melena or hematochezia.  GENITOURINARY: No dysuria, frequency or hematuria  NEUROLOGICAL: No numbness or weakness  SKIN: No itching, rashes      MEDICATIONS  (STANDING):  amLODIPine   Tablet 10 milliGRAM(s) Oral daily  coronavirus bivalent (EUA) Booster Vaccine (PFIZER) 0.3 milliLiter(s) IntraMuscular once  dextrose 5% + lactated ringers. 1000 milliLiter(s) (75 mL/Hr) IV Continuous <Continuous>  dextrose 5%. 1000 milliLiter(s) (50 mL/Hr) IV Continuous <Continuous>  dextrose 5%. 1000 milliLiter(s) (100 mL/Hr) IV Continuous <Continuous>  dextrose 5%. 1000 milliLiter(s) (50 mL/Hr) IV Continuous <Continuous>  dextrose 5%. 1000 milliLiter(s) (100 mL/Hr) IV Continuous <Continuous>  dextrose 50% Injectable 25 Gram(s) IV Push once  dextrose 50% Injectable 12.5 Gram(s) IV Push once  dextrose 50% Injectable 25 Gram(s) IV Push once  dextrose 50% Injectable 25 Gram(s) IV Push once  dextrose 50% Injectable 12.5 Gram(s) IV Push once  dextrose 50% Injectable 25 Gram(s) IV Push once  glucagon  Injectable 1 milliGRAM(s) IntraMuscular once  glucagon  Injectable 1 milliGRAM(s) IntraMuscular once  heparin   Injectable 5000 Unit(s) SubCutaneous every 8 hours  insulin lispro (ADMELOG) corrective regimen sliding scale   SubCutaneous three times a day before meals  insulin lispro (ADMELOG) corrective regimen sliding scale   SubCutaneous at bedtime  lactated ringers. 500 milliLiter(s) (70 mL/Hr) IV Continuous <Continuous>  polyethylene glycol 3350 17 Gram(s) Oral two times a day  senna 2 Tablet(s) Oral at bedtime  tamsulosin 0.4 milliGRAM(s) Oral at bedtime    MEDICATIONS  (PRN):  dextrose Oral Gel 15 Gram(s) Oral once PRN Blood Glucose LESS THAN 70 milliGRAM(s)/deciliter  dextrose Oral Gel 15 Gram(s) Oral once PRN Blood Glucose LESS THAN 70 milliGRAM(s)/deciliter  melatonin 3 milliGRAM(s) Oral at bedtime PRN Sleep  OLANZapine 2.5 milliGRAM(s) Oral two times a day PRN agitation      CAPILLARY BLOOD GLUCOSE      POCT Blood Glucose.: 103 mg/dL (28 Feb 2023 21:34)  POCT Blood Glucose.: 118 mg/dL (28 Feb 2023 17:01)  POCT Blood Glucose.: 110 mg/dL (28 Feb 2023 12:37)  POCT Blood Glucose.: 87 mg/dL (28 Feb 2023 08:39)    I&O's Summary    28 Feb 2023 07:01  -  01 Mar 2023 07:00  --------------------------------------------------------  IN: 0 mL / OUT: 300 mL / NET: -300 mL        VITALS:   T(C): 36.4 (03-01-23 @ 00:00), Max: 36.8 (02-28-23 @ 15:25)  HR: 72 (03-01-23 @ 00:00) (72 - 76)  BP: 147/73 (03-01-23 @ 00:00) (139/71 - 147/73)  RR: 16 (03-01-23 @ 00:00) (16 - 16)  SpO2: 92% (03-01-23 @ 00:00) (92% - 92%)    GENERAL: NAD, lying in bed comfortably  HEAD:  Atraumatic, normocephalic  EYES: EOMI, PERRLA, conjunctiva and sclera clear  ENT: Moist mucous membranes  NECK: Supple, no JVD  HEART: Regular rate and rhythm, no murmurs, rubs, or gallops  LUNGS: Unlabored respirations.  Clear to auscultation bilaterally, no crackles, wheezing, or rhonchi  ABDOMEN: Soft, nontender, nondistended, +BS  EXTREMITIES: 2+ peripheral pulses bilaterally. No clubbing, cyanosis, or edema  NERVOUS SYSTEM:  A&Ox3, no focal deficits   SKIN: No rashes or lesions    LABS:                        15.6   9.64  )-----------( 273      ( 28 Feb 2023 06:43 )             46.9     02-28    139  |  106  |  39<H>  ----------------------------<  104<H>  3.6   |  23  |  2.36<H>    Ca    10.4      28 Feb 2023 06:43  Phos  2.2     02-28  Mg     1.6     02-28    TPro  5.7<L>  /  Alb  3.6  /  TBili  0.5  /  DBili  x   /  AST  31  /  ALT  29  /  AlkPhos  65  02-28                RADIOLOGY & ADDITIONAL TESTS:  Results Reviewed:   Imaging Personally Reviewed:  Electrocardiogram Personally Reviewed:    COORDINATION OF CARE:  Care Discussed with Consultants/Other Providers [Y/N]:  Prior or Outpatient Records Reviewed [Y/N]:     PROGRESS NOTE:     Patient is a 87y old  Male who presents with a chief complaint of ANAM (28 Feb 2023 13:16)      ---------------------------------------------------  Tonia May  PGY-1, Internal Medicine  Available on [employer] Teams  Pager: [phone] (SAM), or [phone] (NS)  ---------------------------------------------------    SUBJECTIVE / OVERNIGHT EVENTS:    No acute events overnight. Patient examined at bedside with no acute complaints. Patient urinating well, passed TOV.       MEDICATIONS  (STANDING):  amLODIPine   Tablet 10 milliGRAM(s) Oral daily  coronavirus bivalent (EUA) Booster Vaccine (PFIZER) 0.3 milliLiter(s) IntraMuscular once  dextrose 5% + lactated ringers. 1000 milliLiter(s) (75 mL/Hr) IV Continuous <Continuous>  dextrose 5%. 1000 milliLiter(s) (50 mL/Hr) IV Continuous <Continuous>  dextrose 5%. 1000 milliLiter(s) (100 mL/Hr) IV Continuous <Continuous>  dextrose 5%. 1000 milliLiter(s) (50 mL/Hr) IV Continuous <Continuous>  dextrose 5%. 1000 milliLiter(s) (100 mL/Hr) IV Continuous <Continuous>  dextrose 50% Injectable 25 Gram(s) IV Push once  dextrose 50% Injectable 12.5 Gram(s) IV Push once  dextrose 50% Injectable 25 Gram(s) IV Push once  dextrose 50% Injectable 25 Gram(s) IV Push once  dextrose 50% Injectable 12.5 Gram(s) IV Push once  dextrose 50% Injectable 25 Gram(s) IV Push once  glucagon  Injectable 1 milliGRAM(s) IntraMuscular once  glucagon  Injectable 1 milliGRAM(s) IntraMuscular once  heparin   Injectable 5000 Unit(s) SubCutaneous every 8 hours  insulin lispro (ADMELOG) corrective regimen sliding scale   SubCutaneous three times a day before meals  insulin lispro (ADMELOG) corrective regimen sliding scale   SubCutaneous at bedtime  lactated ringers. 500 milliLiter(s) (70 mL/Hr) IV Continuous <Continuous>  polyethylene glycol 3350 17 Gram(s) Oral two times a day  senna 2 Tablet(s) Oral at bedtime  tamsulosin 0.4 milliGRAM(s) Oral at bedtime    MEDICATIONS  (PRN):  dextrose Oral Gel 15 Gram(s) Oral once PRN Blood Glucose LESS THAN 70 milliGRAM(s)/deciliter  dextrose Oral Gel 15 Gram(s) Oral once PRN Blood Glucose LESS THAN 70 milliGRAM(s)/deciliter  melatonin 3 milliGRAM(s) Oral at bedtime PRN Sleep  OLANZapine 2.5 milliGRAM(s) Oral two times a day PRN agitation      CAPILLARY BLOOD GLUCOSE      POCT Blood Glucose.: 103 mg/dL (28 Feb 2023 21:34)  POCT Blood Glucose.: 118 mg/dL (28 Feb 2023 17:01)  POCT Blood Glucose.: 110 mg/dL (28 Feb 2023 12:37)  POCT Blood Glucose.: 87 mg/dL (28 Feb 2023 08:39)    I&O's Summary    28 Feb 2023 07:01  -  01 Mar 2023 07:00  --------------------------------------------------------  IN: 0 mL / OUT: 300 mL / NET: -300 mL        VITALS:   T(C): 36.4 (03-01-23 @ 00:00), Max: 36.8 (02-28-23 @ 15:25)  HR: 72 (03-01-23 @ 00:00) (72 - 76)  BP: 147/73 (03-01-23 @ 00:00) (139/71 - 147/73)  RR: 16 (03-01-23 @ 00:00) (16 - 16)  SpO2: 92% (03-01-23 @ 00:00) (92% - 92%)    GENERAL: NAD, lying in bed comfortably  HEAD:  Atraumatic, normocephalic  EYES: EOMI, PERRLA, conjunctiva and sclera clear  ENT: Moist mucous membranes  NECK: Supple, no JVD  HEART: Regular rate and rhythm, no murmurs, rubs, or gallops  LUNGS: Unlabored respirations.  Clear to auscultation bilaterally, no crackles, wheezing, or rhonchi  ABDOMEN: Soft, nontender, nondistended, +BS  EXTREMITIES: 2+ peripheral pulses bilaterally. No clubbing, cyanosis, or edema  NERVOUS SYSTEM:  A&Ox3, no focal deficits   SKIN: No rashes or lesions      LABS:                          16.3   9.77  )-----------( 295      ( 01 Mar 2023 07:05 )             49.8     03-01    141  |  107  |  39<H>  ----------------------------<  98  3.6   |  19<L>  |  2.42<H>    Ca    10.3      01 Mar 2023 06:59  Phos  2.4     03-01  Mg     2.0     03-01    TPro  5.7<L>  /  Alb  3.6  /  TBili  0.5  /  DBili  x   /  AST  31  /  ALT  29  /  AlkPhos  65  02-28

## 2023-03-01 NOTE — DISCHARGE NOTE NURSING/CASE MANAGEMENT/SOCIAL WORK - NSDCPEFALRISK_GEN_ALL_CORE
For information on Fall & Injury Prevention, visit: [email or web address] OR  [email or web address] OR  https://www.cdc.gov/steadi/patient.html

## 2023-03-02 ENCOUNTER — NON-APPOINTMENT (OUTPATIENT)
Age: 88
End: 2023-03-02

## 2023-03-14 ENCOUNTER — APPOINTMENT (OUTPATIENT)
Dept: GERIATRICS | Facility: CLINIC | Age: 88
End: 2023-03-14
Payer: MEDICARE

## 2023-03-14 VITALS
WEIGHT: 136.8 LBS | SYSTOLIC BLOOD PRESSURE: 167 MMHG | BODY MASS INDEX: 24.86 KG/M2 | DIASTOLIC BLOOD PRESSURE: 80 MMHG | HEIGHT: 62.4 IN | OXYGEN SATURATION: 97 % | RESPIRATION RATE: 16 BRPM | HEART RATE: 100 BPM

## 2023-03-14 VITALS — SYSTOLIC BLOOD PRESSURE: 150 MMHG | DIASTOLIC BLOOD PRESSURE: 70 MMHG

## 2023-03-14 DIAGNOSIS — K59.00 CONSTIPATION, UNSPECIFIED: ICD-10-CM

## 2023-03-14 DIAGNOSIS — Z86.79 PERSONAL HISTORY OF OTHER DISEASES OF THE CIRCULATORY SYSTEM: ICD-10-CM

## 2023-03-14 DIAGNOSIS — E78.00 PURE HYPERCHOLESTEROLEMIA, UNSPECIFIED: ICD-10-CM

## 2023-03-14 DIAGNOSIS — E11.9 TYPE 2 DIABETES MELLITUS W/OUT COMPLICATIONS: ICD-10-CM

## 2023-03-14 DIAGNOSIS — G25.81 RESTLESS LEGS SYNDROME: ICD-10-CM

## 2023-03-14 DIAGNOSIS — Z79.899 OTHER LONG TERM (CURRENT) DRUG THERAPY: ICD-10-CM

## 2023-03-14 DIAGNOSIS — N40.0 BENIGN PROSTATIC HYPERPLASIA WITHOUT LOWER URINARY TRACT SYMPMS: ICD-10-CM

## 2023-03-14 DIAGNOSIS — Z87.19 PERSONAL HISTORY OF OTHER DISEASES OF THE DIGESTIVE SYSTEM: ICD-10-CM

## 2023-03-14 DIAGNOSIS — Z87.438 PERSONAL HISTORY OF OTHER DISEASES OF MALE GENITAL ORGANS: ICD-10-CM

## 2023-03-14 DIAGNOSIS — Z87.898 PERSONAL HISTORY OF OTHER SPECIFIED CONDITIONS: ICD-10-CM

## 2023-03-14 DIAGNOSIS — J45.909 UNSPECIFIED ASTHMA, UNCOMPLICATED: ICD-10-CM

## 2023-03-14 DIAGNOSIS — Z23 ENCOUNTER FOR IMMUNIZATION: ICD-10-CM

## 2023-03-14 DIAGNOSIS — N18.9 CHRONIC KIDNEY DISEASE, UNSPECIFIED: ICD-10-CM

## 2023-03-14 DIAGNOSIS — M10.9 GOUT, UNSPECIFIED: ICD-10-CM

## 2023-03-14 PROCEDURE — 99205 OFFICE O/P NEW HI 60 MIN: CPT

## 2023-03-15 ENCOUNTER — NON-APPOINTMENT (OUTPATIENT)
Age: 88
End: 2023-03-15

## 2023-03-15 DIAGNOSIS — E87.5 HYPERKALEMIA: ICD-10-CM

## 2023-03-15 PROBLEM — N40.0 BPH (BENIGN PROSTATIC HYPERPLASIA): Status: ACTIVE | Noted: 2023-03-15

## 2023-03-15 PROBLEM — Z23 ENCOUNTER FOR IMMUNIZATION: Status: ACTIVE | Noted: 2023-03-14

## 2023-03-15 PROBLEM — G25.81 RLS (RESTLESS LEGS SYNDROME): Status: ACTIVE | Noted: 2023-03-15

## 2023-03-15 PROBLEM — M10.9 GOUT: Status: ACTIVE | Noted: 2023-03-15

## 2023-03-15 PROBLEM — J45.909 ASTHMA: Status: ACTIVE | Noted: 2023-03-15

## 2023-03-15 PROBLEM — K59.00 CONSTIPATION: Status: ACTIVE | Noted: 2023-03-15

## 2023-03-15 LAB
ALBUMIN SERPL ELPH-MCNC: 4.5 G/DL
ALP BLD-CCNC: 85 U/L
ALT SERPL-CCNC: 12 U/L
ANION GAP SERPL CALC-SCNC: 19 MMOL/L
AST SERPL-CCNC: 19 U/L
BASOPHILS # BLD AUTO: 0.12 K/UL
BASOPHILS NFR BLD AUTO: 0.9 %
BILIRUB SERPL-MCNC: 0.2 MG/DL
BUN SERPL-MCNC: 25 MG/DL
CALCIUM SERPL-MCNC: 9.2 MG/DL
CHLORIDE SERPL-SCNC: 107 MMOL/L
CHOLEST SERPL-MCNC: 166 MG/DL
CO2 SERPL-SCNC: 18 MMOL/L
CREAT SERPL-MCNC: 1.94 MG/DL
EGFR: 33 ML/MIN/1.73M2
EOSINOPHIL # BLD AUTO: 0.31 K/UL
EOSINOPHIL NFR BLD AUTO: 2.4 %
ESTIMATED AVERAGE GLUCOSE: 114 MG/DL
FOLATE SERPL-MCNC: >20 NG/ML
GLUCOSE SERPL-MCNC: 96 MG/DL
HBA1C MFR BLD HPLC: 5.6 %
HCT VFR BLD CALC: 50.6 %
HDLC SERPL-MCNC: 41 MG/DL
HGB BLD-MCNC: 16.3 G/DL
IMM GRANULOCYTES NFR BLD AUTO: 2.8 %
LDLC SERPL CALC-MCNC: 84 MG/DL
LYMPHOCYTES # BLD AUTO: 0.97 K/UL
LYMPHOCYTES NFR BLD AUTO: 7.5 %
MAN DIFF?: NORMAL
MCHC RBC-ENTMCNC: 32.2 GM/DL
MCHC RBC-ENTMCNC: 33.7 PG
MCV RBC AUTO: 104.5 FL
MONOCYTES # BLD AUTO: 0.97 K/UL
MONOCYTES NFR BLD AUTO: 7.5 %
NEUTROPHILS # BLD AUTO: 10.17 K/UL
NEUTROPHILS NFR BLD AUTO: 78.9 %
NONHDLC SERPL-MCNC: 125 MG/DL
PLATELET # BLD AUTO: 434 K/UL
POTASSIUM SERPL-SCNC: 6 MMOL/L
PROT SERPL-MCNC: 6.3 G/DL
RBC # BLD: 4.84 M/UL
RBC # FLD: 14.1 %
SODIUM SERPL-SCNC: 143 MMOL/L
TRIGL SERPL-MCNC: 205 MG/DL
TSH SERPL-ACNC: 1.22 UIU/ML
URATE SERPL-MCNC: 3 MG/DL
VIT B12 SERPL-MCNC: 1182 PG/ML
WBC # FLD AUTO: 12.9 K/UL

## 2023-03-15 RX ORDER — ALLOPURINOL 300 MG/1
300 TABLET ORAL
Qty: 30 | Refills: 3 | Status: ACTIVE | COMMUNITY

## 2023-03-15 RX ORDER — BLOOD SUGAR DIAGNOSTIC
100 STRIP MISCELLANEOUS
Refills: 0 | Status: DISCONTINUED | COMMUNITY
End: 2023-03-15

## 2023-03-15 RX ORDER — ASPIRIN 81 MG
81 TABLET, DELAYED RELEASE (ENTERIC COATED) ORAL
Refills: 0 | Status: DISCONTINUED | COMMUNITY
End: 2023-03-15

## 2023-03-15 RX ORDER — SENNOSIDES 8.6 MG TABLETS 8.6 MG/1
8.6 TABLET ORAL
Qty: 60 | Refills: 1 | Status: ACTIVE | COMMUNITY
Start: 2023-03-15

## 2023-03-15 RX ORDER — EZETIMIBE 10 MG/1
10 TABLET ORAL
Refills: 0 | Status: DISCONTINUED | COMMUNITY
End: 2023-03-15

## 2023-03-15 RX ORDER — FINASTERIDE 5 MG/1
5 TABLET, FILM COATED ORAL DAILY
Qty: 90 | Refills: 3 | Status: ACTIVE | COMMUNITY

## 2023-03-15 RX ORDER — POLYETHYLENE GLYCOL 3350 17 G/17G
17 POWDER, FOR SOLUTION ORAL
Qty: 30 | Refills: 0 | Status: ACTIVE | COMMUNITY
Start: 2023-03-15

## 2023-03-15 RX ORDER — MONTELUKAST 10 MG/1
10 TABLET, FILM COATED ORAL
Qty: 30 | Refills: 0 | Status: ACTIVE | COMMUNITY

## 2023-03-15 RX ORDER — BIFIDOBACTERIUM LONGUM 10MM CELL
CAPSULE ORAL
Refills: 0 | Status: DISCONTINUED | COMMUNITY
End: 2023-03-15

## 2023-03-15 RX ORDER — PITAVASTATIN CALCIUM 4.18 MG/1
TABLET, FILM COATED ORAL
Refills: 0 | Status: DISCONTINUED | COMMUNITY
End: 2023-03-15

## 2023-03-15 RX ORDER — PRAMIPEXOLE DIHYDROCHLORIDE 0.25 MG/1
0.25 TABLET ORAL
Qty: 30 | Refills: 0 | Status: ACTIVE | COMMUNITY

## 2023-03-15 RX ORDER — PIOGLITAZONE 15 MG/1
15 TABLET ORAL
Refills: 0 | Status: DISCONTINUED | COMMUNITY
End: 2023-03-15

## 2023-03-15 RX ORDER — TAMSULOSIN HYDROCHLORIDE 0.4 MG/1
0.4 CAPSULE ORAL
Qty: 90 | Refills: 3 | Status: ACTIVE | COMMUNITY

## 2023-03-15 RX ORDER — AMLODIPINE BESYLATE 5 MG/1
5 TABLET ORAL DAILY
Qty: 90 | Refills: 3 | Status: ACTIVE | COMMUNITY
Start: 2023-03-15

## 2023-03-15 RX ORDER — CHLORDIAZEPOXIDE HYDROCHLORIDE AND CLIDINIUM BROMIDE 5; 2.5 MG/1; MG/1
CAPSULE ORAL
Refills: 0 | Status: DISCONTINUED | COMMUNITY
End: 2023-03-15

## 2023-03-15 NOTE — PHYSICAL EXAM
[Alert] : alert [No Acute Distress] : in no acute distress [Sclera] : the sclera and conjunctiva were normal [Normal Outer Ear/Nose] : the ears and nose were normal in appearance [Normal Appearance] : the appearance of the neck was normal [Auscultation Breath Sounds / Voice Sounds] : lungs were clear to auscultation bilaterally [Normal S1, S2] : normal S1 and S2 [Edema] : edema was not present [Normal] : normal affect and normal mood [de-identified] : Occasional skipped beats [de-identified] : Bladder not palpated, PVR 5 mL

## 2023-03-15 NOTE — REVIEW OF SYSTEMS
[As Noted in HPI] : as noted in HPI [Difficulty Walking] : difficulty walking [Negative] : Heme/Lymph [FreeTextEntry7] : Occasional constipation

## 2023-03-15 NOTE — ASSESSMENT
[FreeTextEntry1] : Advised follow-up with cardiology.  We will need repeat blood work in about 4 to 6 weeks.

## 2023-03-15 NOTE — HISTORY OF PRESENT ILLNESS
[Two or more falls in past year] : Patient reported two or more falls in the past year [0] : 2) Feeling down, depressed, or hopeless: Not at all (0) [PHQ-2 Negative - No further assessment needed] : PHQ-2 Negative - No further assessment needed [FreeTextEntry1] : Mr. Parham presents today for comprehensive geriatric assessment following a brief hospitalization.  The patient has a history of hypertension, asthma, diabetes and BPH.  He has longstanding gait instability.  He presented to [facility] on February 25 with multiple falls.  Of note he had had a prior hospitalization in recent months for similar complaint followed by a course of subacute rehab at the [facility] in [place].  On this occasion the patient was hemodynamically stable and afebrile but with leukocytosis and acute kidney injury with creatinine of 3.4.  Bladder scan revealed increased residual and a Suarez was placed, output was 1000 mL of urine.  Imaging of the CT head and C-spine were unremarkable.  It was felt that the patient was suffering from polypharmacy.  On admission the patient was on pioglitazone and Farxiga but A1c was only 5.5 and these were discontinued.  He was also on acebutolol which was also discontinued-patient states that he has a history of "skipped beats".  Patient was also on Librax for questionable IBS which was also discontinued.  Pitavastatin and Zetia were also discontinued.  Ramipril was held because of ANAM.  The patient was discharged home on March 2.  Fortunately the Suarez catheter was able to be removed and the patient is urinating without difficulty.  The patient presents to the office for short-term follow-up after his hospitalization but requests to transition his care to a doctor closer to where he lives as this is out of the way for him.\par The patient is currently receiving home-based physical therapy.\par Hospitalized February 25 through March 1.  Chart notes, discharge summary and studies reviewed on [facility] E[facility] [Completely Independent] : Completely independent. [[id number]] : 0

## 2023-03-16 LAB
ANION GAP SERPL CALC-SCNC: 12 MMOL/L
BUN SERPL-MCNC: 24 MG/DL
CALCIUM SERPL-MCNC: 9.1 MG/DL
CHLORIDE SERPL-SCNC: 106 MMOL/L
CO2 SERPL-SCNC: 22 MMOL/L
CREAT SERPL-MCNC: 1.85 MG/DL
EGFR: 35 ML/MIN/1.73M2
GLUCOSE SERPL-MCNC: 112 MG/DL
POTASSIUM SERPL-SCNC: 4.6 MMOL/L
SODIUM SERPL-SCNC: 141 MMOL/L

## 2023-05-15 ENCOUNTER — NON-APPOINTMENT (OUTPATIENT)
Age: 88
End: 2023-05-15

## 2023-05-19 NOTE — PROVIDER CONTACT NOTE (OTHER) - ASSESSMENT
Pt HR 58. All other VSS. Pt asymptomatic.
Pt having BM with bright red blood on toilet paper. Pt reports history of hemorrhoids. All VSS.
VSS, a&ox4, no c/o pain or discomfort, Pt OOB and ambulating
same name as above

## 2023-06-18 NOTE — PROVIDER CONTACT NOTE (CRITICAL VALUE NOTIFICATION) - NAME OF MD/NP/PA/DO NOTIFIED:
Santosh Kovacs MD Alert-The patient is alert, awake and responds to voice. The patient is oriented to time, place, and person. The triage nurse is able to obtain subjective information.

## 2023-09-22 NOTE — H&P ADULT - HISTORY OF PRESENT ILLNESS
[facility] Follow up Call  9/22/2023  3:42 PM     Notes: Keenan Tim emailed me back and said that she spoke with Mr. Lety Hooks and he is going to contact [address] [place] [address] Drive to see if he can get the Xarelto for $85.00. Terrence Urbina gave him the number to call. CHW instructed patient to call [facility] at [phone] for further assistance.     Electronically signed by Be Ho RN on 9/22/2023 at 3:42 PM George Ly about application. Next anticipated outreach with patient:     [x]  4 days(s)     []  week(s)   []  Month(s)    Patient in agreement with plan/refused further assistance.   [x] Agreed    [] Refused      Domingo Medina RN  Congestive Heart Failure [facility]   Office number: [phone] 83M with hx of HTN, DM, BHP, HLD, diverticulitis s/p LAR (2012, Dr. Alberts), incisional hernia s/p repair with mesh (2014, Dr. Hutchinson), presenting with 3 days of RUQ pain, nausea, and emesis. He has never had pain like this in the past. He has eaten very little since the pain began. Denies constipation, diarrhea, or shortness of breath.     In the ED he is febrile to 38.1, WBC count is 34.3, bilirubin 1.1, alk phos 141, AST//188, lactate 1.4, and US and CT show distended gallbladder with stone in the neck. 83M with hx of HTN, DM, BHP, HLD, diverticulitis s/p LAR (2012, Dr. Alberts), incisional hernia s/p repair with mesh (2014, Dr. Hutchinson), presenting with 3 days of RUQ pain, nausea, and emesis. He has never had pain like this in the past. He has eaten very little since the pain began. Denies constipation, diarrhea, or shortness of breath.     In the ED he is febrile to 38.1, WBC count is 34.3, bilirubin 1.1, alk phos 141, AST//188, lactate 1.4, and imaging shows cholecystitis with stones in the CBD.

## 2024-05-01 NOTE — ED PROVIDER NOTE - NS_BEDUNITTYPES_ED_ALL_ED
VIDEO VISIT PROGRESS NOTE  This visit was performed via live interactive two-way video.    During their visit, the patient was informed that their consent to treat includes permission to submit claims to their insurance on their behalf for the services received.  Clinician Location: [facility] in [place], [address]    Patient Location: Home    Chief Complaint  Sleep Problem and Video Visit    HISTORY OF PRESENT ILLNESS  Social Determinants  Trend Vitals        Moises Antunez is a pleasant 64 year old male who is requesting a Video Visit who had concerns including Sleep Problem and Video Visit.    In regards to patients COPD they report they are doing well. Deny any shortness of breath, cough, dyspnea on exertion or wheezing. They report not using any excessive rescue inhaler use.   In regards to patients insomnia they report doing well on current medications. They continue to work on sleep hygiene in addition to taking their medications. Report sleeping between 6-8 hours a night. Deny any daytime somnolence/fatigue.   Doing great with htn. Bp at home this am controlled. Denies any chest pain/orthopnea/paroxysmal nocturnal dyspnea/shortness of breath/dyspnea on exertion/palpitations/edema.      Social history:   Social History Narrative    (none)    Tobacco, alcohol and other:  reports that he quit smoking about 27 years ago. His smoking use included cigarettes. He has been exposed to tobacco smoke. He has never used smokeless tobacco. He reports that he does not currently use alcohol. He reports current drug use. Frequency: 7.00 times per week. Drug: Marijuana.    REVIEW OF SYSTEMS  All other pertinent systems are reviewed and are negative except as documented above.     HISTORIES  I have personally reviewed and updated the following electronic medical record sections: Past Medical History, Past Surgical History, Social History, Family History, Current medications,Allergies      MEDICATIONS  The  medication list in the medical record as well as updates to the medication list submitted by the patient with this V-Visit were reviewed and updated today.       PHYSICAL EXAM     Vital Signs Per Patient:        Constitutional: well-nourished, well-developed, well-appearing  Ears, nose, mouth, throat: normocephalic, atraumatic, external ears normal by inspection  Eyes: no proptosis, extra-ocular eye movement intact, normal sclerae, conjunctivae not injected  Neck: No visible goiter, range of motion of neck appears normal  Respiratory: No increased respiratory effort  Skin: no visible rash, no foot ulcers, no varicose veins  Psychiatric: non-anxious, normal affect      ASSESSMENT AND PLAN   Diego Antunez is a pleasant 64 year old male we discussed the followin. Insomnia, unspecified type  -     doxepin 3 MG tablet; Take 1 tablet by mouth nightly.  -     eszopiclone (LUNESTA) 1 MG Tab; Take 1 tablet by mouth at bedtime as needed (sleep).  2. Anxiety and depression  -     doxepin 3 MG tablet; Take 1 tablet by mouth nightly.  -     eszopiclone (LUNESTA) 1 MG Tab; Take 1 tablet by mouth at bedtime as needed (sleep).  3. Essential hypertension  4. Pulmonary emphysema, unspecified emphysema type (CMD)    Doing great now with insomnia/anxiety. Sleeping well finally.   Breathing is great on symbicort not using rescue inhaler  Bp good at home  Continue prescribed medications/current medical management.      Follow Up: No follow-ups on file.    Upcoming Appointments Already Scheduled  Future Appointments   Date Time Provider Department Center   7/10/2024 12:00 PM Hailee Johnson MD [facility]   2024  8:40 AM Rhys Fam MD [facility]   10/24/2024  8:00 AM Nancy Ochoa, [facility]     Treatment options discussed with patient and explained in detail. The risks, benefits and potential side effects of possible medications were reviewed. Alternatives were discussed. Medication  instructions and consequences of not taking the medications were discussed. Patient's understanding was assessed and patient agreed with the plan. Monitoring parameters and expected course outlined. Patient to call or come in if symptoms fail to respond as outlined or worsen in any way.    Rhys Fam MD Family Medicine       MEDICINE

## 2024-12-31 ENCOUNTER — NON-APPOINTMENT (OUTPATIENT)
Age: 89
End: 2024-12-31